# Patient Record
Sex: FEMALE | Employment: FULL TIME | URBAN - METROPOLITAN AREA
[De-identification: names, ages, dates, MRNs, and addresses within clinical notes are randomized per-mention and may not be internally consistent; named-entity substitution may affect disease eponyms.]

---

## 2020-05-12 ENCOUNTER — LAB ENCOUNTER (OUTPATIENT)
Dept: LAB | Age: 65
End: 2020-05-12
Attending: FAMILY MEDICINE
Payer: COMMERCIAL

## 2020-05-12 DIAGNOSIS — R53.83 FATIGUE: Primary | ICD-10-CM

## 2020-05-12 PROCEDURE — 81001 URINALYSIS AUTO W/SCOPE: CPT

## 2020-05-12 PROCEDURE — 84443 ASSAY THYROID STIM HORMONE: CPT

## 2020-05-12 PROCEDURE — 36415 COLL VENOUS BLD VENIPUNCTURE: CPT

## 2020-05-12 PROCEDURE — 80053 COMPREHEN METABOLIC PANEL: CPT

## 2020-05-12 PROCEDURE — 85025 COMPLETE CBC W/AUTO DIFF WBC: CPT

## 2020-05-12 PROCEDURE — 80061 LIPID PANEL: CPT

## 2021-08-16 ENCOUNTER — HOSPITAL ENCOUNTER (OUTPATIENT)
Dept: GENERAL RADIOLOGY | Facility: HOSPITAL | Age: 66
Discharge: HOME | End: 2021-08-16
Attending: FAMILY MEDICINE
Payer: COMMERCIAL

## 2021-08-16 ENCOUNTER — LAB ENCOUNTER (OUTPATIENT)
Dept: LAB | Facility: HOSPITAL | Age: 66
End: 2021-08-16
Attending: FAMILY MEDICINE
Payer: COMMERCIAL

## 2021-08-16 DIAGNOSIS — M25.562 LEFT KNEE PAIN: ICD-10-CM

## 2021-08-16 DIAGNOSIS — R53.83 FATIGUE: Primary | ICD-10-CM

## 2021-08-16 LAB
ALBUMIN SERPL-MCNC: 3.6 G/DL (ref 3.4–5)
ALBUMIN/GLOB SERPL: 0.7 {RATIO} (ref 1–2)
ALP LIVER SERPL-CCNC: 115 U/L
ALT SERPL-CCNC: 16 U/L
ANION GAP SERPL CALC-SCNC: 5 MMOL/L (ref 0–18)
AST SERPL-CCNC: 12 U/L (ref 15–37)
BASOPHILS # BLD AUTO: 0.04 X10(3) UL (ref 0–0.2)
BASOPHILS NFR BLD AUTO: 0.6 %
BILIRUB SERPL-MCNC: 0.3 MG/DL (ref 0.1–2)
BILIRUB UR QL: NEGATIVE
BUN BLD-MCNC: 13 MG/DL (ref 7–18)
BUN/CREAT SERPL: 19.7 (ref 10–20)
CALCIUM BLD-MCNC: 9.3 MG/DL (ref 8.5–10.1)
CHLORIDE SERPL-SCNC: 104 MMOL/L (ref 98–112)
CHOLEST SMN-MCNC: 191 MG/DL (ref ?–200)
CO2 SERPL-SCNC: 28 MMOL/L (ref 21–32)
COLOR UR: YELLOW
CREAT BLD-MCNC: 0.66 MG/DL
DEPRECATED RDW RBC AUTO: 41.6 FL (ref 35.1–46.3)
EOSINOPHIL # BLD AUTO: 0.22 X10(3) UL (ref 0–0.7)
EOSINOPHIL NFR BLD AUTO: 3.1 %
ERYTHROCYTE [DISTWIDTH] IN BLOOD BY AUTOMATED COUNT: 13.6 % (ref 11–15)
GLOBULIN PLAS-MCNC: 5.1 G/DL (ref 2.8–4.4)
GLUCOSE BLD-MCNC: 88 MG/DL (ref 70–99)
GLUCOSE UR-MCNC: NEGATIVE MG/DL
HCT VFR BLD AUTO: 37.5 %
HDLC SERPL-MCNC: 74 MG/DL (ref 40–59)
HGB BLD-MCNC: 11.6 G/DL
IMM GRANULOCYTES # BLD AUTO: 0.03 X10(3) UL (ref 0–1)
IMM GRANULOCYTES NFR BLD: 0.4 %
KETONES UR-MCNC: NEGATIVE MG/DL
LDLC SERPL CALC-MCNC: 103 MG/DL (ref ?–100)
LYMPHOCYTES # BLD AUTO: 2.02 X10(3) UL (ref 1–4)
LYMPHOCYTES NFR BLD AUTO: 28.1 %
M PROTEIN MFR SERPL ELPH: 8.7 G/DL (ref 6.4–8.2)
MCH RBC QN AUTO: 25.8 PG (ref 26–34)
MCHC RBC AUTO-ENTMCNC: 30.9 G/DL (ref 31–37)
MCV RBC AUTO: 83.3 FL
MONOCYTES # BLD AUTO: 0.52 X10(3) UL (ref 0.1–1)
MONOCYTES NFR BLD AUTO: 7.2 %
NEUTROPHILS # BLD AUTO: 4.36 X10 (3) UL (ref 1.5–7.7)
NEUTROPHILS # BLD AUTO: 4.36 X10(3) UL (ref 1.5–7.7)
NEUTROPHILS NFR BLD AUTO: 60.6 %
NITRITE UR QL STRIP.AUTO: NEGATIVE
NONHDLC SERPL-MCNC: 117 MG/DL (ref ?–130)
OSMOLALITY SERPL CALC.SUM OF ELEC: 284 MOSM/KG (ref 275–295)
PATIENT FASTING Y/N/NP: YES
PATIENT FASTING Y/N/NP: YES
PH UR: 5 [PH] (ref 5–8)
PLATELET # BLD AUTO: 273 10(3)UL (ref 150–450)
POTASSIUM SERPL-SCNC: 3.4 MMOL/L (ref 3.5–5.1)
PROT UR-MCNC: 30 MG/DL
RBC # BLD AUTO: 4.5 X10(6)UL
SODIUM SERPL-SCNC: 137 MMOL/L (ref 136–145)
SP GR UR STRIP: 1.02 (ref 1–1.03)
TRIGL SERPL-MCNC: 76 MG/DL (ref 30–149)
UROBILINOGEN UR STRIP-ACNC: <2
VLDLC SERPL CALC-MCNC: 13 MG/DL (ref 0–30)
WBC # BLD AUTO: 7.2 X10(3) UL (ref 4–11)

## 2021-08-16 PROCEDURE — 73562 X-RAY EXAM OF KNEE 3: CPT | Performed by: FAMILY MEDICINE

## 2021-08-16 PROCEDURE — 80061 LIPID PANEL: CPT

## 2021-08-16 PROCEDURE — 81001 URINALYSIS AUTO W/SCOPE: CPT

## 2021-08-16 PROCEDURE — 36415 COLL VENOUS BLD VENIPUNCTURE: CPT

## 2021-08-16 PROCEDURE — 80053 COMPREHEN METABOLIC PANEL: CPT

## 2021-08-16 PROCEDURE — 85025 COMPLETE CBC W/AUTO DIFF WBC: CPT

## 2023-07-14 ENCOUNTER — HOSPITAL ENCOUNTER (OUTPATIENT)
Dept: GENERAL RADIOLOGY | Facility: HOSPITAL | Age: 68
Discharge: HOME | End: 2023-07-14
Attending: FAMILY MEDICINE
Payer: COMMERCIAL

## 2023-07-14 DIAGNOSIS — R05.9 COUGH: ICD-10-CM

## 2023-07-14 PROCEDURE — 71046 X-RAY EXAM CHEST 2 VIEWS: CPT | Performed by: FAMILY MEDICINE

## 2024-01-01 ENCOUNTER — HOSPITAL ENCOUNTER (INPATIENT)
Facility: HOSPITAL | Age: 69
LOS: 1 days | End: 2024-01-01
Attending: INTERNAL MEDICINE | Admitting: INTERNAL MEDICINE
Payer: OTHER MISCELLANEOUS

## 2024-01-01 ENCOUNTER — APPOINTMENT (OUTPATIENT)
Dept: GENERAL RADIOLOGY | Facility: HOSPITAL | Age: 69
End: 2024-01-01
Attending: INTERNAL MEDICINE
Payer: MEDICARE

## 2024-01-01 ENCOUNTER — TELEPHONE (OUTPATIENT)
Dept: PULMONOLOGY | Facility: CLINIC | Age: 69
End: 2024-01-01

## 2024-01-01 ENCOUNTER — APPOINTMENT (OUTPATIENT)
Dept: PICC SERVICES | Facility: HOSPITAL | Age: 69
End: 2024-01-01
Attending: INTERNAL MEDICINE
Payer: MEDICARE

## 2024-01-01 ENCOUNTER — APPOINTMENT (OUTPATIENT)
Dept: CT IMAGING | Facility: HOSPITAL | Age: 69
End: 2024-01-01
Attending: EMERGENCY MEDICINE
Payer: MEDICARE

## 2024-01-01 ENCOUNTER — HOSPITAL ENCOUNTER (INPATIENT)
Facility: HOSPITAL | Age: 69
LOS: 12 days | Discharge: HOSPICE | End: 2024-01-01
Attending: EMERGENCY MEDICINE | Admitting: INTERNAL MEDICINE
Payer: MEDICARE

## 2024-01-01 ENCOUNTER — APPOINTMENT (OUTPATIENT)
Dept: CV DIAGNOSTICS | Facility: HOSPITAL | Age: 69
End: 2024-01-01
Attending: INTERNAL MEDICINE
Payer: MEDICARE

## 2024-01-01 ENCOUNTER — APPOINTMENT (OUTPATIENT)
Dept: ULTRASOUND IMAGING | Facility: HOSPITAL | Age: 69
End: 2024-01-01
Attending: INTERNAL MEDICINE
Payer: MEDICARE

## 2024-01-01 ENCOUNTER — HOSPITAL ENCOUNTER (INPATIENT)
Facility: HOSPITAL | Age: 69
LOS: 1 days | End: 2024-03-10
Attending: INTERNAL MEDICINE | Admitting: INTERNAL MEDICINE
Payer: OTHER MISCELLANEOUS

## 2024-01-01 ENCOUNTER — APPOINTMENT (OUTPATIENT)
Dept: GENERAL RADIOLOGY | Facility: HOSPITAL | Age: 69
End: 2024-01-01
Attending: EMERGENCY MEDICINE
Payer: MEDICARE

## 2024-01-01 VITALS
HEART RATE: 94 BPM | SYSTOLIC BLOOD PRESSURE: 89 MMHG | DIASTOLIC BLOOD PRESSURE: 40 MMHG | OXYGEN SATURATION: 76 % | RESPIRATION RATE: 21 BRPM

## 2024-01-01 VITALS
DIASTOLIC BLOOD PRESSURE: 47 MMHG | RESPIRATION RATE: 15 BRPM | HEART RATE: 89 BPM | SYSTOLIC BLOOD PRESSURE: 96 MMHG | WEIGHT: 280 LBS | BODY MASS INDEX: 44 KG/M2 | TEMPERATURE: 98 F | OXYGEN SATURATION: 74 %

## 2024-01-01 DIAGNOSIS — D86.9 SARCOIDOSIS: ICD-10-CM

## 2024-01-01 DIAGNOSIS — J96.21 ACUTE ON CHRONIC RESPIRATORY FAILURE WITH HYPOXIA (HCC): Primary | ICD-10-CM

## 2024-01-01 LAB
ADENOVIRUS PCR:: NOT DETECTED
ALBUMIN SERPL-MCNC: 3.6 G/DL (ref 3.2–4.8)
ALBUMIN SERPL-MCNC: 3.6 G/DL (ref 3.2–4.8)
ALBUMIN SERPL-MCNC: 3.7 G/DL (ref 3.2–4.8)
ALBUMIN SERPL-MCNC: 3.8 G/DL (ref 3.2–4.8)
ALBUMIN SERPL-MCNC: 3.9 G/DL (ref 3.2–4.8)
ALBUMIN SERPL-MCNC: 4 G/DL (ref 3.2–4.8)
ALBUMIN SERPL-MCNC: 4.1 G/DL (ref 3.2–4.8)
ALBUMIN SERPL-MCNC: 4.2 G/DL (ref 3.2–4.8)
ALBUMIN/GLOB SERPL: 0.9 {RATIO} (ref 1–2)
ALBUMIN/GLOB SERPL: 1 {RATIO} (ref 1–2)
ALBUMIN/GLOB SERPL: 1 {RATIO} (ref 1–2)
ALBUMIN/GLOB SERPL: 1.1 {RATIO} (ref 1–2)
ALP LIVER SERPL-CCNC: 104 U/L
ALP LIVER SERPL-CCNC: 106 U/L
ALP LIVER SERPL-CCNC: 109 U/L
ALP LIVER SERPL-CCNC: 132 U/L
ALP LIVER SERPL-CCNC: 139 U/L
ALP LIVER SERPL-CCNC: 154 U/L
ALT SERPL-CCNC: 120 U/L
ALT SERPL-CCNC: 14 U/L
ALT SERPL-CCNC: 16 U/L
ALT SERPL-CCNC: 17 U/L
ALT SERPL-CCNC: 172 U/L
ALT SERPL-CCNC: 74 U/L
ANA NUCLEOLAR TITR SER IF: 320 {TITER}
ANION GAP SERPL CALC-SCNC: 1 MMOL/L (ref 0–18)
ANION GAP SERPL CALC-SCNC: 10 MMOL/L (ref 0–18)
ANION GAP SERPL CALC-SCNC: 3 MMOL/L (ref 0–18)
ANION GAP SERPL CALC-SCNC: 4 MMOL/L (ref 0–18)
ANION GAP SERPL CALC-SCNC: 5 MMOL/L (ref 0–18)
ANION GAP SERPL CALC-SCNC: 6 MMOL/L (ref 0–18)
ANION GAP SERPL CALC-SCNC: 7 MMOL/L (ref 0–18)
ANION GAP SERPL CALC-SCNC: 9 MMOL/L (ref 0–18)
APTT PPP: 28.6 SECONDS (ref 23.3–35.6)
ASPERGILLUS FUMIGATUS IGG: 8.4 MG/L
AST SERPL-CCNC: 17 U/L (ref ?–34)
AST SERPL-CCNC: 19 U/L (ref ?–34)
AST SERPL-CCNC: 23 U/L (ref ?–34)
AST SERPL-CCNC: 37 U/L (ref ?–34)
AST SERPL-CCNC: 61 U/L (ref ?–34)
AST SERPL-CCNC: 69 U/L (ref ?–34)
ATRIAL RATE: 130 BPM
B PARAPERT DNA SPEC QL NAA+PROBE: NOT DETECTED
B PERT DNA SPEC QL NAA+PROBE: NOT DETECTED
B2 GLYCOPROT1 IGG SERPL IA-ACNC: 1.3 U/ML (ref ?–7)
B2 GLYCOPROT1 IGM SERPL IA-ACNC: 2.5 U/ML (ref ?–7)
BASOPHILS # BLD AUTO: 0.01 X10(3) UL (ref 0–0.2)
BASOPHILS # BLD AUTO: 0.02 X10(3) UL (ref 0–0.2)
BASOPHILS # BLD AUTO: 0.03 X10(3) UL (ref 0–0.2)
BASOPHILS # BLD AUTO: 0.03 X10(3) UL (ref 0–0.2)
BASOPHILS # BLD AUTO: 0.04 X10(3) UL (ref 0–0.2)
BASOPHILS NFR BLD AUTO: 0.1 %
BASOPHILS NFR BLD AUTO: 0.2 %
BASOPHILS NFR BLD AUTO: 0.3 %
BASOPHILS NFR BLD AUTO: 0.3 %
BILIRUB SERPL-MCNC: 0.2 MG/DL (ref 0.2–1.1)
BILIRUB SERPL-MCNC: 0.3 MG/DL (ref 0.2–1.1)
BILIRUB SERPL-MCNC: 0.4 MG/DL (ref 0.2–1.1)
BILIRUB SERPL-MCNC: 0.5 MG/DL (ref 0.2–1.1)
BLASTOMYCES AG INTERP: NEGATIVE
BLASTOMYCES AG INTERP: NEGATIVE
BNP SERPL-MCNC: 18 PG/ML
BUN BLD-MCNC: 16 MG/DL (ref 9–23)
BUN BLD-MCNC: 22 MG/DL (ref 9–23)
BUN BLD-MCNC: 30 MG/DL (ref 9–23)
BUN BLD-MCNC: 31 MG/DL (ref 9–23)
BUN BLD-MCNC: 33 MG/DL (ref 9–23)
BUN BLD-MCNC: 34 MG/DL (ref 9–23)
BUN BLD-MCNC: 35 MG/DL (ref 9–23)
BUN BLD-MCNC: 37 MG/DL (ref 9–23)
BUN BLD-MCNC: 37 MG/DL (ref 9–23)
BUN BLD-MCNC: 42 MG/DL (ref 9–23)
BUN BLD-MCNC: 43 MG/DL (ref 9–23)
BUN BLD-MCNC: 48 MG/DL (ref 9–23)
BUN BLD-MCNC: 70 MG/DL (ref 9–23)
BUN/CREAT SERPL: 18.8 (ref 10–20)
BUN/CREAT SERPL: 25.9 (ref 10–20)
BUN/CREAT SERPL: 33.3 (ref 10–20)
BUN/CREAT SERPL: 34.3 (ref 10–20)
BUN/CREAT SERPL: 34.4 (ref 10–20)
BUN/CREAT SERPL: 36.1 (ref 10–20)
BUN/CREAT SERPL: 36.2 (ref 10–20)
BUN/CREAT SERPL: 36.5 (ref 10–20)
BUN/CREAT SERPL: 38.5 (ref 10–20)
BUN/CREAT SERPL: 39.3 (ref 10–20)
BUN/CREAT SERPL: 39.8 (ref 10–20)
BUN/CREAT SERPL: 41.6 (ref 10–20)
BUN/CREAT SERPL: 44.8 (ref 10–20)
C PNEUM DNA SPEC QL NAA+PROBE: NOT DETECTED
C3 SERPL-MCNC: 179.6 MG/DL (ref 90–170)
C4 SERPL-MCNC: 40.3 MG/DL (ref 12–36)
CALCIUM BLD-MCNC: 8.7 MG/DL (ref 8.7–10.4)
CALCIUM BLD-MCNC: 8.8 MG/DL (ref 8.7–10.4)
CALCIUM BLD-MCNC: 8.9 MG/DL (ref 8.7–10.4)
CALCIUM BLD-MCNC: 8.9 MG/DL (ref 8.7–10.4)
CALCIUM BLD-MCNC: 9 MG/DL (ref 8.7–10.4)
CALCIUM BLD-MCNC: 9.1 MG/DL (ref 8.7–10.4)
CALCIUM BLD-MCNC: 9.2 MG/DL (ref 8.7–10.4)
CALCIUM BLD-MCNC: 9.2 MG/DL (ref 8.7–10.4)
CALCIUM BLD-MCNC: 9.3 MG/DL (ref 8.7–10.4)
CARDIOLIPIN IGG SERPL-ACNC: 3.3 GPL (ref ?–10)
CARDIOLIPIN IGM SERPL-ACNC: 8.9 MPL (ref ?–10)
CCP IGG SERPL-ACNC: 1.4 U/ML (ref 0–6.9)
CENTROMERE IGG SER-ACNC: 131 U/ML
CHLORIDE SERPL-SCNC: 102 MMOL/L (ref 98–112)
CHLORIDE SERPL-SCNC: 103 MMOL/L (ref 98–112)
CHLORIDE SERPL-SCNC: 103 MMOL/L (ref 98–112)
CHLORIDE SERPL-SCNC: 104 MMOL/L (ref 98–112)
CHLORIDE SERPL-SCNC: 105 MMOL/L (ref 98–112)
CHLORIDE SERPL-SCNC: 106 MMOL/L (ref 98–112)
CHLORIDE SERPL-SCNC: 109 MMOL/L (ref 98–112)
CHLORIDE SERPL-SCNC: 109 MMOL/L (ref 98–112)
CHLORIDE SERPL-SCNC: 110 MMOL/L (ref 98–112)
CO2 SERPL-SCNC: 22 MMOL/L (ref 21–32)
CO2 SERPL-SCNC: 23 MMOL/L (ref 21–32)
CO2 SERPL-SCNC: 26 MMOL/L (ref 21–32)
CO2 SERPL-SCNC: 27 MMOL/L (ref 21–32)
CO2 SERPL-SCNC: 28 MMOL/L (ref 21–32)
CO2 SERPL-SCNC: 28 MMOL/L (ref 21–32)
CO2 SERPL-SCNC: 29 MMOL/L (ref 21–32)
CO2 SERPL-SCNC: 31 MMOL/L (ref 21–32)
CO2 SERPL-SCNC: 32 MMOL/L (ref 21–32)
CONFIRM APTT STACLOT: POSITIVE
CONFIRM DRVVT: 1.3 S (ref 0–1.1)
CONFIRM DRVVT: 1.5 S (ref 0–1.1)
CORONAVIRUS 229E PCR:: NOT DETECTED
CORONAVIRUS HKU1 PCR:: NOT DETECTED
CORONAVIRUS NL63 PCR:: NOT DETECTED
CORONAVIRUS OC43 PCR:: NOT DETECTED
CREAT BLD-MCNC: 0.83 MG/DL
CREAT BLD-MCNC: 0.85 MG/DL
CREAT BLD-MCNC: 0.89 MG/DL
CREAT BLD-MCNC: 0.91 MG/DL
CREAT BLD-MCNC: 0.96 MG/DL
CREAT BLD-MCNC: 0.96 MG/DL
CREAT BLD-MCNC: 1.02 MG/DL
CREAT BLD-MCNC: 1.08 MG/DL
CREAT BLD-MCNC: 1.16 MG/DL
CREAT BLD-MCNC: 1.22 MG/DL
CREAT BLD-MCNC: 1.76 MG/DL
D DIMER PPP FEU-MCNC: 1.42 UG/ML FEU (ref ?–0.67)
DEPRECATED RDW RBC AUTO: 44.5 FL (ref 35.1–46.3)
DEPRECATED RDW RBC AUTO: 45 FL (ref 35.1–46.3)
DEPRECATED RDW RBC AUTO: 45.4 FL (ref 35.1–46.3)
DEPRECATED RDW RBC AUTO: 45.5 FL (ref 35.1–46.3)
DEPRECATED RDW RBC AUTO: 46.1 FL (ref 35.1–46.3)
DEPRECATED RDW RBC AUTO: 46.5 FL (ref 35.1–46.3)
DEPRECATED RDW RBC AUTO: 47 FL (ref 35.1–46.3)
DEPRECATED RDW RBC AUTO: 47.4 FL (ref 35.1–46.3)
DEPRECATED RDW RBC AUTO: 47.7 FL (ref 35.1–46.3)
DEPRECATED RDW RBC AUTO: 48.9 FL (ref 35.1–46.3)
DEPRECATED RDW RBC AUTO: 50.3 FL (ref 35.1–46.3)
DSDNA IGG SERPL IA-ACNC: 1.1 IU/ML
DSDNA IGG SERPL IA-ACNC: 1.1 IU/ML
EGFRCR SERPLBLD CKD-EPI 2021: 31 ML/MIN/1.73M2 (ref 60–?)
EGFRCR SERPLBLD CKD-EPI 2021: 49 ML/MIN/1.73M2 (ref 60–?)
EGFRCR SERPLBLD CKD-EPI 2021: 52 ML/MIN/1.73M2 (ref 60–?)
EGFRCR SERPLBLD CKD-EPI 2021: 56 ML/MIN/1.73M2 (ref 60–?)
EGFRCR SERPLBLD CKD-EPI 2021: 60 ML/MIN/1.73M2 (ref 60–?)
EGFRCR SERPLBLD CKD-EPI 2021: 65 ML/MIN/1.73M2 (ref 60–?)
EGFRCR SERPLBLD CKD-EPI 2021: 65 ML/MIN/1.73M2 (ref 60–?)
EGFRCR SERPLBLD CKD-EPI 2021: 69 ML/MIN/1.73M2 (ref 60–?)
EGFRCR SERPLBLD CKD-EPI 2021: 71 ML/MIN/1.73M2 (ref 60–?)
EGFRCR SERPLBLD CKD-EPI 2021: 75 ML/MIN/1.73M2 (ref 60–?)
EGFRCR SERPLBLD CKD-EPI 2021: 77 ML/MIN/1.73M2 (ref 60–?)
ENA AB SER QL IA: 22 UG/L
ENA AB SER QL IA: POSITIVE
ENA JO1 AB SER IA-ACNC: 0.4 U/ML
ENA RNP IGG SER IA-ACNC: 2.1 U/ML
ENA SCL70 IGG SER IA-ACNC: 1 U/ML
ENA SM IGG SER IA-ACNC: 1.1 U/ML
ENA SS-A IGG SER IA-ACNC: 105 U/ML
ENA SS-B IGG SER IA-ACNC: 0.7 U/ML
EOSINOPHIL # BLD AUTO: 0 X10(3) UL (ref 0–0.7)
EOSINOPHIL # BLD AUTO: 0.14 X10(3) UL (ref 0–0.7)
EOSINOPHIL NFR BLD AUTO: 0 %
EOSINOPHIL NFR BLD AUTO: 0.9 %
ERYTHROCYTE [DISTWIDTH] IN BLOOD BY AUTOMATED COUNT: 15.9 % (ref 11–15)
ERYTHROCYTE [DISTWIDTH] IN BLOOD BY AUTOMATED COUNT: 16.1 % (ref 11–15)
ERYTHROCYTE [DISTWIDTH] IN BLOOD BY AUTOMATED COUNT: 16.1 % (ref 11–15)
ERYTHROCYTE [DISTWIDTH] IN BLOOD BY AUTOMATED COUNT: 16.3 % (ref 11–15)
ERYTHROCYTE [DISTWIDTH] IN BLOOD BY AUTOMATED COUNT: 16.4 % (ref 11–15)
ERYTHROCYTE [DISTWIDTH] IN BLOOD BY AUTOMATED COUNT: 16.6 % (ref 11–15)
ERYTHROCYTE [DISTWIDTH] IN BLOOD BY AUTOMATED COUNT: 16.7 % (ref 11–15)
ERYTHROCYTE [SEDIMENTATION RATE] IN BLOOD: >130 MM/HR
FLUAV + FLUBV RNA SPEC NAA+PROBE: NEGATIVE
FLUAV + FLUBV RNA SPEC NAA+PROBE: NEGATIVE
FLUAV RNA SPEC QL NAA+PROBE: NOT DETECTED
FLUBV RNA SPEC QL NAA+PROBE: NOT DETECTED
GLOBULIN PLAS-MCNC: 3.5 G/DL (ref 2.8–4.4)
GLOBULIN PLAS-MCNC: 3.7 G/DL (ref 2.8–4.4)
GLOBULIN PLAS-MCNC: 3.8 G/DL (ref 2.8–4.4)
GLOBULIN PLAS-MCNC: 3.8 G/DL (ref 2.8–4.4)
GLOBULIN PLAS-MCNC: 4 G/DL (ref 2.8–4.4)
GLOBULIN PLAS-MCNC: 4 G/DL (ref 2.8–4.4)
GLUCOSE BLD-MCNC: 102 MG/DL (ref 70–99)
GLUCOSE BLD-MCNC: 104 MG/DL (ref 70–99)
GLUCOSE BLD-MCNC: 106 MG/DL (ref 70–99)
GLUCOSE BLD-MCNC: 106 MG/DL (ref 70–99)
GLUCOSE BLD-MCNC: 111 MG/DL (ref 70–99)
GLUCOSE BLD-MCNC: 112 MG/DL (ref 70–99)
GLUCOSE BLD-MCNC: 113 MG/DL (ref 70–99)
GLUCOSE BLD-MCNC: 114 MG/DL (ref 70–99)
GLUCOSE BLD-MCNC: 114 MG/DL (ref 70–99)
GLUCOSE BLD-MCNC: 115 MG/DL (ref 70–99)
GLUCOSE BLD-MCNC: 121 MG/DL (ref 70–99)
GLUCOSE BLD-MCNC: 132 MG/DL (ref 70–99)
GLUCOSE BLD-MCNC: 138 MG/DL (ref 70–99)
HAV IGM SER QL: NONREACTIVE
HBV CORE IGM SER QL: NONREACTIVE
HBV SURFACE AG SERPL QL IA: NONREACTIVE
HCT VFR BLD AUTO: 35.2 %
HCT VFR BLD AUTO: 36.7 %
HCT VFR BLD AUTO: 37.9 %
HCT VFR BLD AUTO: 38.2 %
HCT VFR BLD AUTO: 38.5 %
HCT VFR BLD AUTO: 39.2 %
HCT VFR BLD AUTO: 39.8 %
HCT VFR BLD AUTO: 40.1 %
HCT VFR BLD AUTO: 40.6 %
HCT VFR BLD AUTO: 41.1 %
HCT VFR BLD AUTO: 41.3 %
HCV AB SERPL QL IA: NONREACTIVE
HGB BLD-MCNC: 11.2 G/DL
HGB BLD-MCNC: 11.3 G/DL
HGB BLD-MCNC: 11.4 G/DL
HGB BLD-MCNC: 11.5 G/DL
HGB BLD-MCNC: 11.5 G/DL
HGB BLD-MCNC: 11.9 G/DL
HGB BLD-MCNC: 12.1 G/DL
HGB BLD-MCNC: 12.1 G/DL
HGB BLD-MCNC: 12.2 G/DL
HGB BLD-MCNC: 12.3 G/DL
HGB BLD-MCNC: 12.3 G/DL
HGB BLD-MCNC: 12.4 G/DL
HGB BLD-MCNC: 12.6 G/DL
IMM GRANULOCYTES # BLD AUTO: 0.04 X10(3) UL (ref 0–1)
IMM GRANULOCYTES # BLD AUTO: 0.05 X10(3) UL (ref 0–1)
IMM GRANULOCYTES # BLD AUTO: 0.05 X10(3) UL (ref 0–1)
IMM GRANULOCYTES # BLD AUTO: 0.06 X10(3) UL (ref 0–1)
IMM GRANULOCYTES # BLD AUTO: 0.07 X10(3) UL (ref 0–1)
IMM GRANULOCYTES # BLD AUTO: 0.07 X10(3) UL (ref 0–1)
IMM GRANULOCYTES # BLD AUTO: 0.09 X10(3) UL (ref 0–1)
IMM GRANULOCYTES # BLD AUTO: 0.11 X10(3) UL (ref 0–1)
IMM GRANULOCYTES # BLD AUTO: 0.12 X10(3) UL (ref 0–1)
IMM GRANULOCYTES # BLD AUTO: 0.12 X10(3) UL (ref 0–1)
IMM GRANULOCYTES # BLD AUTO: 0.16 X10(3) UL (ref 0–1)
IMM GRANULOCYTES # BLD AUTO: 0.2 X10(3) UL (ref 0–1)
IMM GRANULOCYTES # BLD AUTO: 0.26 X10(3) UL (ref 0–1)
IMM GRANULOCYTES NFR BLD: 0.4 %
IMM GRANULOCYTES NFR BLD: 0.5 %
IMM GRANULOCYTES NFR BLD: 0.6 %
IMM GRANULOCYTES NFR BLD: 0.7 %
IMM GRANULOCYTES NFR BLD: 0.7 %
IMM GRANULOCYTES NFR BLD: 0.8 %
IMM GRANULOCYTES NFR BLD: 1.2 %
IMM GRANULOCYTES NFR BLD: 1.5 %
IMM GRANULOCYTES NFR BLD: 1.7 %
LDH SERPL L TO P-CCNC: 438 U/L
LYMPHOCYTES # BLD AUTO: 0.21 X10(3) UL (ref 1–4)
LYMPHOCYTES # BLD AUTO: 0.23 X10(3) UL (ref 1–4)
LYMPHOCYTES # BLD AUTO: 0.24 X10(3) UL (ref 1–4)
LYMPHOCYTES # BLD AUTO: 0.38 X10(3) UL (ref 1–4)
LYMPHOCYTES # BLD AUTO: 0.38 X10(3) UL (ref 1–4)
LYMPHOCYTES # BLD AUTO: 0.42 X10(3) UL (ref 1–4)
LYMPHOCYTES # BLD AUTO: 0.43 X10(3) UL (ref 1–4)
LYMPHOCYTES # BLD AUTO: 0.47 X10(3) UL (ref 1–4)
LYMPHOCYTES # BLD AUTO: 0.59 X10(3) UL (ref 1–4)
LYMPHOCYTES # BLD AUTO: 0.82 X10(3) UL (ref 1–4)
LYMPHOCYTES # BLD AUTO: 1.53 X10(3) UL (ref 1–4)
LYMPHOCYTES # BLD AUTO: 1.6 X10(3) UL (ref 1–4)
LYMPHOCYTES # BLD AUTO: 3.1 X10(3) UL (ref 1–4)
LYMPHOCYTES NFR BLD AUTO: 1.5 %
LYMPHOCYTES NFR BLD AUTO: 1.6 %
LYMPHOCYTES NFR BLD AUTO: 1.7 %
LYMPHOCYTES NFR BLD AUTO: 10.5 %
LYMPHOCYTES NFR BLD AUTO: 20 %
LYMPHOCYTES NFR BLD AUTO: 3.2 %
LYMPHOCYTES NFR BLD AUTO: 3.4 %
LYMPHOCYTES NFR BLD AUTO: 4.2 %
LYMPHOCYTES NFR BLD AUTO: 4.2 %
LYMPHOCYTES NFR BLD AUTO: 4.4 %
LYMPHOCYTES NFR BLD AUTO: 4.9 %
LYMPHOCYTES NFR BLD AUTO: 5.2 %
LYMPHOCYTES NFR BLD AUTO: 8.1 %
M TB IFN-G CD4+ T-CELLS BLD-ACNC: 0 IU/ML
M TB TUBERC IFN-G BLD QL: NEGATIVE
M TB TUBERC IGNF/MITOGEN IGNF CONTROL: 0.68 IU/ML
MAGNESIUM SERPL-MCNC: 2.2 MG/DL (ref 1.6–2.6)
MAGNESIUM SERPL-MCNC: 2.4 MG/DL (ref 1.6–2.6)
MAGNESIUM SERPL-MCNC: 2.6 MG/DL (ref 1.6–2.6)
MCH RBC QN AUTO: 23.8 PG (ref 26–34)
MCH RBC QN AUTO: 23.9 PG (ref 26–34)
MCH RBC QN AUTO: 24 PG (ref 26–34)
MCH RBC QN AUTO: 24.1 PG (ref 26–34)
MCH RBC QN AUTO: 24.2 PG (ref 26–34)
MCH RBC QN AUTO: 24.2 PG (ref 26–34)
MCH RBC QN AUTO: 24.5 PG (ref 26–34)
MCH RBC QN AUTO: 24.6 PG (ref 26–34)
MCH RBC QN AUTO: 25.1 PG (ref 26–34)
MCH RBC QN AUTO: 25.2 PG (ref 26–34)
MCH RBC QN AUTO: 25.2 PG (ref 26–34)
MCH RBC QN AUTO: 25.4 PG (ref 26–34)
MCH RBC QN AUTO: 25.6 PG (ref 26–34)
MCHC RBC AUTO-ENTMCNC: 29.3 G/DL (ref 31–37)
MCHC RBC AUTO-ENTMCNC: 29.9 G/DL (ref 31–37)
MCHC RBC AUTO-ENTMCNC: 30.1 G/DL (ref 31–37)
MCHC RBC AUTO-ENTMCNC: 30.1 G/DL (ref 31–37)
MCHC RBC AUTO-ENTMCNC: 30.5 G/DL (ref 31–37)
MCHC RBC AUTO-ENTMCNC: 30.9 G/DL (ref 31–37)
MCHC RBC AUTO-ENTMCNC: 30.9 G/DL (ref 31–37)
MCHC RBC AUTO-ENTMCNC: 31 G/DL (ref 31–37)
MCHC RBC AUTO-ENTMCNC: 31.9 G/DL (ref 31–37)
MCHC RBC AUTO-ENTMCNC: 32.1 G/DL (ref 31–37)
MCHC RBC AUTO-ENTMCNC: 32.2 G/DL (ref 31–37)
MCV RBC AUTO: 78.4 FL
MCV RBC AUTO: 78.4 FL
MCV RBC AUTO: 78.8 FL
MCV RBC AUTO: 79 FL
MCV RBC AUTO: 79.6 FL
MCV RBC AUTO: 79.8 FL
MCV RBC AUTO: 80.3 FL
MCV RBC AUTO: 80.4 FL
MCV RBC AUTO: 80.5 FL
MCV RBC AUTO: 80.9 FL
MCV RBC AUTO: 80.9 FL
MCV RBC AUTO: 82 FL
MCV RBC AUTO: 83.6 FL
METAPNEUMOVIRUS PCR:: NOT DETECTED
MONOCYTES # BLD AUTO: 0.23 X10(3) UL (ref 0.1–1)
MONOCYTES # BLD AUTO: 0.24 X10(3) UL (ref 0.1–1)
MONOCYTES # BLD AUTO: 0.3 X10(3) UL (ref 0.1–1)
MONOCYTES # BLD AUTO: 0.31 X10(3) UL (ref 0.1–1)
MONOCYTES # BLD AUTO: 0.31 X10(3) UL (ref 0.1–1)
MONOCYTES # BLD AUTO: 0.33 X10(3) UL (ref 0.1–1)
MONOCYTES # BLD AUTO: 0.37 X10(3) UL (ref 0.1–1)
MONOCYTES # BLD AUTO: 0.52 X10(3) UL (ref 0.1–1)
MONOCYTES # BLD AUTO: 0.52 X10(3) UL (ref 0.1–1)
MONOCYTES # BLD AUTO: 0.7 X10(3) UL (ref 0.1–1)
MONOCYTES # BLD AUTO: 0.79 X10(3) UL (ref 0.1–1)
MONOCYTES # BLD AUTO: 0.81 X10(3) UL (ref 0.1–1)
MONOCYTES # BLD AUTO: 0.91 X10(3) UL (ref 0.1–1)
MONOCYTES NFR BLD AUTO: 1.5 %
MONOCYTES NFR BLD AUTO: 2.2 %
MONOCYTES NFR BLD AUTO: 2.6 %
MONOCYTES NFR BLD AUTO: 2.7 %
MONOCYTES NFR BLD AUTO: 2.8 %
MONOCYTES NFR BLD AUTO: 3.2 %
MONOCYTES NFR BLD AUTO: 3.3 %
MONOCYTES NFR BLD AUTO: 3.7 %
MONOCYTES NFR BLD AUTO: 3.7 %
MONOCYTES NFR BLD AUTO: 3.8 %
MONOCYTES NFR BLD AUTO: 5.2 %
MONOCYTES NFR BLD AUTO: 5.8 %
MONOCYTES NFR BLD AUTO: 6 %
MYCOPLASMA PNEUMONIA PCR:: NOT DETECTED
NEUTROPHILS # BLD AUTO: 10.29 X10 (3) UL (ref 1.5–7.7)
NEUTROPHILS # BLD AUTO: 10.29 X10(3) UL (ref 1.5–7.7)
NEUTROPHILS # BLD AUTO: 10.44 X10 (3) UL (ref 1.5–7.7)
NEUTROPHILS # BLD AUTO: 10.44 X10(3) UL (ref 1.5–7.7)
NEUTROPHILS # BLD AUTO: 11.13 X10 (3) UL (ref 1.5–7.7)
NEUTROPHILS # BLD AUTO: 11.13 X10(3) UL (ref 1.5–7.7)
NEUTROPHILS # BLD AUTO: 11.2 X10 (3) UL (ref 1.5–7.7)
NEUTROPHILS # BLD AUTO: 11.2 X10(3) UL (ref 1.5–7.7)
NEUTROPHILS # BLD AUTO: 11.31 X10 (3) UL (ref 1.5–7.7)
NEUTROPHILS # BLD AUTO: 11.31 X10(3) UL (ref 1.5–7.7)
NEUTROPHILS # BLD AUTO: 12.34 X10 (3) UL (ref 1.5–7.7)
NEUTROPHILS # BLD AUTO: 12.34 X10(3) UL (ref 1.5–7.7)
NEUTROPHILS # BLD AUTO: 12.86 X10 (3) UL (ref 1.5–7.7)
NEUTROPHILS # BLD AUTO: 12.86 X10(3) UL (ref 1.5–7.7)
NEUTROPHILS # BLD AUTO: 13.25 X10 (3) UL (ref 1.5–7.7)
NEUTROPHILS # BLD AUTO: 13.25 X10(3) UL (ref 1.5–7.7)
NEUTROPHILS # BLD AUTO: 13.61 X10 (3) UL (ref 1.5–7.7)
NEUTROPHILS # BLD AUTO: 13.61 X10(3) UL (ref 1.5–7.7)
NEUTROPHILS # BLD AUTO: 14.46 X10 (3) UL (ref 1.5–7.7)
NEUTROPHILS # BLD AUTO: 14.46 X10(3) UL (ref 1.5–7.7)
NEUTROPHILS # BLD AUTO: 16.41 X10 (3) UL (ref 1.5–7.7)
NEUTROPHILS # BLD AUTO: 16.41 X10(3) UL (ref 1.5–7.7)
NEUTROPHILS # BLD AUTO: 8.95 X10 (3) UL (ref 1.5–7.7)
NEUTROPHILS # BLD AUTO: 8.95 X10(3) UL (ref 1.5–7.7)
NEUTROPHILS # BLD AUTO: 9.01 X10 (3) UL (ref 1.5–7.7)
NEUTROPHILS # BLD AUTO: 9.01 X10(3) UL (ref 1.5–7.7)
NEUTROPHILS NFR BLD AUTO: 72.8 %
NEUTROPHILS NFR BLD AUTO: 87.2 %
NEUTROPHILS NFR BLD AUTO: 87.5 %
NEUTROPHILS NFR BLD AUTO: 90.5 %
NEUTROPHILS NFR BLD AUTO: 90.8 %
NEUTROPHILS NFR BLD AUTO: 90.9 %
NEUTROPHILS NFR BLD AUTO: 91.1 %
NEUTROPHILS NFR BLD AUTO: 91.9 %
NEUTROPHILS NFR BLD AUTO: 92 %
NEUTROPHILS NFR BLD AUTO: 93 %
NEUTROPHILS NFR BLD AUTO: 93.2 %
NEUTROPHILS NFR BLD AUTO: 93.4 %
NEUTROPHILS NFR BLD AUTO: 93.5 %
NUCLEAR IGG TITR SER IF: POSITIVE {TITER}
OSMOLALITY SERPL CALC.SUM OF ELEC: 288 MOSM/KG (ref 275–295)
OSMOLALITY SERPL CALC.SUM OF ELEC: 293 MOSM/KG (ref 275–295)
OSMOLALITY SERPL CALC.SUM OF ELEC: 294 MOSM/KG (ref 275–295)
OSMOLALITY SERPL CALC.SUM OF ELEC: 295 MOSM/KG (ref 275–295)
OSMOLALITY SERPL CALC.SUM OF ELEC: 296 MOSM/KG (ref 275–295)
OSMOLALITY SERPL CALC.SUM OF ELEC: 297 MOSM/KG (ref 275–295)
OSMOLALITY SERPL CALC.SUM OF ELEC: 299 MOSM/KG (ref 275–295)
OSMOLALITY SERPL CALC.SUM OF ELEC: 300 MOSM/KG (ref 275–295)
OSMOLALITY SERPL CALC.SUM OF ELEC: 301 MOSM/KG (ref 275–295)
OSMOLALITY SERPL CALC.SUM OF ELEC: 303 MOSM/KG (ref 275–295)
OSMOLALITY SERPL CALC.SUM OF ELEC: 309 MOSM/KG (ref 275–295)
P AXIS: 50 DEGREES
P-R INTERVAL: 134 MS
PARAINFLUENZA 1 PCR:: NOT DETECTED
PARAINFLUENZA 2 PCR:: NOT DETECTED
PARAINFLUENZA 3 PCR:: NOT DETECTED
PARAINFLUENZA 4 PCR:: NOT DETECTED
PHOSPHATE SERPL-MCNC: 4 MG/DL (ref 2.4–5.1)
PHOSPHATE SERPL-MCNC: 4 MG/DL (ref 2.4–5.1)
PHOSPHATE SERPL-MCNC: 4.2 MG/DL (ref 2.4–5.1)
PHOSPHATE SERPL-MCNC: 4.3 MG/DL (ref 2.4–5.1)
PHOSPHATE SERPL-MCNC: 4.5 MG/DL (ref 2.4–5.1)
PHOSPHATE SERPL-MCNC: 4.7 MG/DL (ref 2.4–5.1)
PHOSPHATE SERPL-MCNC: 4.7 MG/DL (ref 2.4–5.1)
PLATELET # BLD AUTO: 235 10(3)UL (ref 150–450)
PLATELET # BLD AUTO: 235 10(3)UL (ref 150–450)
PLATELET # BLD AUTO: 252 10(3)UL (ref 150–450)
PLATELET # BLD AUTO: 254 10(3)UL (ref 150–450)
PLATELET # BLD AUTO: 255 10(3)UL (ref 150–450)
PLATELET # BLD AUTO: 262 10(3)UL (ref 150–450)
PLATELET # BLD AUTO: 269 10(3)UL (ref 150–450)
PLATELET # BLD AUTO: 269 10(3)UL (ref 150–450)
PLATELET # BLD AUTO: 271 10(3)UL (ref 150–450)
PLATELET # BLD AUTO: 275 10(3)UL (ref 150–450)
PLATELET # BLD AUTO: 295 10(3)UL (ref 150–450)
PLATELET # BLD AUTO: 298 10(3)UL (ref 150–450)
PLATELET # BLD AUTO: 302 10(3)UL (ref 150–450)
POTASSIUM SERPL-SCNC: 3.5 MMOL/L (ref 3.5–5.1)
POTASSIUM SERPL-SCNC: 4 MMOL/L (ref 3.5–5.1)
POTASSIUM SERPL-SCNC: 4.1 MMOL/L (ref 3.5–5.1)
POTASSIUM SERPL-SCNC: 4.4 MMOL/L (ref 3.5–5.1)
POTASSIUM SERPL-SCNC: 4.6 MMOL/L (ref 3.5–5.1)
POTASSIUM SERPL-SCNC: 4.6 MMOL/L (ref 3.5–5.1)
POTASSIUM SERPL-SCNC: 4.8 MMOL/L (ref 3.5–5.1)
POTASSIUM SERPL-SCNC: 4.8 MMOL/L (ref 3.5–5.1)
POTASSIUM SERPL-SCNC: 4.9 MMOL/L (ref 3.5–5.1)
POTASSIUM SERPL-SCNC: 5 MMOL/L (ref 3.5–5.1)
POTASSIUM SERPL-SCNC: 5.3 MMOL/L (ref 3.5–5.1)
POTASSIUM SERPL-SCNC: 5.4 MMOL/L (ref 3.5–5.1)
POTASSIUM SERPL-SCNC: 6.2 MMOL/L (ref 3.5–5.1)
PROCALCITONIN SERPL-MCNC: <0.04 NG/ML (ref ?–0.05)
PROT SERPL-MCNC: 7.2 G/DL (ref 5.7–8.2)
PROT SERPL-MCNC: 7.4 G/DL (ref 5.7–8.2)
PROT SERPL-MCNC: 7.4 G/DL (ref 5.7–8.2)
PROT SERPL-MCNC: 7.8 G/DL (ref 5.7–8.2)
PROT SERPL-MCNC: 8.1 G/DL (ref 5.7–8.2)
PROT SERPL-MCNC: 8.2 G/DL (ref 5.7–8.2)
PROTHROMBIN TIME: 14.2 SECONDS (ref 11.6–14.8)
Q-T INTERVAL: 278 MS
QFT TB1 AG MINUS NIL: 0 IU/ML
QFT TB2 AG MINUS NIL: 0 IU/ML
QRS DURATION: 70 MS
QTC CALCULATION (BEZET): 409 MS
R AXIS: 7 DEGREES
RBC # BLD AUTO: 4.49 X10(6)UL
RBC # BLD AUTO: 4.68 X10(6)UL
RBC # BLD AUTO: 4.75 X10(6)UL
RBC # BLD AUTO: 4.78 X10(6)UL
RBC # BLD AUTO: 4.8 X10(6)UL
RBC # BLD AUTO: 4.8 X10(6)UL
RBC # BLD AUTO: 4.85 X10(6)UL
RBC # BLD AUTO: 4.89 X10(6)UL
RBC # BLD AUTO: 4.91 X10(6)UL
RBC # BLD AUTO: 4.92 X10(6)UL
RBC # BLD AUTO: 4.94 X10(6)UL
RBC # BLD AUTO: 5.02 X10(6)UL
RBC # BLD AUTO: 5.12 X10(6)UL
RHEUMATOID FACT SERPL-ACNC: 11 IU/ML (ref ?–14)
RHINOVIRUS/ENTERO PCR:: NOT DETECTED
RSV RNA SPEC NAA+PROBE: NEGATIVE
RSV RNA SPEC QL NAA+PROBE: NOT DETECTED
SARS-COV-2 RNA NPH QL NAA+NON-PROBE: NOT DETECTED
SARS-COV-2 RNA RESP QL NAA+PROBE: NOT DETECTED
SCREEN DRVVT: 1.2 S (ref 0–1.1)
SODIUM SERPL-SCNC: 134 MMOL/L (ref 136–145)
SODIUM SERPL-SCNC: 136 MMOL/L (ref 136–145)
SODIUM SERPL-SCNC: 137 MMOL/L (ref 136–145)
SODIUM SERPL-SCNC: 139 MMOL/L (ref 136–145)
SODIUM SERPL-SCNC: 140 MMOL/L (ref 136–145)
SODIUM SERPL-SCNC: 141 MMOL/L (ref 136–145)
SODIUM SERPL-SCNC: 141 MMOL/L (ref 136–145)
SODIUM SERPL-SCNC: 142 MMOL/L (ref 136–145)
T AXIS: 83 DEGREES
TROPONIN I SERPL HS-MCNC: 13 NG/L
U1 SNRNP IGG SER IA-ACNC: 2.5 U/ML
VENTRICULAR RATE: 130 BPM
WBC # BLD AUTO: 11.1 X10(3) UL (ref 4–11)
WBC # BLD AUTO: 11.2 X10(3) UL (ref 4–11)
WBC # BLD AUTO: 12 X10(3) UL (ref 4–11)
WBC # BLD AUTO: 12.1 X10(3) UL (ref 4–11)
WBC # BLD AUTO: 13.6 X10(3) UL (ref 4–11)
WBC # BLD AUTO: 13.8 X10(3) UL (ref 4–11)
WBC # BLD AUTO: 15.1 X10(3) UL (ref 4–11)
WBC # BLD AUTO: 15.2 X10(3) UL (ref 4–11)
WBC # BLD AUTO: 15.5 X10(3) UL (ref 4–11)
WBC # BLD AUTO: 15.9 X10(3) UL (ref 4–11)
WBC # BLD AUTO: 18.8 X10(3) UL (ref 4–11)
WBC # BLD AUTO: 9.7 X10(3) UL (ref 4–11)
WBC # BLD AUTO: 9.9 X10(3) UL (ref 4–11)

## 2024-01-01 PROCEDURE — 99233 SBSQ HOSP IP/OBS HIGH 50: CPT | Performed by: INTERNAL MEDICINE

## 2024-01-01 PROCEDURE — 71260 CT THORAX DX C+: CPT | Performed by: EMERGENCY MEDICINE

## 2024-01-01 PROCEDURE — 99233 SBSQ HOSP IP/OBS HIGH 50: CPT | Performed by: HOSPITALIST

## 2024-01-01 PROCEDURE — 99291 CRITICAL CARE FIRST HOUR: CPT | Performed by: NURSE PRACTITIONER

## 2024-01-01 PROCEDURE — 99232 SBSQ HOSP IP/OBS MODERATE 35: CPT | Performed by: INTERNAL MEDICINE

## 2024-01-01 PROCEDURE — 71045 X-RAY EXAM CHEST 1 VIEW: CPT | Performed by: INTERNAL MEDICINE

## 2024-01-01 PROCEDURE — 99239 HOSP IP/OBS DSCHRG MGMT >30: CPT | Performed by: INTERNAL MEDICINE

## 2024-01-01 PROCEDURE — 99497 ADVNCD CARE PLAN 30 MIN: CPT | Performed by: INTERNAL MEDICINE

## 2024-01-01 PROCEDURE — 99223 1ST HOSP IP/OBS HIGH 75: CPT | Performed by: INTERNAL MEDICINE

## 2024-01-01 PROCEDURE — 76604 US EXAM CHEST: CPT | Performed by: INTERNAL MEDICINE

## 2024-01-01 PROCEDURE — 99291 CRITICAL CARE FIRST HOUR: CPT | Performed by: INTERNAL MEDICINE

## 2024-01-01 PROCEDURE — 93306 TTE W/DOPPLER COMPLETE: CPT | Performed by: INTERNAL MEDICINE

## 2024-01-01 PROCEDURE — 5A0955A ASSISTANCE WITH RESPIRATORY VENTILATION, GREATER THAN 96 CONSECUTIVE HOURS, HIGH NASAL FLOW/VELOCITY: ICD-10-PCS | Performed by: INTERNAL MEDICINE

## 2024-01-01 PROCEDURE — 99255 IP/OBS CONSLTJ NEW/EST HI 80: CPT | Performed by: INTERNAL MEDICINE

## 2024-01-01 PROCEDURE — 71045 X-RAY EXAM CHEST 1 VIEW: CPT | Performed by: EMERGENCY MEDICINE

## 2024-01-01 RX ORDER — GLYCOPYRROLATE 0.2 MG/ML
0.4 INJECTION, SOLUTION INTRAMUSCULAR; INTRAVENOUS
Status: DISCONTINUED | OUTPATIENT
Start: 2024-01-01 | End: 2024-03-10

## 2024-01-01 RX ORDER — PANTOPRAZOLE SODIUM 40 MG/1
40 TABLET, DELAYED RELEASE ORAL
Status: DISCONTINUED | OUTPATIENT
Start: 2024-01-01 | End: 2024-01-01

## 2024-01-01 RX ORDER — METOCLOPRAMIDE HYDROCHLORIDE 5 MG/ML
5 INJECTION INTRAMUSCULAR; INTRAVENOUS EVERY 8 HOURS PRN
Status: DISCONTINUED | OUTPATIENT
Start: 2024-01-01 | End: 2024-01-01

## 2024-01-01 RX ORDER — MORPHINE SULFATE 2 MG/ML
1 INJECTION, SOLUTION INTRAMUSCULAR; INTRAVENOUS EVERY 6 HOURS PRN
Status: DISCONTINUED | OUTPATIENT
Start: 2024-01-01 | End: 2024-01-01

## 2024-01-01 RX ORDER — FLUTICASONE PROPIONATE 50 MCG
1 SPRAY, SUSPENSION (ML) NASAL DAILY
Status: DISCONTINUED | OUTPATIENT
Start: 2024-01-01 | End: 2024-01-01

## 2024-01-01 RX ORDER — MYCOPHENOLIC ACID 180 MG/1
360 TABLET, DELAYED RELEASE ORAL
Status: DISCONTINUED | OUTPATIENT
Start: 2024-01-01 | End: 2024-01-01

## 2024-01-01 RX ORDER — BISACODYL 10 MG
10 SUPPOSITORY, RECTAL RECTAL
Status: DISCONTINUED | OUTPATIENT
Start: 2024-01-01 | End: 2024-01-01

## 2024-01-01 RX ORDER — MORPHINE SULFATE 2 MG/ML
1 INJECTION, SOLUTION INTRAMUSCULAR; INTRAVENOUS EVERY 4 HOURS PRN
Status: DISCONTINUED | OUTPATIENT
Start: 2024-01-01 | End: 2024-01-01

## 2024-01-01 RX ORDER — AMLODIPINE BESYLATE 5 MG/1
5 TABLET ORAL DAILY
Status: DISCONTINUED | OUTPATIENT
Start: 2024-01-01 | End: 2024-01-01

## 2024-01-01 RX ORDER — SULFAMETHOXAZOLE AND TRIMETHOPRIM 800; 160 MG/1; MG/1
1 TABLET ORAL
Status: DISCONTINUED | OUTPATIENT
Start: 2024-01-01 | End: 2024-01-01

## 2024-01-01 RX ORDER — BISACODYL 10 MG
10 SUPPOSITORY, RECTAL RECTAL
Status: DISCONTINUED | OUTPATIENT
Start: 2024-01-01 | End: 2024-03-10

## 2024-01-01 RX ORDER — ONDANSETRON 2 MG/ML
4 INJECTION INTRAMUSCULAR; INTRAVENOUS EVERY 6 HOURS PRN
Status: DISCONTINUED | OUTPATIENT
Start: 2024-01-01 | End: 2024-03-10

## 2024-01-01 RX ORDER — ALBUTEROL SULFATE 2.5 MG/3ML
10 SOLUTION RESPIRATORY (INHALATION) CONTINUOUS
Status: DISCONTINUED | OUTPATIENT
Start: 2024-01-01 | End: 2024-01-01

## 2024-01-01 RX ORDER — FUROSEMIDE 10 MG/ML
20 INJECTION INTRAMUSCULAR; INTRAVENOUS ONCE
Status: COMPLETED | OUTPATIENT
Start: 2024-01-01 | End: 2024-01-01

## 2024-01-01 RX ORDER — FUROSEMIDE 10 MG/ML
40 INJECTION INTRAMUSCULAR; INTRAVENOUS
Status: DISCONTINUED | OUTPATIENT
Start: 2024-01-01 | End: 2024-01-01

## 2024-01-01 RX ORDER — LABETALOL HYDROCHLORIDE 5 MG/ML
10 INJECTION, SOLUTION INTRAVENOUS EVERY 4 HOURS PRN
Status: DISCONTINUED | OUTPATIENT
Start: 2024-01-01 | End: 2024-01-01

## 2024-01-01 RX ORDER — METHYLPREDNISOLONE SODIUM SUCCINATE 40 MG/ML
40 INJECTION, POWDER, LYOPHILIZED, FOR SOLUTION INTRAMUSCULAR; INTRAVENOUS EVERY 12 HOURS
Status: DISCONTINUED | OUTPATIENT
Start: 2024-01-01 | End: 2024-01-01

## 2024-01-01 RX ORDER — MELATONIN
3 NIGHTLY PRN
Status: DISCONTINUED | OUTPATIENT
Start: 2024-01-01 | End: 2024-01-01

## 2024-01-01 RX ORDER — HEPARIN SODIUM 5000 [USP'U]/ML
5000 INJECTION, SOLUTION INTRAVENOUS; SUBCUTANEOUS EVERY 8 HOURS SCHEDULED
Status: DISCONTINUED | OUTPATIENT
Start: 2024-01-01 | End: 2024-01-01

## 2024-01-01 RX ORDER — MORPHINE SULFATE 2 MG/ML
1 INJECTION, SOLUTION INTRAMUSCULAR; INTRAVENOUS ONCE
Status: DISCONTINUED | OUTPATIENT
Start: 2024-01-01 | End: 2024-01-01

## 2024-01-01 RX ORDER — FUROSEMIDE 10 MG/ML
20 INJECTION INTRAMUSCULAR; INTRAVENOUS ONCE
Qty: 2 ML | Refills: 0 | Status: COMPLETED | OUTPATIENT
Start: 2024-01-01 | End: 2024-01-01

## 2024-01-01 RX ORDER — MYCOPHENOLATE MOFETIL 250 MG/1
500 CAPSULE ORAL
Status: DISCONTINUED | OUTPATIENT
Start: 2024-01-01 | End: 2024-01-01

## 2024-01-01 RX ORDER — BENZONATATE 100 MG/1
200 CAPSULE ORAL 3 TIMES DAILY PRN
Status: DISCONTINUED | OUTPATIENT
Start: 2024-01-01 | End: 2024-01-01

## 2024-01-01 RX ORDER — FUROSEMIDE 10 MG/ML
40 INJECTION INTRAMUSCULAR; INTRAVENOUS EVERY 8 HOURS PRN
Status: DISCONTINUED | OUTPATIENT
Start: 2024-01-01 | End: 2024-03-10

## 2024-01-01 RX ORDER — IPRATROPIUM BROMIDE AND ALBUTEROL SULFATE 2.5; .5 MG/3ML; MG/3ML
3 SOLUTION RESPIRATORY (INHALATION)
Status: DISCONTINUED | OUTPATIENT
Start: 2024-01-01 | End: 2024-01-01

## 2024-01-01 RX ORDER — ONDANSETRON 2 MG/ML
4 INJECTION INTRAMUSCULAR; INTRAVENOUS EVERY 6 HOURS PRN
Status: DISCONTINUED | OUTPATIENT
Start: 2024-01-01 | End: 2024-01-01

## 2024-01-01 RX ORDER — FUROSEMIDE 10 MG/ML
20 INJECTION INTRAMUSCULAR; INTRAVENOUS
Status: DISCONTINUED | OUTPATIENT
Start: 2024-01-01 | End: 2024-01-01

## 2024-01-01 RX ORDER — SENNOSIDES 8.6 MG
17.2 TABLET ORAL NIGHTLY PRN
Status: DISCONTINUED | OUTPATIENT
Start: 2024-01-01 | End: 2024-01-01

## 2024-01-01 RX ORDER — SCOLOPAMINE TRANSDERMAL SYSTEM 1 MG/1
1 PATCH, EXTENDED RELEASE TRANSDERMAL
Status: DISCONTINUED | OUTPATIENT
Start: 2024-01-01 | End: 2024-03-10

## 2024-01-01 RX ORDER — HALOPERIDOL 5 MG/ML
1 INJECTION INTRAMUSCULAR
Status: DISCONTINUED | OUTPATIENT
Start: 2024-01-01 | End: 2024-03-10

## 2024-01-01 RX ORDER — LORAZEPAM 2 MG/ML
0.5 INJECTION INTRAMUSCULAR ONCE
Status: COMPLETED | OUTPATIENT
Start: 2024-01-01 | End: 2024-01-01

## 2024-01-01 RX ORDER — SODIUM CHLORIDE 9 MG/ML
INJECTION, SOLUTION INTRAVENOUS CONTINUOUS
Status: DISCONTINUED | OUTPATIENT
Start: 2024-01-01 | End: 2024-01-01

## 2024-01-01 RX ORDER — POLYETHYLENE GLYCOL 3350 17 G/17G
17 POWDER, FOR SOLUTION ORAL DAILY PRN
Status: DISCONTINUED | OUTPATIENT
Start: 2024-01-01 | End: 2024-01-01

## 2024-01-01 RX ORDER — HEPARIN SODIUM 5000 [USP'U]/ML
7500 INJECTION, SOLUTION INTRAVENOUS; SUBCUTANEOUS EVERY 8 HOURS SCHEDULED
Status: DISCONTINUED | OUTPATIENT
Start: 2024-01-01 | End: 2024-01-01

## 2024-01-01 RX ORDER — IPRATROPIUM BROMIDE AND ALBUTEROL SULFATE 2.5; .5 MG/3ML; MG/3ML
3 SOLUTION RESPIRATORY (INHALATION) EVERY 6 HOURS PRN
Status: DISCONTINUED | OUTPATIENT
Start: 2024-01-01 | End: 2024-01-01

## 2024-01-01 RX ORDER — ALBUTEROL SULFATE 90 UG/1
2 AEROSOL, METERED RESPIRATORY (INHALATION) 4 TIMES DAILY
Status: DISCONTINUED | OUTPATIENT
Start: 2024-01-01 | End: 2024-01-01

## 2024-01-01 RX ORDER — IPRATROPIUM BROMIDE AND ALBUTEROL SULFATE 2.5; .5 MG/3ML; MG/3ML
SOLUTION RESPIRATORY (INHALATION)
Status: DISPENSED
Start: 2024-01-01 | End: 2024-01-01

## 2024-01-01 RX ORDER — METHYLPREDNISOLONE SODIUM SUCCINATE 125 MG/2ML
60 INJECTION, POWDER, LYOPHILIZED, FOR SOLUTION INTRAMUSCULAR; INTRAVENOUS EVERY 6 HOURS
Status: DISCONTINUED | OUTPATIENT
Start: 2024-01-01 | End: 2024-03-10

## 2024-01-01 RX ORDER — ACETAMINOPHEN 500 MG
500 TABLET ORAL EVERY 4 HOURS PRN
Status: DISCONTINUED | OUTPATIENT
Start: 2024-01-01 | End: 2024-01-01

## 2024-01-01 RX ORDER — SULFAMETHOXAZOLE AND TRIMETHOPRIM 800; 160 MG/1; MG/1
1 TABLET ORAL DAILY
Status: DISCONTINUED | OUTPATIENT
Start: 2024-01-01 | End: 2024-01-01

## 2024-01-01 RX ORDER — METHYLPREDNISOLONE SODIUM SUCCINATE 125 MG/2ML
60 INJECTION, POWDER, LYOPHILIZED, FOR SOLUTION INTRAMUSCULAR; INTRAVENOUS EVERY 6 HOURS
Status: DISCONTINUED | OUTPATIENT
Start: 2024-01-01 | End: 2024-01-01

## 2024-01-01 RX ORDER — ENEMA 19; 7 G/133ML; G/133ML
1 ENEMA RECTAL ONCE AS NEEDED
Status: DISCONTINUED | OUTPATIENT
Start: 2024-01-01 | End: 2024-01-01

## 2024-01-01 RX ORDER — LORAZEPAM 2 MG/ML
1 INJECTION INTRAMUSCULAR EVERY 4 HOURS PRN
Status: DISCONTINUED | OUTPATIENT
Start: 2024-01-01 | End: 2024-03-10

## 2024-01-01 RX ORDER — LISINOPRIL 20 MG/1
20 TABLET ORAL DAILY
Status: DISCONTINUED | OUTPATIENT
Start: 2024-01-01 | End: 2024-01-01

## 2024-01-01 RX ORDER — MORPHINE SULFATE 2 MG/ML
2 INJECTION, SOLUTION INTRAMUSCULAR; INTRAVENOUS EVERY 2 HOUR PRN
Status: DISCONTINUED | OUTPATIENT
Start: 2024-01-01 | End: 2024-01-01

## 2024-01-01 RX ORDER — FLUTICASONE FUROATE AND VILANTEROL 200; 25 UG/1; UG/1
1 POWDER RESPIRATORY (INHALATION) DAILY
Status: DISCONTINUED | OUTPATIENT
Start: 2024-01-01 | End: 2024-01-01

## 2024-01-01 RX ORDER — MORPHINE SULFATE 2 MG/ML
1 INJECTION, SOLUTION INTRAMUSCULAR; INTRAVENOUS
Status: DISCONTINUED | OUTPATIENT
Start: 2024-01-01 | End: 2024-03-10

## 2024-01-01 RX ORDER — METHYLPREDNISOLONE SODIUM SUCCINATE 125 MG/2ML
125 INJECTION, POWDER, LYOPHILIZED, FOR SOLUTION INTRAMUSCULAR; INTRAVENOUS ONCE
Status: COMPLETED | OUTPATIENT
Start: 2024-01-01 | End: 2024-01-01

## 2024-01-01 RX ORDER — METHYLPREDNISOLONE SODIUM SUCCINATE 125 MG/2ML
60 INJECTION, POWDER, LYOPHILIZED, FOR SOLUTION INTRAMUSCULAR; INTRAVENOUS EVERY 6 HOURS
Status: CANCELLED | OUTPATIENT
Start: 2024-01-01

## 2024-01-01 RX ORDER — IPRATROPIUM BROMIDE AND ALBUTEROL SULFATE 2.5; .5 MG/3ML; MG/3ML
3 SOLUTION RESPIRATORY (INHALATION) EVERY 6 HOURS PRN
Status: DISCONTINUED | OUTPATIENT
Start: 2024-01-01 | End: 2024-03-10

## 2024-01-01 RX ORDER — SODIUM CHLORIDE 0.9 % (FLUSH) 0.9 %
10 SYRINGE (ML) INJECTION AS NEEDED
Status: DISCONTINUED | OUTPATIENT
Start: 2024-01-01 | End: 2024-03-10

## 2024-01-01 RX ORDER — IPRATROPIUM BROMIDE AND ALBUTEROL SULFATE 2.5; .5 MG/3ML; MG/3ML
3 SOLUTION RESPIRATORY (INHALATION) EVERY 6 HOURS PRN
Status: CANCELLED | OUTPATIENT
Start: 2024-01-01

## 2024-01-01 RX ORDER — METHYLPREDNISOLONE SODIUM SUCCINATE 125 MG/2ML
60 INJECTION, POWDER, LYOPHILIZED, FOR SOLUTION INTRAMUSCULAR; INTRAVENOUS EVERY 8 HOURS
Status: DISCONTINUED | OUTPATIENT
Start: 2024-01-01 | End: 2024-01-01

## 2024-01-01 RX ORDER — ACETAMINOPHEN 650 MG/1
650 SUPPOSITORY RECTAL EVERY 4 HOURS PRN
Status: DISCONTINUED | OUTPATIENT
Start: 2024-01-01 | End: 2024-03-10

## 2024-01-01 RX ORDER — ATROPINE SULFATE 10 MG/ML
2 SOLUTION/ DROPS OPHTHALMIC EVERY 2 HOUR PRN
Status: DISCONTINUED | OUTPATIENT
Start: 2024-01-01 | End: 2024-03-10

## 2024-01-01 RX ORDER — IPRATROPIUM BROMIDE AND ALBUTEROL SULFATE 2.5; .5 MG/3ML; MG/3ML
3 SOLUTION RESPIRATORY (INHALATION) ONCE
Status: COMPLETED | OUTPATIENT
Start: 2024-01-01 | End: 2024-01-01

## 2024-01-01 RX ORDER — DIPHENHYDRAMINE HCL 25 MG
25 CAPSULE ORAL ONCE
Status: COMPLETED | OUTPATIENT
Start: 2024-01-01 | End: 2024-01-01

## 2024-01-10 ENCOUNTER — HOSPITAL ENCOUNTER (OUTPATIENT)
Dept: GENERAL RADIOLOGY | Facility: HOSPITAL | Age: 69
Discharge: HOME | End: 2024-01-10
Attending: FAMILY MEDICINE
Payer: COMMERCIAL

## 2024-01-10 DIAGNOSIS — R05.9 COUGH: ICD-10-CM

## 2024-01-10 PROCEDURE — 71046 X-RAY EXAM CHEST 2 VIEWS: CPT | Performed by: FAMILY MEDICINE

## 2024-02-12 ENCOUNTER — APPOINTMENT (OUTPATIENT)
Dept: GENERAL RADIOLOGY | Facility: HOSPITAL | Age: 69
End: 2024-02-12
Payer: MEDICARE

## 2024-02-12 ENCOUNTER — APPOINTMENT (OUTPATIENT)
Dept: GENERAL RADIOLOGY | Facility: HOSPITAL | Age: 69
End: 2024-02-12
Attending: EMERGENCY MEDICINE
Payer: MEDICARE

## 2024-02-12 ENCOUNTER — HOSPITAL ENCOUNTER (INPATIENT)
Facility: HOSPITAL | Age: 69
LOS: 2 days | Discharge: HOME | End: 2024-02-14
Attending: EMERGENCY MEDICINE | Admitting: STUDENT IN AN ORGANIZED HEALTH CARE EDUCATION/TRAINING PROGRAM
Payer: MEDICARE

## 2024-02-12 DIAGNOSIS — D86.9 SARCOIDOSIS: ICD-10-CM

## 2024-02-12 DIAGNOSIS — J96.01 ACUTE HYPOXIC RESPIRATORY FAILURE (HCC): Primary | ICD-10-CM

## 2024-02-12 DIAGNOSIS — J45.901 REACTIVE AIRWAY DISEASE WITH ACUTE EXACERBATION, UNSPECIFIED ASTHMA SEVERITY, UNSPECIFIED WHETHER PERSISTENT (HCC): ICD-10-CM

## 2024-02-12 PROBLEM — R09.02 HYPOXIA: Status: ACTIVE | Noted: 2024-01-01

## 2024-02-12 PROBLEM — R09.02 HYPOXIA: Status: ACTIVE | Noted: 2024-02-12

## 2024-02-12 LAB
ALBUMIN SERPL-MCNC: 3.9 G/DL (ref 3.2–4.8)
ALBUMIN/GLOB SERPL: 1.1 {RATIO} (ref 1–2)
ALP LIVER SERPL-CCNC: 105 U/L
ALT SERPL-CCNC: 12 U/L
ANION GAP SERPL CALC-SCNC: 7 MMOL/L (ref 0–18)
AST SERPL-CCNC: 16 U/L (ref ?–34)
BASE EXCESS BLD CALC-SCNC: 7 MMOL/L (ref ?–2)
BASOPHILS # BLD AUTO: 0.05 X10(3) UL (ref 0–0.2)
BASOPHILS NFR BLD AUTO: 0.5 %
BILIRUB SERPL-MCNC: 0.2 MG/DL (ref 0.2–1.1)
BNP SERPL-MCNC: 4 PG/ML
BUN BLD-MCNC: 13 MG/DL (ref 9–23)
BUN/CREAT SERPL: 14.6 (ref 10–20)
CALCIUM BLD-MCNC: 8.9 MG/DL (ref 8.7–10.4)
CHLORIDE SERPL-SCNC: 102 MMOL/L (ref 98–112)
CO2 SERPL-SCNC: 28 MMOL/L (ref 21–32)
CREAT BLD-MCNC: 0.89 MG/DL
DEPRECATED RDW RBC AUTO: 45.7 FL (ref 35.1–46.3)
EGFRCR SERPLBLD CKD-EPI 2021: 71 ML/MIN/1.73M2 (ref 60–?)
EOSINOPHIL # BLD AUTO: 0.14 X10(3) UL (ref 0–0.7)
EOSINOPHIL NFR BLD AUTO: 1.3 %
ERYTHROCYTE [DISTWIDTH] IN BLOOD BY AUTOMATED COUNT: 15.9 % (ref 11–15)
FLUAV + FLUBV RNA SPEC NAA+PROBE: NEGATIVE
FLUAV + FLUBV RNA SPEC NAA+PROBE: NEGATIVE
GLOBULIN PLAS-MCNC: 3.6 G/DL (ref 2.8–4.4)
GLUCOSE BLD-MCNC: 102 MG/DL (ref 70–99)
HCO3 BLDV-SCNC: 29.5 MEQ/L (ref 22–26)
HCT VFR BLD AUTO: 38.2 %
HGB BLD-MCNC: 11.7 G/DL
IMM GRANULOCYTES # BLD AUTO: 0.07 X10(3) UL (ref 0–1)
IMM GRANULOCYTES NFR BLD: 0.6 %
LACTATE SERPL-SCNC: 1.3 MMOL/L (ref 0.5–2)
LYMPHOCYTES # BLD AUTO: 2.36 X10(3) UL (ref 1–4)
LYMPHOCYTES NFR BLD AUTO: 21.6 %
MCH RBC QN AUTO: 24.1 PG (ref 26–34)
MCHC RBC AUTO-ENTMCNC: 30.6 G/DL (ref 31–37)
MCV RBC AUTO: 78.8 FL
MONOCYTES # BLD AUTO: 0.79 X10(3) UL (ref 0.1–1)
MONOCYTES NFR BLD AUTO: 7.2 %
NEUTROPHILS # BLD AUTO: 7.53 X10 (3) UL (ref 1.5–7.7)
NEUTROPHILS # BLD AUTO: 7.53 X10(3) UL (ref 1.5–7.7)
NEUTROPHILS NFR BLD AUTO: 68.8 %
OSMOLALITY SERPL CALC.SUM OF ELEC: 284 MOSM/KG (ref 275–295)
OXYGEN LITERS/MINUTE: 8 L/MIN
PCO2 BLDV: 42 MM HG (ref 38–50)
PH BLDV: 7.48 [PH] (ref 7.32–7.43)
PLATELET # BLD AUTO: 312 10(3)UL (ref 150–450)
PO2 BLDV: 31 MM HG (ref 35–40)
POTASSIUM SERPL-SCNC: 3.6 MMOL/L (ref 3.5–5.1)
PROT SERPL-MCNC: 7.5 G/DL (ref 5.7–8.2)
PUNCTURE CHARGE: NO
RBC # BLD AUTO: 4.85 X10(6)UL
RSV RNA SPEC NAA+PROBE: NEGATIVE
SAO2 % BLDV: 59.5 % (ref 60–85)
SARS-COV-2 RNA RESP QL NAA+PROBE: NOT DETECTED
SODIUM SERPL-SCNC: 137 MMOL/L (ref 136–145)
TROPONIN I SERPL HS-MCNC: 4 NG/L
WBC # BLD AUTO: 10.9 X10(3) UL (ref 4–11)

## 2024-02-12 PROCEDURE — 99223 1ST HOSP IP/OBS HIGH 75: CPT | Performed by: INTERNAL MEDICINE

## 2024-02-12 PROCEDURE — 71045 X-RAY EXAM CHEST 1 VIEW: CPT | Performed by: EMERGENCY MEDICINE

## 2024-02-12 RX ORDER — LISINOPRIL 20 MG/1
20 TABLET ORAL DAILY
Status: DISCONTINUED | OUTPATIENT
Start: 2024-02-12 | End: 2024-02-14

## 2024-02-12 RX ORDER — DOXYCYCLINE HYCLATE 100 MG/1
100 CAPSULE ORAL ONCE
Status: COMPLETED | OUTPATIENT
Start: 2024-02-12 | End: 2024-02-12

## 2024-02-12 RX ORDER — ACETAMINOPHEN 500 MG
500 TABLET ORAL EVERY 4 HOURS PRN
Status: DISCONTINUED | OUTPATIENT
Start: 2024-02-12 | End: 2024-02-14

## 2024-02-12 RX ORDER — ALBUTEROL SULFATE 90 UG/1
2 AEROSOL, METERED RESPIRATORY (INHALATION) EVERY 4 HOURS PRN
Status: DISCONTINUED | OUTPATIENT
Start: 2024-02-12 | End: 2024-02-14

## 2024-02-12 RX ORDER — IPRATROPIUM BROMIDE AND ALBUTEROL SULFATE 2.5; .5 MG/3ML; MG/3ML
3 SOLUTION RESPIRATORY (INHALATION) EVERY 4 HOURS PRN
Status: DISCONTINUED | OUTPATIENT
Start: 2024-02-12 | End: 2024-02-14

## 2024-02-12 RX ORDER — HYDROCHLOROTHIAZIDE 25 MG/1
25 TABLET ORAL DAILY
Status: ON HOLD | COMMUNITY
End: 2024-02-26

## 2024-02-12 RX ORDER — MONTELUKAST SODIUM 10 MG/1
10 TABLET ORAL ONCE AS NEEDED
Status: ON HOLD | COMMUNITY

## 2024-02-12 RX ORDER — IPRATROPIUM BROMIDE AND ALBUTEROL SULFATE 2.5; .5 MG/3ML; MG/3ML
3 SOLUTION RESPIRATORY (INHALATION) EVERY 4 HOURS PRN
Status: ON HOLD | COMMUNITY

## 2024-02-12 RX ORDER — HEPARIN SODIUM 5000 [USP'U]/ML
7500 INJECTION, SOLUTION INTRAVENOUS; SUBCUTANEOUS EVERY 12 HOURS SCHEDULED
Status: DISCONTINUED | OUTPATIENT
Start: 2024-02-12 | End: 2024-02-14

## 2024-02-12 RX ORDER — PANTOPRAZOLE SODIUM 40 MG/1
40 TABLET, DELAYED RELEASE ORAL
Status: ON HOLD | COMMUNITY

## 2024-02-12 RX ORDER — HYDROCHLOROTHIAZIDE 25 MG/1
25 TABLET ORAL DAILY
Status: DISCONTINUED | OUTPATIENT
Start: 2024-02-12 | End: 2024-02-12

## 2024-02-12 RX ORDER — METHYLPREDNISOLONE SODIUM SUCCINATE 125 MG/2ML
125 INJECTION, POWDER, LYOPHILIZED, FOR SOLUTION INTRAMUSCULAR; INTRAVENOUS ONCE
Status: COMPLETED | OUTPATIENT
Start: 2024-02-12 | End: 2024-02-12

## 2024-02-12 RX ORDER — DOXYCYCLINE HYCLATE 100 MG/1
100 CAPSULE ORAL EVERY 12 HOURS SCHEDULED
Status: DISCONTINUED | OUTPATIENT
Start: 2024-02-13 | End: 2024-02-14

## 2024-02-12 RX ORDER — SODIUM CHLORIDE 9 MG/ML
INJECTION, SOLUTION INTRAVENOUS CONTINUOUS
Status: DISCONTINUED | OUTPATIENT
Start: 2024-02-12 | End: 2024-02-14

## 2024-02-12 RX ORDER — PANTOPRAZOLE SODIUM 40 MG/1
40 TABLET, DELAYED RELEASE ORAL
Status: DISCONTINUED | OUTPATIENT
Start: 2024-02-13 | End: 2024-02-14

## 2024-02-13 ENCOUNTER — APPOINTMENT (OUTPATIENT)
Dept: CT IMAGING | Facility: HOSPITAL | Age: 69
End: 2024-02-13
Attending: INTERNAL MEDICINE
Payer: MEDICARE

## 2024-02-13 LAB
ALBUMIN SERPL-MCNC: 3.9 G/DL (ref 3.2–4.8)
ALBUMIN/GLOB SERPL: 1 {RATIO} (ref 1–2)
ALP LIVER SERPL-CCNC: 97 U/L
ALT SERPL-CCNC: 12 U/L
ANION GAP SERPL CALC-SCNC: 3 MMOL/L (ref 0–18)
AST SERPL-CCNC: 13 U/L (ref ?–34)
ATRIAL RATE: 109 BPM
BASOPHILS # BLD AUTO: 0.01 X10(3) UL (ref 0–0.2)
BASOPHILS NFR BLD AUTO: 0.1 %
BILIRUB SERPL-MCNC: 0.3 MG/DL (ref 0.2–1.1)
BUN BLD-MCNC: 12 MG/DL (ref 9–23)
BUN/CREAT SERPL: 14.6 (ref 10–20)
CALCIUM BLD-MCNC: 9 MG/DL (ref 8.7–10.4)
CHLORIDE SERPL-SCNC: 104 MMOL/L (ref 98–112)
CO2 SERPL-SCNC: 28 MMOL/L (ref 21–32)
CREAT BLD-MCNC: 0.82 MG/DL
CRP SERPL-MCNC: 4.3 MG/DL (ref ?–1)
DEPRECATED RDW RBC AUTO: 45.2 FL (ref 35.1–46.3)
EGFRCR SERPLBLD CKD-EPI 2021: 78 ML/MIN/1.73M2 (ref 60–?)
EOSINOPHIL # BLD AUTO: 0 X10(3) UL (ref 0–0.7)
EOSINOPHIL NFR BLD AUTO: 0 %
ERYTHROCYTE [DISTWIDTH] IN BLOOD BY AUTOMATED COUNT: 15.9 % (ref 11–15)
ERYTHROCYTE [SEDIMENTATION RATE] IN BLOOD: 62 MM/HR
GLOBULIN PLAS-MCNC: 3.9 G/DL (ref 2.8–4.4)
GLUCOSE BLD-MCNC: 130 MG/DL (ref 70–99)
HCT VFR BLD AUTO: 37.9 %
HGB BLD-MCNC: 11.6 G/DL
IMM GRANULOCYTES # BLD AUTO: 0.04 X10(3) UL (ref 0–1)
IMM GRANULOCYTES NFR BLD: 0.6 %
LDH SERPL L TO P-CCNC: 247 U/L
LYMPHOCYTES # BLD AUTO: 1.13 X10(3) UL (ref 1–4)
LYMPHOCYTES NFR BLD AUTO: 16.7 %
MCH RBC QN AUTO: 24.1 PG (ref 26–34)
MCHC RBC AUTO-ENTMCNC: 30.6 G/DL (ref 31–37)
MCV RBC AUTO: 78.6 FL
MONOCYTES # BLD AUTO: 0.07 X10(3) UL (ref 0.1–1)
MONOCYTES NFR BLD AUTO: 1 %
NEUTROPHILS # BLD AUTO: 5.51 X10 (3) UL (ref 1.5–7.7)
NEUTROPHILS # BLD AUTO: 5.51 X10(3) UL (ref 1.5–7.7)
NEUTROPHILS NFR BLD AUTO: 81.6 %
OSMOLALITY SERPL CALC.SUM OF ELEC: 282 MOSM/KG (ref 275–295)
P AXIS: 48 DEGREES
P-R INTERVAL: 144 MS
PLATELET # BLD AUTO: 313 10(3)UL (ref 150–450)
POTASSIUM SERPL-SCNC: 4.1 MMOL/L (ref 3.5–5.1)
PROCALCITONIN SERPL-MCNC: <0.04 NG/ML (ref ?–0.05)
PROT SERPL-MCNC: 7.8 G/DL (ref 5.7–8.2)
Q-T INTERVAL: 318 MS
QRS DURATION: 76 MS
QTC CALCULATION (BEZET): 428 MS
R AXIS: -1 DEGREES
RBC # BLD AUTO: 4.82 X10(6)UL
SODIUM SERPL-SCNC: 135 MMOL/L (ref 136–145)
T AXIS: 72 DEGREES
VENTRICULAR RATE: 109 BPM
WBC # BLD AUTO: 6.8 X10(3) UL (ref 4–11)

## 2024-02-13 PROCEDURE — 71250 CT THORAX DX C-: CPT | Performed by: INTERNAL MEDICINE

## 2024-02-13 PROCEDURE — 99233 SBSQ HOSP IP/OBS HIGH 50: CPT | Performed by: HOSPITALIST

## 2024-02-13 PROCEDURE — 99223 1ST HOSP IP/OBS HIGH 75: CPT | Performed by: INTERNAL MEDICINE

## 2024-02-13 RX ORDER — ALBUTEROL SULFATE 2.5 MG/3ML
2.5 SOLUTION RESPIRATORY (INHALATION) 2 TIMES DAILY
Status: DISCONTINUED | OUTPATIENT
Start: 2024-02-13 | End: 2024-02-14

## 2024-02-13 RX ORDER — FLUTICASONE FUROATE AND VILANTEROL 200; 25 UG/1; UG/1
1 POWDER RESPIRATORY (INHALATION) DAILY
Status: DISCONTINUED | OUTPATIENT
Start: 2024-02-13 | End: 2024-02-14

## 2024-02-13 RX ORDER — METHYLPREDNISOLONE SODIUM SUCCINATE 40 MG/ML
40 INJECTION, POWDER, LYOPHILIZED, FOR SOLUTION INTRAMUSCULAR; INTRAVENOUS EVERY 12 HOURS
Status: DISCONTINUED | OUTPATIENT
Start: 2024-02-13 | End: 2024-02-14

## 2024-02-13 NOTE — PLAN OF CARE
No acute changes. Ambulating in room independently. IV fluids infusing. On room air. All safety measures in place.     Problem: Patient Centered Care  Goal: Patient preferences are identified and integrated in the patient's plan of care  Description: Interventions:  - What would you like us to know as we care for you? From home with family  - Provide timely, complete, and accurate information to patient/family  - Incorporate patient and family knowledge, values, beliefs, and cultural backgrounds into the planning and delivery of care  - Encourage patient/family to participate in care and decision-making at the level they choose  - Honor patient and family perspectives and choices  Outcome: Progressing     Problem: Patient/Family Goals  Goal: Patient/Family Long Term Goal  Description: Patient's Long Term Goal: to go home    Interventions:  - Monitor vital signs  - Monitor appropriate labs  - Administer medications per order  - Pain management as needed  - Follow MD orders  - Diagnostics per orders  - Update / inform patient and family on plan of care  - Discharge planning      - See additional Care Plan goals for specific interventions  Outcome: Progressing  Goal: Patient/Family Short Term Goal  Description: Patient's Short Term Goal: to breathe better    Interventions:   - PRN neb tx  -follow MD orders  - See additional Care Plan goals for specific interventions  Outcome: Progressing     Problem: RESPIRATORY - ADULT  Goal: Achieves optimal ventilation and oxygenation  Description: INTERVENTIONS:  - Assess for changes in respiratory status  - Assess for changes in mentation and behavior  - Position to facilitate oxygenation and minimize respiratory effort  - Oxygen supplementation based on oxygen saturation or ABGs  - Provide Smoking Cessation handout, if applicable  - Encourage broncho-pulmonary hygiene including cough, deep breathe, Incentive Spirometry  - Assess the need for suctioning and perform as needed  -  Assess and instruct to report SOB or any respiratory difficulty  - Respiratory Therapy support as indicated  - Manage/alleviate anxiety  - Monitor for signs/symptoms of CO2 retention  2/13/2024 1214 by Anastasia Valles, RN  Outcome: Progressing  2/13/2024 1214 by Anastasia Valles, RN  Outcome: Progressing     Problem: DISCHARGE PLANNING  Goal: Discharge to home or other facility with appropriate resources  Description: INTERVENTIONS:  - Identify barriers to discharge w/pt and caregiver  - Include patient/family/discharge partner in discharge planning  - Arrange for needed discharge resources and transportation as appropriate  - Identify discharge learning needs (meds, wound care, etc)  - Arrange for interpreters to assist at discharge as needed  - Consider post-discharge preferences of patient/family/discharge partner  - Complete POLST form as appropriate  - Assess patient's ability to be responsible for managing their own health  - Refer to Case Management Department for coordinating discharge planning if the patient needs post-hospital services based on physician/LIP order or complex needs related to functional status, cognitive ability or social support system  Outcome: Progressing

## 2024-02-13 NOTE — ED PROVIDER NOTES
Patient Seen in: [facility] Emergency Department    History     Chief Complaint   Patient presents with    Difficulty Breathing       HPI    68-year-old female with a history of sarcoidosis, reactive airway disease who presents with 2 weeks of worsening dyspnea on exertion, cough, tried inhalers earlier today with minimal relief.  Denies any chest pain or lower extremity edema.    History reviewed.   Past Medical History:   Diagnosis Date    Hypertension        History reviewed. No past surgical history on file.      Medications :  (Not in a hospital admission)       No family history on file.    Smoking Status:   Social History     Socioeconomic History    Marital status:    Tobacco Use    Smoking status: Former    Smokeless tobacco: Former   Substance and Sexual Activity    Alcohol use: Not Currently    Drug use: Never       Constitutional and vital signs reviewed.      Social History and Family History elements reviewed from today, pertinent positives to the presenting problem noted.    Physical Exam     ED Triage Vitals [02/12/24 1910]   /69   Pulse 115   Resp (!) 28   Temp 98 °F (36.7 °C)   Temp src    SpO2 (!) 87 %   O2 Device None (Room air)       All measures to prevent infection transmission during my interaction with the patient were taken. The patient was already wearing a droplet mask on my arrival to the room. Personal protective equipment was worn throughout the duration of the exam.  Handwashing was performed prior to and after the exam.  Stethoscope and any equipment used during my examination was cleaned with super sani-cloth germicidal wipes following the exam.     Physical Exam    General: No acute distress  Head: Normocephalic and atraumatic.  Mouth/Throat/Ears/Nose: Oropharynx is clear and moist.   Eyes: Conjunctivae and EOM are normal.   Neck: Normal range of motion. Supple.   Cardiovascular: Normal rate, regular rhythm, normal heart sounds.  Respiratory/Chest: Wheezing in  all lung fields, mild tachypnea.  Gastrointestinal: Soft, non-tender, non-distended. Bowel sounds are normal.   Musculoskeletal:No swelling or deformity.   Neurological: Alert and appropriate. No focal deficits.   Skin: Skin is warm and dry. No pallor.  Psychiatric: Has a normal mood and affect.      ED Course        Labs Reviewed   COMP METABOLIC PANEL (14) - Abnormal; Notable for the following components:       Result Value    Glucose 102 (*)     All other components within normal limits   VENOUS BLOOD GAS - Abnormal; Notable for the following components:    Venous pH 7.48 (*)     Venous pO2 31 (*)     Venous Bicarbonate 29.5 (*)     Blood Gas Base Excess 7.0 (*)     Venous O2 Saturation 59.5 (*)     All other components within normal limits   CBC W/ DIFFERENTIAL - Abnormal; Notable for the following components:    HGB 11.7 (*)     MCV 78.8 (*)     MCH 24.1 (*)     MCHC 30.6 (*)     RDW 15.9 (*)     All other components within normal limits   TROPONIN I HIGH SENSITIVITY - Normal   BNP (B TYPE NATRIURETIC PEPTIDE) - Normal   SARS-COV-2/FLU A AND B/RSV BY PCR (Status4PERT) - Normal    Narrative:     This test is intended for the qualitative detection and differentiation of SARS-CoV-2, influenza A, influenza B, and respiratory syncytial virus (RSV) viral RNA in nasopharyngeal or nares swabs from individuals suspected of respiratory viral infection consistent with COVID-19 by their healthcare provider. Signs and symptoms of respiratory viral infection due to SARS-CoV-2, influenza, and RSV can be similar.                                    Test performed using the Xpert Xpress SARS-CoV-2/FLU/RSV (real time RT-PCR)  assay on the [employer] instrument, [employer], [place] [address].                   This test is being used under the Food and Drug Administration's Emergency Use Authorization.                                    The authorized Fact Sheet for Healthcare Providers for this assay is available upon request from  the laboratory.   CBC WITH DIFFERENTIAL WITH PLATELET    Narrative:     The following orders were created for panel order CBC With Differential With Platelet.                  Procedure                               Abnormality         Status                                     ---------                               -----------         ------                                     CBC W/ DIFFERENTIAL[[id number]]          Abnormal            Final result                                                 Please view results for these tests on the individual orders.   LACTIC ACID, PLASMA   RAINBOW DRAW LAVENDER   RAINBOW DRAW LIGHT GREEN   RAINBOW DRAW BLUE   BLOOD CULTURE   BLOOD CULTURE     EKG    Rate, intervals and axes as noted on EKG Report.  Rate: 109  Rhythm: Sinus Rhythm  Reading: Sinus tachycardia rate 109, no STEMI.  This is my interpretation.           As Interpreted by me    Imaging Results Available and Reviewed while in ED: XR CHEST AP PORTABLE  (CPT=71045)    Result Date: 2/12/2024  CONCLUSION:   Extensive bilateral multifocal airspace opacities, at least moderately progressed compared to 01/10/2024.  Findings are concerning for multifocal aspiration/pneumonia.  Small right and small to moderate left pleural effusions.  Background of pulmonary fibrosis     Dictated by (CST): Jessica Starkey MD on 2/12/2024 at 8:34 PM     Finalized by (CST): Jessica Starkey MD on 2/12/2024 at 8:36 PM         ED Medications Administered:   Medications   ipratropium (Atrovent) 0.02 % nebulizer solution 1 mg (1 mg Nebulization New Bag 2/12/24 1944)   cefTRIAXone (Rocephin) 2 g in D5W 100 mL IVPB-ADD (has no administration in time range)   albuterol (Ventolin) (5 MG/ML) 0.5% nebulizer solution 10 mg (10 mg Nebulization Given 2/12/24 1929)   methylPREDNISolone sodium succinate (Solu-MEDROL) injection 125 mg (125 mg Intravenous Given 2/12/24 1955)   doxycycline (Vibramycin) cap 100 mg (100 mg Oral Given 2/12/24 1955)         MDM      Vitals:    02/12/24 1910 02/12/24 1913 02/12/24 1930   BP: 157/69  149/66   Pulse: 115  106   Resp: (!) 28  (!) 27   Temp: 98 °F (36.7 °C)     SpO2: (!) 87% 95% 97%     *I personally reviewed and interpreted all ED vitals.    Pulse Ox: 97%, Room air, Normal     Monitor Interpretation:   sinus tachycardia as interpreted by me.  The cardiac monitor was ordered given dyspnea.      Medical Decision Making      Differential Diagnosis/ Diagnostic Considerations: Pneumonia, reactive airway disease exacerbation, pulmonary sarcoidosis    Complicating Factors: The patient already has does not have any pertinent problems on file. to contribute to the complexity of this ED evaluation.    I reviewed prior chart records including ED visit note from July 7, 2019.  Patient here with acute hypoxic respiratory failure requiring 2 L nasal cannula.  Significant wheezing on exam, provided with hour-long nebulizers, Solu-Medrol with improvement.  Admitted to and discussed with . Consulted Dr. Rawls.  Chest x-ray on my interpretation with multifocal pneumonia versus worsening pulmonary sarcoidosis, ceftriaxone and doxycycline ordered.  Labs reviewed and unremarkable for acute findings on my interpretation.    Admitted in stable condition.  Patient is comfortable with the plan.    I spent 35 minutes of critical care time caring for this patient. This does not include time spent on separately reported billable procedures.       Disposition and Plan     Clinical Impression:  1. Acute hypoxic respiratory failure (HCC)    2. Sarcoidosis    3. Reactive airway disease with acute exacerbation, unspecified asthma severity, unspecified whether persistent        Disposition:  Admit    Follow-up:  No follow-up provider specified.    Medications Prescribed:  Current Discharge Medication List          Hospital Problems       Present on Admission  Date Reviewed: 2/18/2020            ICD-10-CM Noted POA    Hypoxia R09.02 2/12/2024 Unknown

## 2024-02-13 NOTE — PROGRESS NOTES
[facility] Pharmacy Dosing Service  Antibiotic Dosing    Freya Martinez is a 68 year old for whom pharmacy is dosing Ceftriaxone and Doxycycline for treatment of  pneumonia. .  Other antibiotics (Not dosed by pharmacy):     Allergies: has No Known Allergies.    Vitals: BP (!) 166/70 (BP Location: Right arm)   Pulse 95   Temp 98.7 °F (37.1 °C) (Oral)   Resp 20   Ht 1.702 m (5' 7.01\")   Wt 124.4 kg (274 lb 4.8 oz)   LMP  (LMP Unknown)   SpO2 97%   BMI 42.95 kg/m²     Labs:   WBC   Date Value Ref Range Status   02/12/2024 10.9 4.0 - 11.0 x10(3) uL Final     Creatinine   Date Value Ref Range Status   02/12/2024 0.89 0.55 - 1.02 mg/dL Final     BUN   Date Value Ref Range Status   02/12/2024 13 9 - 23 mg/dL Final       CrCl: estimated creatinine clearance is 59.6 mL/min (based on SCr of 0.89 mg/dL).    Cultures: BC Pending    Radiology: CXR  Extensive bilateral multifocal airspace opacities, at least moderately progressed compared to 01/10/2024.  Findings are concerning for multifocal aspiration/pneumonia.      Small right and small to moderate left pleural effusions.      Background of pulmonary fibrosis         Assessment/Plan: Pt wt= 124 kg - start Ceftriaxone 2gm ivpb q24h and Doxycycline 100mg po bid per consult    Pharmacy will continue to follow.  We appreciate the opportunity to assist in the care of this patient.    Thank you,   Umesh Lewis, PharmD  2/12/2024  11:46 PM

## 2024-02-13 NOTE — PROGRESS NOTES
[facility] Pharmacy Note:  Anticoagulation Weight Dose Adjustment for heparin    Freya Martinez is a 68 year old patient who has been prescribed heparin 5000 units every 12 hours.      Estimated Creatinine Clearance: 59.6 mL/min (based on SCr of 0.89 mg/dL). Body mass index is 42.95 kg/m². Wt Readings from Last 1 Encounters:   02/12/24 124.4 kg (274 lb 4.8 oz)        Per [employer] approved dose adjustment protocol for weight and renal function, the dose will be adjusted to heparin 7500 units every 12 hours.    Thank you,    Arley Harris, PharmD  2/12/2024  10:19 PM

## 2024-02-13 NOTE — RESPIRATORY THERAPY NOTE
Pt uses CPAP of 9 at home. Pt will use hospital CPAP while at the hospital. Pt explains she has a hx of asthma and sarcoidosis. Pt uses albuterol inhaler as needed.

## 2024-02-13 NOTE — H&P
[facility]    History and Physical    Freya Martinez Patient Status:  Inpatient    1955 MRN [id number]   Location [facility] 5SW/SE Attending Hina Chappell MD   Hosp Day # 0 PCP No primary care provider on file.     Date:  2024  Date of Admission:  2024    History provided by:patient  HPI:     Chief Complaint   Patient presents with    Difficulty Breathing     HPI    68 year old with hx of htn, sarcoidosis, reactive airway disease who presents for further evaluation of progressive shortness of breath, cough that began 2 weeks ago.  Was using her home inhalers with no improvement.     In the emergency room blood pressure elevated, pulse 101.  Acute hypoxic respiratory failure noted with a SpO2 of 87% on room air.  Placed on 2 L nasal cannula.  [place] analysis overall nonrevealing.  Chest x-ray revealed signs of bilateral multifocal airspace opacities progressed compared to x-ray done on January 10, 2024 concern for multifocal aspiration/pneumonia.  He was started on ceftriaxone and doxycycline.  Administered 125 mg methylprednisolone.  DuoNebs started.  Admitted for further treatment and evaluation.  Pulmonary consulted.    History     Past Medical History:   Diagnosis Date    Hypertension      No past surgical history on file.  No family history on file.  Social History:  Social History     Socioeconomic History    Marital status:    Tobacco Use    Smoking status: Former    Smokeless tobacco: Former   Substance and Sexual Activity    Alcohol use: Not Currently    Drug use: Never     Social Determinants of Health     Food Insecurity: No Food Insecurity (2024)    Food Insecurity     Food Insecurity: Never true   Transportation Needs: No Transportation Needs (2024)    Transportation Needs     Lack of Transportation: No   Housing Stability: Low Risk  (2024)    Housing Stability     Housing Instability: No     Allergies/Medications:   Allergies: No  Known Allergies  Medications Prior to Admission   Medication Sig    hydroCHLOROthiazide 25 MG Oral Tab Take 1 tablet (25 mg total) by mouth daily.    pantoprazole 40 MG Oral Tab EC Take 1 tablet (40 mg total) by mouth every morning before breakfast.    montelukast 10 MG Oral Tab Take 1 tablet (10 mg total) by mouth once as needed (allergies).    ipratropium-albuterol 0.5-2.5 (3) MG/3ML Inhalation Solution Take 3 mL by nebulization every 4 (four) hours as needed (for SOB/wheezing).    Albuterol Sulfate  (90 Base) MCG/ACT Inhalation Aero Soln INHALE TWO PUFFS PO QID PRN    lisinopril 20 MG Oral Tab Take 1 tablet (20 mg total) by mouth daily.    HYDROcodone-acetaminophen 7.5-325 MG Oral Tab TK 1 T PO  Q 6 H PRN P       Review of Systems:     Constitutional:  Positive for fatigue and fever.   HENT:  Positive for congestion.    Eyes: Negative.    Respiratory:  Positive for cough, shortness of breath and wheezing.    Cardiovascular: Negative.    Gastrointestinal: Negative.    Endocrine: Negative.    Genitourinary: Negative.    Musculoskeletal: Negative.    Skin: Negative.    Allergic/Immunologic: Negative.    Neurological: Negative.    Hematological: Negative.    Psychiatric/Behavioral: Negative.         Physical Exam:   Vital Signs:  Blood pressure (!) 166/70, pulse 101, temperature 98.7 °F (37.1 °C), temperature source Oral, resp. rate 20, height 5' 7.01\" (1.702 m), weight 274 lb 4.8 oz (124.4 kg), SpO2 97%, not currently breastfeeding.  Physical Exam  Constitutional:       General: She is in acute distress.      Appearance: She is obese.   HENT:      Head: Normocephalic.      Nose: Nose normal.   Eyes:      Pupils: Pupils are equal, round, and reactive to light.   Cardiovascular:      Rate and Rhythm: Normal rate and regular rhythm.      Pulses: Normal pulses.      Heart sounds: Normal heart sounds.   Pulmonary:      Effort: Respiratory distress present.   Abdominal:      General: Abdomen is flat.       Palpations: Abdomen is soft.   Skin:     General: Skin is warm.   Neurological:      General: No focal deficit present.      Mental Status: She is alert.   Psychiatric:         Mood and Affect: Mood normal.           Results:     Lab Results   Component Value Date    WBC 10.9 02/12/2024    HGB 11.7 (L) 02/12/2024    HCT 38.2 02/12/2024    .0 02/12/2024    CREATSERUM 0.89 02/12/2024    BUN 13 02/12/2024     02/12/2024    K 3.6 02/12/2024     02/12/2024    CO2 28.0 02/12/2024     (H) 02/12/2024    CA 8.9 02/12/2024    ALB 3.9 02/12/2024    ALKPHO 105 02/12/2024    BILT 0.2 02/12/2024    TP 7.5 02/12/2024    AST 16 02/12/2024    ALT 12 02/12/2024    TSH 1.790 05/12/2020    TROPHS 4 02/12/2024     XR CHEST AP PORTABLE  (CPT=71045)    Result Date: 2/12/2024  CONCLUSION:   Extensive bilateral multifocal airspace opacities, at least moderately progressed compared to 01/10/2024.  Findings are concerning for multifocal aspiration/pneumonia.  Small right and small to moderate left pleural effusions.  Background of pulmonary fibrosis     Dictated by (CST): Jessica Starkey MD on 2/12/2024 at 8:34 PM     Finalized by (CST): Jessica Starkey MD on 2/12/2024 at 8:36 PM         EKG    Result Date: 2/12/2024  Sinus tachycardia Minimal voltage criteria for LVH, may be normal variant ( R in aVL ) Borderline ECG No previous ECGs found in [facility]     Assessment/Plan:     Acute hypoxic respiratory failure (HCC)  -Chest x-ray findings concerning for multifocal pneumonia.  Administered ceftriaxone and doxycycline.  -Methylprednisolone administered.  Evaluate in AM, if able convert to oral steroids.     -Pulmonary consulted await further recs.  -Wean oxygen as tolerated.  -Respiratory therapy ordered.    Sarcoidosis  Reactive airway disease with acute exacerbation, unspecified asthma severity, unspecified whether persistent  Inhalers include albuterol   Albuterol PRN.  Follow with pulmonary.      HTN  -Include  hydrochlorothiazide and lisinopril.  Monitor BP closely in the setting of pneumonia.    diet: general  ivf: none  dvt prophylaxis: heparin subq  antibiotics: ceftriaxone and doxycycline.   tubes: none  central lines: none  code status: full code  dispo: home upon medical clearance    Greater than 70 minutes, >50% time spent counseling and coordinating care regarding treatment and workup.            Keith Lind MD  2/12/2024

## 2024-02-13 NOTE — PAYOR COMM NOTE
--------------  ADMISSION REVIEW     Payor: [employer]  Subscriber #:  [id number]  Authorization Number: [id number]    Admit date: 2/12/24  Admit time: 10:02 PM       History   HPI  68-year-old female with a history of sarcoidosis, reactive airway disease who presents with 2 weeks of worsening dyspnea on exertion, cough, tried inhalers earlier today with minimal relief.  Denies any chest pain or lower extremity edema.  ED Triage Vitals [02/12/24 1910]   /69   Pulse 115   Resp (!) 28   Temp 98 °F (36.7 °C)   SpO2 (!) 87 %   O2 Device None (Room air)     Head: Normocephalic and atraumatic.  Mouth/Throat/Ears/Nose: Oropharynx is clear and moist.   Eyes: Conjunctivae and EOM are normal.   Neck: Normal range of motion. Supple.   Cardiovascular: Normal rate, regular rhythm, normal heart sounds.  Respiratory/Chest: Wheezing in all lung fields, mild tachypnea.  Gastrointestinal: Soft, non-tender, non-distended. Bowel sounds are normal.   Musculoskeletal:No swelling or deformity.   Neurological: Alert and appropriate. No focal deficits.   Skin: Skin is warm and dry. No pallor.  Psychiatric: Has a normal mood and affect.    Labs Reviewed   COMP METABOLIC PANEL (14) - Abnormal; Notable for the following components:       Result Value    Glucose 102 (*)     All other components within normal limits   VENOUS BLOOD GAS - Abnormal; Notable for the following components:    Venous pH 7.48 (*)     Venous pO2 31 (*)     Venous Bicarbonate 29.5 (*)     Blood Gas Base Excess 7.0 (*)     Venous O2 Saturation 59.5 (*)     All other components within normal limits   CBC W/ DIFFERENTIAL - Abnormal; Notable for the following components:    HGB 11.7 (*)     MCV 78.8 (*)     MCH 24.1 (*)     MCHC 30.6 (*)     RDW 15.9 (*)    EKG    Rate, intervals and axes as noted on EKG Report.  Rate: 109  Rhythm: Sinus Rhythm  Reading: Sinus tachycardia rate 109, no STEMI.  This is my interpretation.    Imaging Results Available and Reviewed  while in ED: XR CHEST AP PORTABLE  (CPT=71045)  Result Date: 2/12/2024  CONCLUSION:   Extensive bilateral multifocal airspace opacities, at least moderately progressed compared to 01/10/2024.  Findings are concerning for multifocal aspiration/pneumonia.  Small right and small to moderate left pleural effusions.  Background of pulmonary fibrosis     Dictated by (CST): Jessica Starkey MD on 2/12/2024 at 8:34 PM     Finalized by (CST): Jessica Starkey MD on 2/12/2024 at 8:36 PM           ED Medications Administered:   Medications   ipratropium (Atrovent) 0.02 % nebulizer solution 1 mg (1 mg Nebulization New Bag 2/12/24 1944)   cefTRIAXone (Rocephin) 2 g in D5W 100 mL IVPB-ADD (has no administration in time range)   albuterol (Ventolin) (5 MG/ML) 0.5% nebulizer solution 10 mg (10 mg Nebulization Given 2/12/24 1929)   methylPREDNISolone sodium succinate (Solu-MEDROL) injection 125 mg (125 mg Intravenous Given 2/12/24 1955)   doxycycline (Vibramycin) cap 100 mg (100 mg Oral Given 2/12/24 1955)       Vitals:    02/12/24 1910 02/12/24 1913 02/12/24 1930   BP: 157/69  149/66   Pulse: 115  106   Resp: (!) 28  (!) 27   Temp: 98 °F (36.7 °C)     SpO2: (!) 87% 95% 97%     Disposition and Plan     Clinical Impression:  1. Acute hypoxic respiratory failure (HCC)    2. Sarcoidosis    3. Reactive airway disease with acute exacerbation, unspecified asthma severity, unspecified whether persistent      Disposition:  Admit  History and Physical     HPI:   68 year old with hx of htn, sarcoidosis, reactive airway disease who presents for further evaluation of progressive shortness of breath, cough that began 2 weeks ago.  Was using her home inhalers with no improvement.      In the emergency room blood pressure elevated, pulse 101.  Acute hypoxic respiratory failure noted with a SpO2 of 87% on room air.  Placed on 2 L nasal cannula.  Henry analysis overall nonrevealing.  Chest x-ray revealed signs of bilateral multifocal airspace  opacities progressed compared to x-ray done on January 10, 2024 concern for multifocal aspiration/pneumonia.  He was started on ceftriaxone and doxycycline.  Administered 125 mg methylprednisolone.  DuoNebs started.  Admitted for further treatment and evaluation.  Pulmonary consulted.    Constitutional:  Positive for fatigue and fever.   HENT:  Positive for congestion.    Eyes: Negative.    Respiratory:  Positive for cough, shortness of breath and wheezing.    Lab Results   Component Value Date     WBC 10.9 02/12/2024     HGB 11.7 (L) 02/12/2024     HCT 38.2 02/12/2024     .0 02/12/2024     CREATSERUM 0.89 02/12/2024     BUN 13 02/12/2024      02/12/2024     K 3.6 02/12/2024      02/12/2024     CO2 28.0 02/12/2024      (H) 02/12/2024     CA 8.9 02/12/2024     ALB 3.9 02/12/2024     ALKPHO 105 02/12/2024     BILT 0.2 02/12/2024     TP 7.5 02/12/2024     AST 16 02/12/2024     ALT 12 02/12/2024     TSH 1.790 05/12/2020     TROPHS 4 02/12/2024      Assessment/Plan:      Acute hypoxic respiratory failure (HCC)  -Chest x-ray findings concerning for multifocal pneumonia.  Administered ceftriaxone and doxycycline.  -Methylprednisolone administered.  Evaluate in AM, if able convert to oral steroids.     -Pulmonary consulted await further recs.  -Wean oxygen as tolerated.  -Respiratory therapy ordered.     Sarcoidosis  Reactive airway disease with acute exacerbation, unspecified asthma severity, unspecified whether persistent  Inhalers include albuterol   Albuterol PRN.  Follow with pulmonary.       HTN  -Include hydrochlorothiazide and lisinopril.  Monitor BP closely in the setting of pneumonia.      MEDICATIONS ADMINISTERED IN LAST 1 DAY:  albuterol (Ventolin) (2.5 MG/3ML) 0.083% nebulizer solution 2.5 mg       Date Action Dose Route User    2/13/2024 1140 Given 2.5 mg Nebulization Dasha Salcedo, RCP          albuterol (Ventolin) (5 MG/ML) 0.5% nebulizer solution 10 mg       Date Action Dose Route  User    2/12/2024 1929 Given 10 mg Nebulization Alex Rodriguez RCP          cefTRIAXone (Rocephin) 2 g in D5W 100 mL IVPB-ADD       Date Action Dose Route User    2/12/2024 2128 New Bag 2 g Intravenous Mary Bowser RN          doxycycline (Vibramycin) cap 100 mg       Date Action Dose Route User    2/12/2024 1955 Given 100 mg Oral Johana Jones RN          doxycycline (Vibramycin) cap 100 mg       Date Action Dose Route User    2/13/2024 0853 Given 100 mg Oral Anastasia Valles RN          ipratropium (Atrovent) 0.02 % nebulizer solution 1 mg       Date Action Dose Route User    2/12/2024 1944 New Bag 1 mg Nebulization Alex Rodriguez RCP          lisinopril (Prinivil; Zestril) tab 20 mg       Date Action Dose Route User    2/13/2024 0853 Given 20 mg Oral Anastasia Valles RN          methylPREDNISolone sodium succinate (Solu-MEDROL) injection 125 mg       Date Action Dose Route User    2/12/2024 1955 Given 125 mg Intravenous Johana Jones RN          methylPREDNISolone sodium succinate (Solu-MEDROL) injection 40 mg       Date Action Dose Route User    2/13/2024 1211 Given 40 mg Intravenous Anastasia Valles RN          sodium chloride 0.9% infusion       Date Action Dose Route User    2/13/2024 0853 New Bag (none) Intravenous Anastasia Valles RN    2/12/2024 2345 New Bag (none) Intravenous Margie Ashraf RN            Vitals (last day)       Date/Time Temp Pulse Resp BP SpO2 Weight O2 Device O2 Flow Rate (L/min) [facility]    02/13/24 0851 97.9 °F (36.6 °C) 94 18 122/94 96 % -- None (Room air) -- TR    02/12/24 2209 98.7 °F (37.1 °C) 101 20 166/70 97 % -- Nasal cannula 2 L/min CG    02/12/24 1930 -- 106 27 149/66 97 % -- Nasal cannula 2 L/min TP    02/12/24 1910 98 °F (36.7 °C) 115 28 157/69 87 % -- None (Room air) -- AN

## 2024-02-13 NOTE — PROGRESS NOTES
[facility]  Hospitalist Progress Note     Freya Martinez Patient Status:  Inpatient    1955  68 year old CSN [id number]   Location 573/573-A Attending Rafael Power MD   Hosp Day # 1 PCP No primary care provider on file.     Assessment & Plan:   ----------------------------------  Interstitial lung disease, with exacerbation.  Believed to be secondary to pulmonary sarcoidosis.  Less suspicion for pulmonary infection given lack of fever or leukocytosis.  Chest x-ray difficult to evaluate however.  -IV steroids with prolonged outpatient taper  -High-resolution CT  -Outpatient PFTs  -Pulmonology consult appreciated  -Continue antibiotics for now, DC soon    Acute asthma exacerbation.  See above    Obstructive sleep apnea.  CPAP    Other problems  Morbid obesity BMI 42  Hypertension  Sarcoidosis    dvt prophylaxis: sc heparin  code status: full code  dispo: home upon medical clearance    I personally reviewed the available laboratories, imaging including chest x-ray. I discussed/will discuss the case with pulmonology. I ordered laboratories, studies including CBC, BMP. I adjusted medications including doxycycline. Medical decision making high, risk is high.    Subjective:   ----------------------------------  Breathing is a little bit better today, not back to baseline.  Still coughing.  Short of breath with minimal activity.  No chest pain.      Objective:   Chief Complaint:   Chief Complaint   Patient presents with    Difficulty Breathing     ----------------------------------  Temp:  [97.9 °F (36.6 °C)-98.7 °F (37.1 °C)] 97.9 °F (36.6 °C)  Pulse:  [] 94  Resp:  [18-32] 18  BP: (121-166)/(58-94) 122/94  SpO2:  [87 %-100 %] 96 %  Gen: A+Ox3.  No distress.   HEENT: NCAT, neck supple, no carotid bruit.  CV: RRR, S1S2, and intact distal pulses. No gallop, rub, murmur.  Pulm: Coarse breath sounds throughout, extensive wheezing in all lung fields.  No rhonchi  Abd: Soft, NTND, BS normal, no  mass, no HSM, no rebound/guarding.   Neuro: Normal reflexes, CN. Sensory/motor exams grossly normal deficit.   MS: No joint effusions.  No peripheral edema.  Skin: Skin is warm and dry. No rashes, erythema, diaphoresis.   Psych: Normal mood and affect. Calm, cooperative    Labs:  Lab Results   Component Value Date    HGB 11.6 (L) 02/13/2024    WBC 6.8 02/13/2024    .0 02/13/2024     (L) 02/13/2024    K 4.1 02/13/2024    CREATSERUM 0.82 02/13/2024    VHCO3 29.5 (H) 02/12/2024    AST 13 02/13/2024    ALT 12 02/13/2024          methylPREDNISolone  40 mg Intravenous Q12H    fluticasone furoate-vilanterol  1 puff Inhalation Daily    albuterol  2.5 mg Nebulization BID    heparin  7,500 Units Subcutaneous 2 times per day    lisinopril  20 mg Oral Daily    pantoprazole  40 mg Oral QAM AC    cefTRIAXone  2 g Intravenous Q24H    doxycycline  100 mg Oral 2 times per day     acetaminophen, albuterol, ipratropium-albuterol

## 2024-02-13 NOTE — CONSULTS
[facility]    Report of Consultation    Freya Martinez Patient Status:  Inpatient    1955 MRN [id number]   Location [facility] 5SW/SE Attending Rafael Power MD   Hosp Day # 1 PCP No primary care provider on file.     Date of Admission:  2024  Date of Consult: 2024    Reason for Consultation:   Consults  Respiratory failure    History provided by:patient  HPI:     Chief Complaint   Patient presents with    Difficulty Breathing     HPI    This is a very pleasant 68-year-old female with history of pulmonary sarcoidosis diagnosed in  by bronchoscopy and biopsy at [place], asthma, ELPIDIO on CPAP  Patient presented with worsening dyspnea and dry cough with lingering symptoms in the last 5 to 6-month and she was found with low O2 sat and required 2 L of oxygen  Today she is up to the chair on room air  She received several courses of steroid and antibiotics for the last few months  Steroid helps as she claimed and symptoms worsen when she stopped steroid  Denies fever or chills  Denied weight loss or night sweats  Denied chest pain or orthopnea or PND  No lower extremity edema or pain or calf tenderness  No skin rashes or joint pain  Compliant with CPAP  Denies TB or exposure          History     Past Medical History:   Diagnosis Date    Hypertension      No past surgical history on file.  No family history on file.  Social History:  Social History     Socioeconomic History    Marital status:    Tobacco Use    Smoking status: Former    Smokeless tobacco: Former   Substance and Sexual Activity    Alcohol use: Not Currently    Drug use: Never     Social Determinants of Health     Food Insecurity: No Food Insecurity (2024)    Food Insecurity     Food Insecurity: Never true   Transportation Needs: No Transportation Needs (2024)    Transportation Needs     Lack of Transportation: No   Housing Stability: Low Risk  (2024)    Housing Stability     Housing Instability:  No     Allergies/Medications:   Allergies: No Known Allergies  Medications Prior to Admission   Medication Sig    hydroCHLOROthiazide 25 MG Oral Tab Take 1 tablet (25 mg total) by mouth daily.    pantoprazole 40 MG Oral Tab EC Take 1 tablet (40 mg total) by mouth every morning before breakfast.    montelukast 10 MG Oral Tab Take 1 tablet (10 mg total) by mouth once as needed (allergies).    ipratropium-albuterol 0.5-2.5 (3) MG/3ML Inhalation Solution Take 3 mL by nebulization every 4 (four) hours as needed (for SOB/wheezing).    Albuterol Sulfate  (90 Base) MCG/ACT Inhalation Aero Soln INHALE TWO PUFFS PO QID PRN    lisinopril 20 MG Oral Tab Take 1 tablet (20 mg total) by mouth daily.    HYDROcodone-acetaminophen 7.5-325 MG Oral Tab TK 1 T PO  Q 6 H PRN P       Review of Systems:     Constitutional:  Negative for chills and fever.   HENT: Negative.     Respiratory:  Positive for cough and shortness of breath. Negative for apnea, choking, chest tightness and wheezing.    Cardiovascular: Negative.    Gastrointestinal: Negative.    Musculoskeletal: Negative.    Skin: Negative.    Neurological: Negative.    Hematological: Negative.    Psychiatric/Behavioral: Negative.         Physical Exam:   Vital Signs:   height is 5' 7.01\" (1.702 m) and weight is 274 lb 4.8 oz (124.4 kg). Her oral temperature is 97.9 °F (36.6 °C). Her blood pressure is 122/94 (abnormal) and her pulse is 94. Her respiration is 18 and oxygen saturation is 96%.   Physical Exam  Constitutional:       General: She is not in acute distress.     Appearance: She is obese. She is not ill-appearing.   HENT:      Head: Normocephalic and atraumatic.      Nose: Nose normal.      Mouth/Throat:      Mouth: Mucous membranes are moist.   Eyes:      General: No scleral icterus.     Pupils: Pupils are equal, round, and reactive to light.   Cardiovascular:      Rate and Rhythm: Normal rate.      Heart sounds: No murmur heard.     No gallop.   Pulmonary:       Effort: No respiratory distress.      Breath sounds: No stridor. Wheezing and rales present. No rhonchi.   Chest:      Chest wall: No tenderness.   Abdominal:      General: Abdomen is flat. Bowel sounds are normal.      Palpations: Abdomen is soft.      Tenderness: There is no guarding or rebound.   Musculoskeletal:      Cervical back: Normal range of motion. No rigidity.      Right lower leg: No edema.      Left lower leg: No edema.   Skin:     General: Skin is dry.   Neurological:      General: No focal deficit present.      Mental Status: She is oriented to person, place, and time.         Results:     Lab Results   Component Value Date    WBC 6.8 02/13/2024    HGB 11.6 (L) 02/13/2024    HCT 37.9 02/13/2024    .0 02/13/2024    CREATSERUM 0.82 02/13/2024    BUN 12 02/13/2024     (L) 02/13/2024    K 4.1 02/13/2024     02/13/2024    CO2 28.0 02/13/2024     (H) 02/13/2024    CA 9.0 02/13/2024    ALB 3.9 02/13/2024    ALKPHO 97 02/13/2024    BILT 0.3 02/13/2024    TP 7.8 02/13/2024    AST 13 02/13/2024    ALT 12 02/13/2024    TSH 1.790 05/12/2020    TROPHS 4 02/12/2024     XR CHEST AP PORTABLE  (CPT=71045)    Result Date: 2/12/2024  CONCLUSION:   Extensive bilateral multifocal airspace opacities, at least moderately progressed compared to 01/10/2024.  Findings are concerning for multifocal aspiration/pneumonia.  Small right and small to moderate left pleural effusions.  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    Plan :  Check CRP, ESR, LDH, ACE level, ANCA  Check procalcitonin  HRCT  Outpatient PFTs  Continue with the steroid /will need longer course and slow taper and close follow-up      2-asthma with acute exacerbation  ICS/LAMA with the Breo  Nebulizers  Steroid    3-ELPIDIO  CPAP nightly    4-DVT prophylaxis  Heparin subcu                  Diann Mckeon MD  2/13/2024

## 2024-02-13 NOTE — ED INITIAL ASSESSMENT (HPI)
Pt c/o of SOB for couple a days but states today her SOB is worst and she gets SOB just talking.  Denies SOB. Pt also c/o of cough. Denies fevers. Pt 87%on RA in triage. Hx of asthma. Pt placed on 2L of oxygen in triage

## 2024-02-13 NOTE — PLAN OF CARE
Pt admitted from ED On 2L NC, no home O2, CPAP on at night. Deneis pain. PRN nebs available. Pt oriented to the unit. Safety precautions in place, call light within reach.     Problem: Patient Centered Care  Goal: Patient preferences are identified and integrated in the patient's plan of care  Description: Interventions:  - What would you like us to know as we care for you? From home with family  - Provide timely, complete, and accurate information to patient/family  - Incorporate patient and family knowledge, values, beliefs, and cultural backgrounds into the planning and delivery of care  - Encourage patient/family to participate in care and decision-making at the level they choose  - Honor patient and family perspectives and choices  Outcome: Progressing     Problem: Patient/Family Goals  Goal: Patient/Family Long Term Goal  Description: Patient's Long Term Goal: to go home    Interventions:  - Monitor vital signs  - Monitor appropriate labs  - Administer medications per order  - Pain management as needed  - Follow MD orders  - Diagnostics per orders  - Update / inform patient and family on plan of care  - Discharge planning      - See additional Care Plan goals for specific interventions  Outcome: Progressing  Goal: Patient/Family Short Term Goal  Description: Patient's Short Term Goal: to breathe better    Interventions:   - PRN neb tx  -follow MD orders  - See additional Care Plan goals for specific interventions  Outcome: Progressing     Problem: RESPIRATORY - ADULT  Goal: Achieves optimal ventilation and oxygenation  Description: INTERVENTIONS:  - Assess for changes in respiratory status  - Assess for changes in mentation and behavior  - Position to facilitate oxygenation and minimize respiratory effort  - Oxygen supplementation based on oxygen saturation or ABGs  - Provide Smoking Cessation handout, if applicable  - Encourage broncho-pulmonary hygiene including cough, deep breathe, Incentive Spirometry  -  Assess the need for suctioning and perform as needed  - Assess and instruct to report SOB or any respiratory difficulty  - Respiratory Therapy support as indicated  - Manage/alleviate anxiety  - Monitor for signs/symptoms of CO2 retention  Outcome: Progressing     Problem: DISCHARGE PLANNING  Goal: Discharge to home or other facility with appropriate resources  Description: INTERVENTIONS:  - Identify barriers to discharge w/pt and caregiver  - Include patient/family/discharge partner in discharge planning  - Arrange for needed discharge resources and transportation as appropriate  - Identify discharge learning needs (meds, wound care, etc)  - Arrange for interpreters to assist at discharge as needed  - Consider post-discharge preferences of patient/family/discharge partner  - Complete POLST form as appropriate  - Assess patient's ability to be responsible for managing their own health  - Refer to Case Management Department for coordinating discharge planning if the patient needs post-hospital services based on physician/LIP order or complex needs related to functional status, cognitive ability or social support system  Outcome: Progressing

## 2024-02-13 NOTE — ED QUICK NOTES
Orders for admission, patient is aware of plan and ready to go upstairs. Any questions, please call ED RN Leslie at extension [phone].     Patient Covid vaccination status: Fully vaccinated     COVID Test Ordered in ED: SARS-CoV-2/Flu A and B/RSV by PCR (GeneXpert)    COVID Suspicion at Admission: N/A    Running Infusions:     Mental Status/LOC at time of transport: ALERT    Other pertinent information: On 2L via nasal cannula, room air at baseline, 20G IV L AC, ambulates independently    CIWA score: N/A   NIH score:  N/A

## 2024-02-14 VITALS
OXYGEN SATURATION: 95 % | TEMPERATURE: 98 F | HEIGHT: 67.01 IN | BODY MASS INDEX: 43.05 KG/M2 | RESPIRATION RATE: 20 BRPM | HEART RATE: 99 BPM | WEIGHT: 274.31 LBS | SYSTOLIC BLOOD PRESSURE: 148 MMHG | DIASTOLIC BLOOD PRESSURE: 96 MMHG

## 2024-02-14 LAB
ACE: <15 U/L
ANION GAP SERPL CALC-SCNC: 1 MMOL/L (ref 0–18)
ANTI-MPO ANTIBODIES: <0.2 UNITS
ANTI-PR3 ANTIBODIES: <0.2 UNITS
BASOPHILS # BLD AUTO: 0 X10(3) UL (ref 0–0.2)
BASOPHILS NFR BLD AUTO: 0 %
BUN BLD-MCNC: 19 MG/DL (ref 9–23)
BUN/CREAT SERPL: 25 (ref 10–20)
CALCIUM BLD-MCNC: 8.7 MG/DL (ref 8.7–10.4)
CHLORIDE SERPL-SCNC: 109 MMOL/L (ref 98–112)
CO2 SERPL-SCNC: 27 MMOL/L (ref 21–32)
CREAT BLD-MCNC: 0.76 MG/DL
DEPRECATED RDW RBC AUTO: 45.4 FL (ref 35.1–46.3)
EGFRCR SERPLBLD CKD-EPI 2021: 86 ML/MIN/1.73M2 (ref 60–?)
EOSINOPHIL # BLD AUTO: 0 X10(3) UL (ref 0–0.7)
EOSINOPHIL NFR BLD AUTO: 0 %
ERYTHROCYTE [DISTWIDTH] IN BLOOD BY AUTOMATED COUNT: 15.9 % (ref 11–15)
GLUCOSE BLD-MCNC: 107 MG/DL (ref 70–99)
HCT VFR BLD AUTO: 35.5 %
HGB BLD-MCNC: 11.2 G/DL
IMM GRANULOCYTES # BLD AUTO: 0.07 X10(3) UL (ref 0–1)
IMM GRANULOCYTES NFR BLD: 0.7 %
LYMPHOCYTES # BLD AUTO: 1.09 X10(3) UL (ref 1–4)
LYMPHOCYTES NFR BLD AUTO: 10.6 %
MCH RBC QN AUTO: 24.8 PG (ref 26–34)
MCHC RBC AUTO-ENTMCNC: 31.5 G/DL (ref 31–37)
MCV RBC AUTO: 78.5 FL
MONOCYTES # BLD AUTO: 0.3 X10(3) UL (ref 0.1–1)
MONOCYTES NFR BLD AUTO: 2.9 %
NEUTROPHILS # BLD AUTO: 8.8 X10 (3) UL (ref 1.5–7.7)
NEUTROPHILS # BLD AUTO: 8.8 X10(3) UL (ref 1.5–7.7)
NEUTROPHILS NFR BLD AUTO: 85.8 %
OSMOLALITY SERPL CALC.SUM OF ELEC: 287 MOSM/KG (ref 275–295)
PLATELET # BLD AUTO: 299 10(3)UL (ref 150–450)
POTASSIUM SERPL-SCNC: 4.5 MMOL/L (ref 3.5–5.1)
RBC # BLD AUTO: 4.52 X10(6)UL
SODIUM SERPL-SCNC: 137 MMOL/L (ref 136–145)
WBC # BLD AUTO: 10.3 X10(3) UL (ref 4–11)

## 2024-02-14 PROCEDURE — 99233 SBSQ HOSP IP/OBS HIGH 50: CPT | Performed by: INTERNAL MEDICINE

## 2024-02-14 RX ORDER — DOXYCYCLINE HYCLATE 100 MG/1
100 CAPSULE ORAL 2 TIMES DAILY
Qty: 8 CAPSULE | Refills: 0 | Status: ON HOLD | OUTPATIENT
Start: 2024-02-14 | End: 2024-02-26

## 2024-02-14 RX ORDER — FLUTICASONE FUROATE AND VILANTEROL 200; 25 UG/1; UG/1
1 POWDER RESPIRATORY (INHALATION) DAILY
Qty: 60 EACH | Refills: 0 | Status: SHIPPED | OUTPATIENT
Start: 2024-02-15 | End: 2024-02-21

## 2024-02-14 RX ORDER — PREDNISONE 20 MG/1
60 TABLET ORAL
Status: DISCONTINUED | OUTPATIENT
Start: 2024-02-14 | End: 2024-02-14

## 2024-02-14 RX ORDER — PREDNISONE 20 MG/1
TABLET ORAL
Qty: 70 TABLET | Refills: 0 | Status: ON HOLD | OUTPATIENT
Start: 2024-02-15 | End: 2024-02-29

## 2024-02-14 RX ORDER — ALBUTEROL SULFATE 2.5 MG/3ML
2.5 SOLUTION RESPIRATORY (INHALATION)
Status: DISCONTINUED | OUTPATIENT
Start: 2024-02-14 | End: 2024-02-14

## 2024-02-14 NOTE — PLAN OF CARE
Walk test done. Oxygen saturations above 90% with ambulating in hallway on room air. IV taken out. Discharge instructions discussed. Pt verbalized understanding of new medications and follow up with pulmonary team in 1-2 weeks.     Problem: Patient Centered Care  Goal: Patient preferences are identified and integrated in the patient's plan of care  Description: Interventions:  - What would you like us to know as we care for you? From home with family  - Provide timely, complete, and accurate information to patient/family  - Incorporate patient and family knowledge, values, beliefs, and cultural backgrounds into the planning and delivery of care  - Encourage patient/family to participate in care and decision-making at the level they choose  - Honor patient and family perspectives and choices  Outcome: Completed     Problem: Patient/Family Goals  Goal: Patient/Family Long Term Goal  Description: Patient's Long Term Goal: to go home    Interventions:  - Monitor vital signs  - Monitor appropriate labs  - Administer medications per order  - Pain management as needed  - Follow MD orders  - Diagnostics per orders  - Update / inform patient and family on plan of care  - Discharge planning      - See additional Care Plan goals for specific interventions  Outcome: Completed  Goal: Patient/Family Short Term Goal  Description: Patient's Short Term Goal: to breathe better    Interventions:   - PRN neb tx  -follow MD orders  - See additional Care Plan goals for specific interventions  Outcome: Completed     Problem: RESPIRATORY - ADULT  Goal: Achieves optimal ventilation and oxygenation  Description: INTERVENTIONS:  - Assess for changes in respiratory status  - Assess for changes in mentation and behavior  - Position to facilitate oxygenation and minimize respiratory effort  - Oxygen supplementation based on oxygen saturation or ABGs  - Provide Smoking Cessation handout, if applicable  - Encourage broncho-pulmonary hygiene  including cough, deep breathe, Incentive Spirometry  - Assess the need for suctioning and perform as needed  - Assess and instruct to report SOB or any respiratory difficulty  - Respiratory Therapy support as indicated  - Manage/alleviate anxiety  - Monitor for signs/symptoms of CO2 retention  Outcome: Completed     Problem: DISCHARGE PLANNING  Goal: Discharge to home or other facility with appropriate resources  Description: INTERVENTIONS:  - Identify barriers to discharge w/pt and caregiver  - Include patient/family/discharge partner in discharge planning  - Arrange for needed discharge resources and transportation as appropriate  - Identify discharge learning needs (meds, wound care, etc)  - Arrange for interpreters to assist at discharge as needed  - Consider post-discharge preferences of patient/family/discharge partner  - Complete POLST form as appropriate  - Assess patient's ability to be responsible for managing their own health  - Refer to Case Management Department for coordinating discharge planning if the patient needs post-hospital services based on physician/LIP order or complex needs related to functional status, cognitive ability or social support system  Outcome: Completed

## 2024-02-14 NOTE — PROGRESS NOTES
[facility]    Progress Note    Freya Martinez Patient Status:  Inpatient    1955 MRN [id number]   Location [facility] 5SW/SE Attending Yvette Verdugo MD   Hosp Day # 2 PCP No primary care provider on file.         Subjective:   Subjective:  Seen and examined  Up to chair and comfortable on room air   Mild cough with white sputum   No f/c   Less dyspnea and feels better      Objective:   Blood pressure (!) 148/96, pulse 99, temperature 97.8 °F (36.6 °C), temperature source Oral, resp. rate 20, height 5' 7.01\" (1.702 m), weight 274 lb 4.8 oz (124.4 kg), SpO2 95%, not currently breastfeeding.  Physical Exam  Constitutional:       General: She is not in acute distress.     Appearance: She is obese.   HENT:      Head: Normocephalic and atraumatic.      Nose: Nose normal.      Mouth/Throat:      Mouth: Mucous membranes are moist.   Eyes:      General: No scleral icterus.  Cardiovascular:      Heart sounds:      No gallop.   Pulmonary:      Breath sounds: Wheezing and rales present.      Comments: Good air exchange to both lungs   Bilateral rales and mild whezes   Abdominal:      General: Abdomen is flat. Bowel sounds are normal.      Palpations: Abdomen is soft.      Tenderness: There is no guarding.   Musculoskeletal:      Cervical back: Normal range of motion. Rigidity present.      Right lower leg: No edema.      Left lower leg: No edema.   Skin:     General: Skin is dry.   Neurological:      General: No focal deficit present.      Mental Status: She is oriented to person, place, and time.         Results:   Lab Results   Component Value Date    WBC 10.3 2024    HGB 11.2 (L) 2024    HCT 35.5 2024    .0 2024    CREATSERUM 0.76 2024    BUN 19 2024     2024    K 4.5 2024     2024    CO2 27.0 2024     (H) 2024    CA 8.7 2024    ALB 3.9 2024    ALKPHO 97 2024    BILT 0.3  02/13/2024    TP 7.8 02/13/2024    AST 13 02/13/2024    ALT 12 02/13/2024    TSH 1.790 05/12/2020    ESRML 62 (H) 02/13/2024    CRP 4.30 (H) 02/13/2024    TROPHS 4 02/12/2024       CT CHEST HI RESOLUTION (CPT=71250)    Result Date: 2/13/2024  CONCLUSION:  1. HRCT demonstrates extensive bilateral multifocal airspace opacities, predominantly peribronchial in distribution.  Finding is thought to relate to underlying sarcoidosis.  Areas of multifocal more confluent opacity, including at the left lung base may  be secondary to the same process.  Superimposed pneumonia or developing chronic organizing pneumonia are differential considerations.  Attention on follow-up CT chest in 3 to 6 months.   2. Exam also demonstrates 8 millimeter nodule in the right lower lobe, thought to relate to underlying sarcoidosis, with other etiologies within the differential at this time..  Recommend attention on follow-up CT chest.  3. Small to moderate left pleural effusion.  Nonspecific , but can also be secondary to sarcoidosis.  Dictated by (CST): Jessica Starkey MD on 2/13/2024 at 4:04 PM     Finalized by (CST): Jessica Starkey MD on 2/13/2024 at 4:15 PM          XR CHEST AP PORTABLE  (CPT=71045)    Result Date: 2/12/2024  CONCLUSION:   Extensive bilateral multifocal airspace opacities, at least moderately progressed compared to 01/10/2024.  Findings are concerning for multifocal aspiration/pneumonia.  Small right and small to moderate left pleural effusions.  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  Normal procalcitonin   Elevated ESR     Will need prolonged course of steroid    Prednisone 60 mg x 7 days 'then 50 mg x 7 days  Then 40 mg x 7 days   Then 30 mg x 7 days   Then slow taper / with close out pt follow up     Outpatient PFTs        2-asthma with acute exacerbation  ICS/LAMA with the Breo  Nebulizers  Steroid     3-ELPIDIO  CPAP nightly     4-DVT prophylaxis  Heparin subcu    Home O2 eval   Ok to discharge   F/u with me in pulmonary clinic in 1-2 weeks   D/w pt and staff   D/w Dr Lucina Mckeon MD  2/14/2024

## 2024-02-15 ENCOUNTER — PATIENT OUTREACH (OUTPATIENT)
Dept: CASE MANAGEMENT | Age: 69
End: 2024-02-15

## 2024-02-15 RX ORDER — DICLOFENAC EPOLAMINE 0.01 G/1
1 SYSTEM TOPICAL EVERY 12 HOURS
COMMUNITY
Start: 2024-02-10

## 2024-02-15 NOTE — PROGRESS NOTES
Attempted to reach the patient to complete a [facility]-Hospital FU call. Left a message for the pt to call the [facility] back at, [phone].

## 2024-02-15 NOTE — PROGRESS NOTES
Initial Post Discharge Follow Up   Discharge Date: 2/14/24  Contact Date: 2/15/2024    Consent Verification:  Assessment Completed With: Patient  HIPAA Verified?  Yes    Discharge Dx:   Interstitial lung disease, with exacerbation  Acute asthma exacerbation.   Obstructive sleep apnea.   Other problems  Morbid obesity BMI 42  Hypertension  Sarcoidosis    General:   How have you been since your discharge from the hospital? The patient reports doing well at home. The patient's SOB and productive cough has improved. The patient did report wheezing has continued. The patient denies any chest pain, hemoptysis, weakness, dizziness, confusion or fever. The patient has not checked BP since discharge.   Do you have any pain since discharge?  No    How well was your pain managed while in the hospital? N/A  When you were leaving the hospital were your discharge instructions reviewed with you? Yes  How well were your discharge instructions explained to you?   On a scale of 1-5   1- Very Poor and 5- Very well   Very Well  Do you have any questions about your discharge instructions?  No  Before leaving the hospital was your diagnoses explained to you? Yes  Do you have any questions about your diagnoses? No  Are you able to perform normal daily activities of living as you have prior to your hospital stay (dressing, bathing, ambulating to the bathroom, etc)? yes  (NCM) Was patient given a different diet per AVS? no      Medications:   Current Outpatient Medications   Medication Sig Dispense Refill    doxycycline 100 MG Oral Cap Take 1 capsule (100 mg total) by mouth 2 (two) times daily for 4 days. 8 capsule 0    fluticasone furoate-vilanterol 200-25 MCG/ACT Inhalation Aerosol Powder, Breath Activated Inhale 1 puff into the lungs daily. 60 each 0    predniSONE 20 MG Oral Tab Take 3 tablets (60 mg total) by mouth daily with breakfast for 7 days, THEN 2.5 tablets (50 mg total) daily with breakfast for 7 days. THEN 2 tablets (40 mg  total) daily for 7 days, THEN 1.5 tablets (30 mg total) for 7 days, THEN continue with 1 tablet of 20 mg until advised otherwise by your pulmonologist. 70 tablet 0    hydroCHLOROthiazide 25 MG Oral Tab Take 1 tablet (25 mg total) by mouth daily.      pantoprazole 40 MG Oral Tab EC Take 1 tablet (40 mg total) by mouth every morning before breakfast.      montelukast 10 MG Oral Tab Take 1 tablet (10 mg total) by mouth once as needed (allergies).      ipratropium-albuterol 0.5-2.5 (3) MG/3ML Inhalation Solution Take 3 mL by nebulization every 4 (four) hours as needed (for SOB/wheezing).      Albuterol Sulfate  (90 Base) MCG/ACT Inhalation Aero Soln INHALE TWO PUFFS PO QID PRN      HYDROcodone-acetaminophen 7.5-325 MG Oral Tab TK 1 T PO  Q 6 H PRN P      lisinopril 20 MG Oral Tab Take 1 tablet (20 mg total) by mouth daily.       Were there any changes to your current medication(s) noted on the AVS? Yes  If so, were these medication changes discussed with you prior to leaving the hospital? Yes  If a new medication was prescribed:    Was the new medication's purpose & side effects reviewed? Yes  Do you have any questions about your new medication? No NCM did stress the importance of completing the antibiotic medication as prescribed regardless of symptom improvement.     Did you  your discharge medications when you left the hospital? Yes  Let's go over your medications together to make sure we are not missing anything. Medications Reviewed  Are there any reasons that keep you from taking your medication as prescribed? No  Are you having any concerns with constipation? No  Did patient receive their flu shot (Sept-March)? No    Discharge medications reviewed/discussed/and reconciled against outpatient medications with patient.  Any changes or updates to medications sent to PCP.  Patient Acknowledged     Referrals/orders at D/C:  Referrals/orders placed at D/C? no    Except for Home Health Services mentioned  above, have you scheduled these other services? No    DME ordered at D/C? No    The patient has a nebulizer and CPAP at home.     Discharge orders, AVS reviewed and discussed with patient. Any changes or updates to orders sent to PCP.  Patient Acknowledged      SDOH:   Transportation:    Transportation Needs: No Transportation Needs (2/15/2024)    Transportation Needs     Lack of Transportation: No       Financial Strain:   Financial Resource Strain: Low Risk  (2/15/2024)    Financial Resource Strain     Difficulty of Paying Living Expenses: Not hard at all     Med Affordability: No     Follow up appointments:      Your appointments       Date & Time Appointment Department (Center)    Feb 21, 2024  1:15 PM CST Follow Up Visit with Diann Mckeon MD [facility] ([facility])              [facility], [facility]  [address]  [place] [id number] [phone]            TCC  Was TCC ordered: No      PCP (If no TCC appointment)  Does patient already have a PCP appointment scheduled? The patient confirmed being established with the following PCP:     Mindy Mejia M.D., S.C.  [address] [place] [id number] [phone]      Specialist    Does the patient have any other follow up appointment(s) needing to be scheduled? Yes  If yes: NCM reviewed upcoming specialist appointment with patient: Yes  The patient is scheduled with Dr. Mckeon on 2/21/2024.     Is there any reason as to why you cannot make your appointment(s)?  No     Needs post D/C:   Now that you are home, are there any needs or concerns you need addressed before your next visit with your PCP?  (DME, meds, questions, etc.): No    Interventions by NCM:    Reviewed all discharge instructions with the patient with a focus on inhalers/prednisone/antibiotic therapy. All medications were reviewed and educated on the  importance of taking the medications as directed.  Patient symptoms were assessed, education was completed for signs/symptoms to monitor, and reviewed when to contact providers vs go to the ED/call 911. Appointments were reviewed and stressed the importance of a close follow up with providers.  The patient verbalized understanding and will contact the office with any further questions or concerns.     Overall Rating:   How would you rate the care you received while in the hospital? excellent    CCM referral placed:    Not Applicable    BOOK BY DATE: 2/28/2024

## 2024-02-21 ENCOUNTER — OFFICE VISIT (OUTPATIENT)
Dept: PULMONOLOGY | Facility: CLINIC | Age: 69
End: 2024-02-21

## 2024-02-21 VITALS
RESPIRATION RATE: 20 BRPM | DIASTOLIC BLOOD PRESSURE: 79 MMHG | OXYGEN SATURATION: 97 % | SYSTOLIC BLOOD PRESSURE: 137 MMHG | HEART RATE: 97 BPM | HEIGHT: 67 IN | WEIGHT: 279 LBS | BODY MASS INDEX: 43.79 KG/M2

## 2024-02-21 DIAGNOSIS — J84.9 ILD (INTERSTITIAL LUNG DISEASE) (HCC): Primary | ICD-10-CM

## 2024-02-21 PROCEDURE — 1111F DSCHRG MED/CURRENT MED MERGE: CPT | Performed by: INTERNAL MEDICINE

## 2024-02-21 PROCEDURE — 3008F BODY MASS INDEX DOCD: CPT | Performed by: INTERNAL MEDICINE

## 2024-02-21 PROCEDURE — 3078F DIAST BP <80 MM HG: CPT | Performed by: INTERNAL MEDICINE

## 2024-02-21 PROCEDURE — 99215 OFFICE O/P EST HI 40 MIN: CPT | Performed by: INTERNAL MEDICINE

## 2024-02-21 PROCEDURE — 3075F SYST BP GE 130 - 139MM HG: CPT | Performed by: INTERNAL MEDICINE

## 2024-02-21 PROCEDURE — 94761 N-INVAS EAR/PLS OXIMETRY MLT: CPT | Performed by: INTERNAL MEDICINE

## 2024-02-21 RX ORDER — PREDNISONE 10 MG/1
TABLET ORAL
Qty: 60 TABLET | Refills: 0 | Status: ON HOLD | OUTPATIENT
Start: 2024-02-21 | End: 2024-02-26

## 2024-02-21 RX ORDER — FUROSEMIDE 20 MG/1
20 TABLET ORAL EVERY OTHER DAY
Qty: 10 TABLET | Refills: 0 | Status: ON HOLD | OUTPATIENT
Start: 2024-02-21

## 2024-02-21 RX ORDER — FLUTICASONE FUROATE AND VILANTEROL 200; 25 UG/1; UG/1
1 POWDER RESPIRATORY (INHALATION) DAILY
Qty: 60 EACH | Refills: 0 | Status: ON HOLD | OUTPATIENT
Start: 2024-02-21

## 2024-02-21 NOTE — PROGRESS NOTES
Subjective:   Patient ID: Freya Martinez is a 68 year old female.    HPI  Dyspnea with minimal activity  Increased lower extremity pitting edema  Dry cough  Wheezes  No fever or chills  Generalized fatigue  Borderline compliance with CPAP  History/Other:   Review of Systems   Constitutional:  Positive for fatigue. Negative for fever.   HENT:  Negative for congestion.    Cardiovascular:  Positive for leg swelling. Negative for chest pain and palpitations.   Gastrointestinal: Negative.    Neurological: Negative.    Hematological: Negative.    Psychiatric/Behavioral: Negative.       Current Outpatient Medications   Medication Sig Dispense Refill    diclofenac 1.3 % External Patch Apply 1 patch topically Q12H.      fluticasone furoate-vilanterol 200-25 MCG/ACT Inhalation Aerosol Powder, Breath Activated Inhale 1 puff into the lungs daily. 60 each 0    predniSONE 20 MG Oral Tab Take 3 tablets (60 mg total) by mouth daily with breakfast for 7 days, THEN 2.5 tablets (50 mg total) daily with breakfast for 7 days. THEN 2 tablets (40 mg total) daily for 7 days, THEN 1.5 tablets (30 mg total) for 7 days, THEN continue with 1 tablet of 20 mg until advised otherwise by your pulmonologist. 70 tablet 0    hydroCHLOROthiazide 25 MG Oral Tab Take 1 tablet (25 mg total) by mouth daily.      pantoprazole 40 MG Oral Tab EC Take 1 tablet (40 mg total) by mouth every morning before breakfast. As needed      montelukast 10 MG Oral Tab Take 1 tablet (10 mg total) by mouth once as needed (allergies).      ipratropium-albuterol 0.5-2.5 (3) MG/3ML Inhalation Solution Take 3 mL by nebulization every 4 (four) hours as needed (for SOB/wheezing).      Albuterol Sulfate  (90 Base) MCG/ACT Inhalation Aero Soln INHALE TWO PUFFS PO QID PRN      HYDROcodone-acetaminophen 7.5-325 MG Oral Tab TK 1 T PO  Q 6 H PRN P      lisinopril 20 MG Oral Tab Take 1 tablet (20 mg total) by mouth daily.       Allergies:No Known Allergies    Objective:    Physical Exam  Constitutional:       General: She is not in acute distress.     Appearance: She is obese. She is ill-appearing.   Cardiovascular:      Rate and Rhythm: Normal rate.      Heart sounds: No murmur heard.     No gallop.   Pulmonary:      Effort: No respiratory distress.      Breath sounds: No stridor. Wheezing and rales present. No rhonchi.   Abdominal:      General: Abdomen is flat. Bowel sounds are normal. There is no distension.      Palpations: Abdomen is soft.   Musculoskeletal:      Cervical back: Neck supple. No rigidity.      Right lower leg: Edema present.      Left lower leg: Edema present.   Neurological:      General: No focal deficit present.      Mental Status: She is oriented to person, place, and time.         Assessment & Plan:   1. 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  Diagnosed with pulm sarcoidosis 1989 by bronchoscopy and biopsy at [place]  Recent hospitalization  HRCT consistent ILD/sarcoidosis   Worsening symptoms in the last 5 to 6 months  Normal procalcitonin   Elevated ESR     Still with shortness of breath and cough and wheezes  Currently on 50 mg prednisone  Cut down to 40 mg for 5 days  Then 30 mg for 1 week then 20 mg for 1 week then 10 mg for 4 weeks then 5 mg for 4 weeks then stop  If symptoms worsen to call me      2-asthma   ICS/LABA  Albuterol inhaler and nebulizers     3-ELPIDIO  CPAP nightly /claimed poor compliance  Download available  Encourage patient to use CPAP every night and all night    4-CHF and right heart failure  ACE inhibitor and hydrochlorothiazide  Pitting edema lower extremity  Add Lasix 20 mg every other day /to hold hydrochlorothiazide when she takes Lasix    5-chronic respiratory failure with hypoxia  Home O2 at 2 L nasal cannula    I have reviewed the oxygen testing performed on this date 2/21/24 and the patient will need home oxygen at 2 LPM continuously via nasal cannula due to hypoxia. This condition  is expected to improve with oxygen. The patient will need portable oxygen because they are mobile at home.         F/u in 4 weeks   40 min with pt today           Meds This Visit:  Requested Prescriptions      No prescriptions requested or ordered in this encounter       Imaging & Referrals:  None

## 2024-02-21 NOTE — PROGRESS NOTES
Order for oxygen, ambulatory oximetry results, chart notes and demographics faxed to [facility] with confirmation. Notified rep Jacobo of order.

## 2024-02-26 PROBLEM — J96.21 ACUTE ON CHRONIC RESPIRATORY FAILURE WITH HYPOXIA (HCC): Status: ACTIVE | Noted: 2024-02-26

## 2024-02-26 PROBLEM — J96.21 ACUTE ON CHRONIC RESPIRATORY FAILURE WITH HYPOXIA (HCC): Status: ACTIVE | Noted: 2024-01-01

## 2024-02-26 NOTE — PLAN OF CARE
Patient on 4L of oxygen via nasal cannula. Patient reports she is not on oxygen at home but it was in the process of being set up. Patient updated on POC and verbalized understanding. Report called to Ivon US on PMU at 1815. All questions answered.    Problem: Patient Centered Care  Goal: Patient preferences are identified and integrated in the patient's plan of care  Description: Interventions:  - What would you like us to know as we care for you? I live at home with my .  - Provide timely, complete, and accurate information to patient/family  - Incorporate patient and family knowledge, values, beliefs, and cultural backgrounds into the planning and delivery of care  - Encourage patient/family to participate in care and decision-making at the level they choose  - Honor patient and family perspectives and choices  Outcome: Progressing     Problem: Patient/Family Goals  Goal: Patient/Family Long Term Goal  Description: Patient's Long Term Goal: To go home    Interventions:  - Titrate oxygen down  - Medication compliance  - Follow provider recommendations  - See additional Care Plan goals for specific interventions  Outcome: Progressing  Goal: Patient/Family Short Term Goal  Description: Patient's Short Term Goal: To breathe easier    Interventions:   - Oxygen therapy  - IV solumedrol  - Nebulizer treatments  - Further testing  - Follow provider recommendations  - See additional Care Plan goals for specific interventions  Outcome: Progressing

## 2024-02-26 NOTE — ED INITIAL ASSESSMENT (HPI)
Pt in [facility] to rm 37 with sob, moderate respiratory distress, grunting with accessories musels, hx of asthma and Scardosis difficulty ambulating , difficult speaking in full sentences

## 2024-02-26 NOTE — ED PROVIDER NOTES
Patient Seen in: [facility]      History     Chief Complaint   Patient presents with    Asthma     Stated Complaint: SOB    Subjective:   HPI    68-year-old female with longstanding sarcoidosis/interstitial lung disease admitted several weeks ago with an exacerbation on a long tapering of steroids still on 40 mg daily with worsening shortness of breath since last night.  Saw pulmonary and discharge follow-up last week.  Plan was for likely home O2.  She is not on it currently.  No fever.  Here with her .  She states she feels like the steroids and even the nebs did not help much before I saw her they started an hour-long neb and she reportedly presented to triage with a saturation in the 50s.  New today is some pinkish sputum.  She had a CT of her chest on her last visit.    Objective:   Past Medical History:   Diagnosis Date    Ascariosis     Hypertension               History reviewed. No pertinent surgical history.             Social History     Socioeconomic History    Marital status:    Tobacco Use    Smoking status: Former    Smokeless tobacco: Former   Substance and Sexual Activity    Alcohol use: Not Currently    Drug use: Never     Social Determinants of Health     Financial Resource Strain: Low Risk  (2/15/2024)    Financial Resource Strain     Difficulty of Paying Living Expenses: Not hard at all     Med Affordability: No   Food Insecurity: No Food Insecurity (2/12/2024)    Food Insecurity     Food Insecurity: Never true   Transportation Needs: No Transportation Needs (2/15/2024)    Transportation Needs     Lack of Transportation: No   Housing Stability: Low Risk  (2/12/2024)    Housing Stability     Housing Instability: No              Review of Systems    Positive for stated complaint: SOB  Other systems are as noted in HPI.  Constitutional and vital signs reviewed.      All other systems reviewed and negative except as noted above.    Physical Exam     ED  Triage Vitals [02/26/24 1015]   /65   Pulse 108   Resp (!) 35   Temp    Temp src    SpO2 93 %   O2 Device Nasal cannula       Current:/65   Pulse 108   Resp (!) 35   LMP  (LMP Unknown)   SpO2 93%         Physical Exam    Constitutional: Oriented to person, place, and time.  Appears well-developed.  Obese, noted to be in moderate respiratory distress.  Head: Normocephalic and atraumatic.   Eyes: Conjunctivae are normal. Pupils are equal, round, and reactive to light.   Neck: Normal range of motion. Neck supple.   Cardiovascular: Mildly tachycardic, regular rhythm and intact and equal  distal pulses.    Pulmonary/Chest: moderate respiratory distress and tachypnea, diminished lung sounds wheeze and rhonchi bilaterally  Abdominal: Soft. There is no tenderness. There is no guarding.   Musculoskeletal: Normal range of motion. No edema or tenderness.   Neurological: Alert and oriented to person, place, and time.  No gross focal deficits  Skin: Skin is warm and dry.   Psychiatric: Normal mood and affect.  Behavior is normal.   Nursing note and vitals reviewed.    Differential diagnosis includes acute on chronic hypoxic respiratory failure, progressive interstitial lung disease/sarcoidosis, secondary viral infection pneumonia or pulmonary edema/pleural effusion.      ED Course     Labs Reviewed   COMP METABOLIC PANEL (14) - Abnormal; Notable for the following components:       Result Value    Glucose 102 (*)     All other components within normal limits   D-DIMER - Abnormal; Notable for the following components:    D-Dimer 1.42 (*)     All other components within normal limits   CBC W/ DIFFERENTIAL - Abnormal; Notable for the following components:    WBC 15.5 (*)     HGB 11.5 (*)     MCH 24.1 (*)     MCHC 29.9 (*)     RDW-SD 47.7 (*)     RDW 16.4 (*)     Neutrophil Absolute Prelim 11.31 (*)     Neutrophil Absolute 11.31 (*)     All other components within normal limits   PROCALCITONIN - Normal   BNP (B TYPE  NATRIURETIC PEPTIDE) - Normal   TROPONIN I HIGH SENSITIVITY - Normal   SARS-COV-2/FLU A AND B/RSV BY PCR (GENEXPERT) - Normal    Narrative:     This test is intended for the qualitative detection and differentiation of SARS-CoV-2, influenza A, influenza B, and respiratory syncytial virus (RSV) viral RNA in nasopharyngeal or nares swabs from individuals suspected of respiratory viral infection consistent with COVID-19 by their healthcare provider. Signs and symptoms of respiratory viral infection due to SARS-CoV-2, influenza, and RSV can be similar.    Test performed using the Xpert Xpress SARS-CoV-2/FLU/RSV (real time RT-PCR)  assay on the GeneXpert instrument, [employer], [place] [address].   This test is being used under the Food and Drug Administration's Emergency Use Authorization.    The authorized Fact Sheet for Healthcare Providers for this assay is available upon request from the laboratory.   CBC WITH DIFFERENTIAL WITH PLATELET    Narrative:     The following orders were created for panel order CBC With Differential With Platelet.  Procedure                               Abnormality         Status                     ---------                               -----------         ------                     CBC W/ DIFFERENTIAL[[id number]]          Abnormal            Final result                 Please view results for these tests on the individual orders.     EKG    Rate, intervals and axes as noted on EKG Report.  Rate: 130  Rhythm: Sinus Rhythm  Reading: Likely LVH and rate related changes, no obvious acute ischemic changes                 CT CHEST PE AORTA (IV ONLY) (CPT=71260)    Result Date: 2/26/2024  PROCEDURE: CT CHEST PE AORTA (IV ONLY) (CPT=71260)  COMPARISON: [facility], XR CHEST AP PORTABLE (CPT=71045), 2/26/2024, 8:25 AM.  [facility], CT CHEST HI RESOLUTION (CPT=71250), 2/13/2024, 3:00 PM.  INDICATIONS: SOB  TECHNIQUE: CT images of the chest were obtained with  non-ionic intravenous contrast material.  Automated exposure control for dose reduction was used. Adjustment of the mA and/or kV was done based on the patient's size. Use of iterative reconstruction technique for dose reduction was used. Dose information is transmitted to the ACR (American College of Radiology) NRDR (National Radiology Data Registry) which includes the Dose Index Registry.  FINDINGS:  CARDIAC: The heart is within normal limits of size.  There are coronary artery calcifications (3:69).  No pericardial effusion. MEDIASTINUM/CHRISTINE: No mass or enlarged adenopathy.  There is been slight interval increase in size of a few nonenlarged mediastinal lymph nodes with a right paratracheal lymph node measuring 6 mm (3:32), previously 4 mm.  There is a 7 mm right lower hilar  lymph node, previously 5 mm. LUNGS: There is been mild interval increase in the dense consolidation involving the left lower lobe.  There is opacification of multiple segmental and subsegmental bronchi involving the left lower lobe.  There has been interval increase in the multifocal ground-glass opacities throughout both lungs (3:71).  There is unchanged multifocal bronchiectasis, which is most notable in the bilateral upper lobes (3:39).  The previously demonstrated consolidations involving the bilateral upper lobes, lingula, refer periphery of the right lower lobe, and right middle lobe are again seen.  No evidence of pulmonary edema.  The previously described pulmonary nodules not well seen on this study. VASCULATURE: The main pulmonary artery measures 3.0 cm.  No acute pulmonary embolism through the segmental pulmonary arteries. THORACIC AORTA: The ascending aorta is normal in caliber.  There is a 2 vessel aortic arch.  Mild atherosclerotic calcification of the aortic arch.  No dissection. PLEURA: There is a moderate left pleural effusion (6:119), which has increased in size when compared to 02/13/2024.  No right pleural effusion.  No  pneumothorax. CHEST WALL: No axillary mass or enlarged adenopathy.  LIMITED ABDOMEN: Gallbladder is surgically absent. BONES: No acute fracture.  No aggressive osseous lesion.  There are multilevel degenerative changes of the thoracic spine.  There is an 11 mm right glenoid subchondral cyst (3:17).  Mild right greater than left glenohumeral joint osteoarthrosis. OTHER: Negative.          CONCLUSION:   1. No acute pulmonary embolism through the segmental level. 2. Multiple new ground-glass opacities throughout both lungs, which may reflect multifocal pneumonia, alveolar sarcoidosis, or less likely other etiologies. 3. Mild interval increase in the left lower lobe consolidation, which is concerning for pneumonia.  There is again seen opacification of multiple left lower lobe segmental and subsegmental bronchi, which may reflect mucous plugging. 4. Enlarging moderate left pleural effusion. 5. Redemonstrated upper lobe predominant bronchiectasis with fibrosis and multifocal nodular consolidations involving both lungs, which were present on the prior CT chest dated 02/13/2024 and may be secondary to sarcoidosis, or other infectious/inflammatory etiologies. 6. Coronary artery calcifications. 7. Lesser incidental findings described above.     Dictated by (CST): Leobardo Colon MD on 2/26/2024 at 11:13 AM     Finalized by (CST): Leobardo Colon MD on 2/26/2024 at 11:30 AM          XR CHEST AP PORTABLE  (CPT=71045)    Result Date: 2/26/2024  PROCEDURE: XR CHEST AP PORTABLE  (CPT=71045) TIME: 0825 hours  COMPARISON: [facility], CT CHEST HI RESOLUTION (CPT=71250), 2/13/2024, 3:00 PM.  [facility], XR CHEST PA + LAT CHEST (CPT=71046), 1/10/2024, 4:22 PM.  [facility], XR CHEST PA + LAT CHEST (ZVT=13005), 7/14/2023, 8:56 AM.  [facility], XR CHEST AP PORTABLE (CPT=71045), 2/12/2024, 8:10 PM.  INDICATIONS: Shortness of breath and cough x 3 days.   TECHNIQUE:   Single view.   FINDINGS:  CARDIAC/VASC: Borderline cardiomegaly.  MEDIAST/CHRISTINE:   Atherosclerotic aorta with no visible aneurysm.  LUNGS/PLEURA: Extensive bilateral mixed alveolar and interstitial multifocal airspace opacification with bibasilar pleural reaction and peripheral pleural reaction left mid hemithorax that has progressed BONES: Mild scoliosis with mild degenerative disc disease and spondylosis.  OTHER: Negative.          CONCLUSION:  1. Borderline cardiomegaly .  Atherosclerotic aorta. 2. Extensive bilateral mixed alveolar and interstitial multifocal airspace disease with slight progression. 3. Slight progression of bilateral pleural reaction worse on the left at the bases    Dictated by (CST): Rikki Samano MD on 2/26/2024 at 8:39 AM     Finalized by (CST): Rikki Samano MD on 2/26/2024 at 8:45 AM          CT CHEST HI RESOLUTION (CPT=71250)    Result Date: 2/13/2024  PROCEDURE: CT CHEST HI RESOLUTION (CPT=71250)  COMPARISON: None.  INDICATIONS: Sarcoidosis and ILD  TECHNIQUE:   Multiple axial CT images of the chest were obtained without intravenous contrast material.  High resolution CT was also performed to evaluate for interstitial lung disease.  Automated exposure control for dose reduction was used. Adjustment of the mA and/or kV was done based on the patient's size. Use of iterative reconstruction technique for dose reduction was used.  Dose information is transmitted to the ACR (American College of Radiology) NRDR (National Radiology Data Registry) which includes the Dose Index Registry.  FINDINGS:  CARDIAC: No enlargement, pericardial thickening.  There are moderate coronary artery calcifications. MEDIASTINUM/CHRISTINE: No mass or enlarged adenopathy.  LUNGS/PLEURA: Central airways are patent.  There is consolidation with air bronchograms in the left lower lobe.  There is multifocal alveolar opacity, which is in a predominantly peribronchial distribution, throughout both  lungs.  Some areas of opacity are somewhat nodular in appearance.  For example, there is an 8 millimeter nodule in the right lower lobe laterally (series 4, image 60).  There is a small to moderate left pleural effusion  VASCULATURE: Main pulmonary artery is not enlarged.  There is a right sided aortic arch, without ascending thoracic aortic aneurysm.  THORACIC AORTA: Normal size for age.  No aneurysm.  CHEST WALL: No mass or enlarged adenopathy.  LIMITED ABDOMEN: Limited images of the upper abdomen are unremarkable.  BONES: There is multilevel degenerative disease of the spine. OTHER: UnremarkableThere are flowing ventral osteophytes at multiple consecutive levels, compatible with diffuse idiopathic skeletal hyperostosis (DISH).  HRCT:   No groundglass changes to suggest air-trapping.  No bronchiectasis or bronchiolectasis.  No interstitial lung changes or architectural distortion.          CONCLUSION:  1. HRCT demonstrates extensive bilateral multifocal airspace opacities, predominantly peribronchial in distribution.  Finding is thought to relate to underlying sarcoidosis.  Areas of multifocal more confluent opacity, including at the left lung base may  be secondary to the same process.  Superimposed pneumonia or developing chronic organizing pneumonia are differential considerations.  Attention on follow-up CT chest in 3 to 6 months.   2. Exam also demonstrates 8 millimeter nodule in the right lower lobe, thought to relate to underlying sarcoidosis, with other etiologies within the differential at this time..  Recommend attention on follow-up CT chest.  3. Small to moderate left pleural effusion.  Nonspecific , but can also be secondary to sarcoidosis.  Dictated by (CST): Jessica Starkey MD on 2/13/2024 at 4:04 PM     Finalized by (CST): Jessica Starkey MD on 2/13/2024 at 4:15 PM          XR CHEST AP PORTABLE  (CPT=71045)    Result Date: 2/12/2024  PROCEDURE: XR CHEST AP PORTABLE  (CPT=71045) TIME: 2017 hours.    COMPARISON: [facility], XR CHEST PA + LAT CHEST (CPT=71046), 1/10/2024, 4:22 PM.  [facility], XR CHEST PA + LAT CHEST (FVZ=67088), 7/14/2023, 8:56 AM.  INDICATIONS: Shortness of breath x2 days.  TECHNIQUE:   Single view.   FINDINGS:  DEVICES: None. CARDIOMEDIASTINAL:The cardiomediastinal silhouette is within normal limits for size.  There is calcific atherosclerosis of the thoracic aorta. CHRISTINE:     The hilar contours are within normal limits. LUNGS/PLEURA: There are increased multifocal alveolar airspace opacities compared to prior.  There is confluent left retrocardiac opacity.  There is blunting of the costophrenic angles bilaterally.  There is a background of increased interstitial markings bilaterally. BONES/OTHER:  There is multilevel degenerative disease of the spine.          CONCLUSION:   Extensive bilateral multifocal airspace opacities, at least moderately progressed compared to 01/10/2024.  Findings are concerning for multifocal aspiration/pneumonia.  Small right and small to moderate left pleural effusions.  Background of pulmonary fibrosis     Dictated by (CST): Jessica Starkey MD on 2/12/2024 at 8:34 PM     Finalized by (CST): Jessica Starkey MD on 2/12/2024 at 8:36 PM                  [facility]        Admission disposition: 2/26/2024 11:35 AM                                        Medical Decision Making  Patient started on an hour-long neb and given 1 dose of Solu-Medrol before my arrival.  She is clinically better now but on 3 L O2 and still tachypneic.  Spoke with Dr. Rawls, plan will be for likely possible diuresis if her chest x-ray appears to be a pulmonary edema pattern.  If it does not appear this way we may need to repeat her CT of her chest to rule out a PE given she has a new pinkish sputum today.  Patient will be admitted by the hospitalist on oxygen to telemetry.  No gross signs of infection at this time.      CT results as noted.  BNP is low  as stated.  Pro-Jonh is normal so low concern for bacterial infectious etiology.  Inpatient team will provide ongoing care at this time as the patient goes upstairs for    Problems Addressed:  Acute on chronic respiratory failure with hypoxia (HCC): chronic illness or injury with severe exacerbation, progression, or side effects of treatment that poses a threat to life or bodily functions  Sarcoidosis: chronic illness or injury with severe exacerbation, progression, or side effects of treatment that poses a threat to life or bodily functions    Amount and/or Complexity of Data Reviewed  External Data Reviewed: notes.     Details: Outpt 2/21/24 Pulm Note:    1. 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  Diagnosed with pulm sarcoidosis 1989 by bronchoscopy and biopsy at [place]  Recent hospitalization  HRCT consistent ILD/sarcoidosis   Worsening symptoms in the last 5 to 6 months  Normal procalcitonin   Elevated ESR      Still with shortness of breath and cough and wheezes  Currently on 50 mg prednisone  Cut down to 40 mg for 5 days  Then 30 mg for 1 week then 20 mg for 1 week then 10 mg for 4 weeks then 5 mg for 4 weeks then stop  If symptoms worsen to call me      2-asthma   ICS/LABA  Albuterol inhaler and nebulizers     3-ELPIDIO  CPAP nightly /claimed poor compliance  Download available  Encourage patient to use CPAP every night and all night     4-CHF and right heart failure  ACE inhibitor and hydrochlorothiazide  Pitting edema lower extremity  Add Lasix 20 mg every other day /to hold hydrochlorothiazide when she takes Lasix     5-chronic respiratory failure with hypoxia  Home O2 at 2 L nasal cannula     I have reviewed the oxygen testing performed on this date 2/21/24 and the patient will need home oxygen at 2 LPM continuously via nasal cannula due to hypoxia. This condition is expected to improve with oxygen. The patient will need portable oxygen because they are mobile at  home.           F/u in 4 weeks   40 min with pt today     Labs: ordered. Decision-making details documented in ED Course.  Radiology: ordered and independent interpretation performed. Decision-making details documented in ED Course.     Details: By my review there is no obvious evidence of pulmonary edema, pleural effusion, pneumothorax or focal infiltrate on x-ray imaging.  ECG/medicine tests: ordered and independent interpretation performed. Decision-making details documented in ED Course.    Risk  Drug therapy requiring intensive monitoring for toxicity.  Decision regarding hospitalization.    Critical Care  Total time providing critical care: minutes (I spent a total of 35 minutes of critical care time in obtaining history, performing a physical exam, bedside monitoring of interventions, collecting and interpreting tests and discussion with consultants but not including time spent performing procedures.  )        Disposition and Plan     Clinical Impression:  1. Acute on chronic respiratory failure with hypoxia (HCC)    2. Sarcoidosis         Disposition:  Admit  2/26/2024 11:35 am    Follow-up:  No follow-up provider specified.  We recommend that you schedule follow up care with a primary care provider within the next three months to obtain basic health screening including reassessment of your blood pressure.      Medications Prescribed:  Current Discharge Medication List                            Hospital Problems       Present on Admission  Date Reviewed: 2/21/2024            ICD-10-CM Noted POA    * (Principal) Acute on chronic respiratory failure with hypoxia (HCC) J96.21 2/26/2024 Unknown

## 2024-02-26 NOTE — CONSULTS
[facility]    Consult Note    Date:  2024  Date of Admission:  2024    Chief Complaint:   Freya Martinez is a(n) 68 year old female with dyspnea.    HPI:   The patient endorses a history of pulmonary sarcoidosis status post lung biopsy in the  at [place].  She had been doing well for many years and more recently was initiated on empiric steroid therapy.  In the past, she would only need very short courses of steroids when she would have asthma exacerbations.  She also has a diagnosis of obstructive sleep apnea and uses CPAP nightly.  She was recently in hospital for dyspnea syndrome and was discharged on prednisone 60 mg tapered to 50 then 40 mg which she is on currently.  She returned to the emergency room today with increased shortness of breath and coughing at night.  She now has pink-tinged sputum.  She is not on home oxygen but she does have an oximeter and noted that her saturations were dipping into the 70s.  She denies pleurisy, personal nor family history of deep venous thrombosis, TB exposure, fevers.  She did have a chill and shake.    History     Past Medical History:   Diagnosis Date    Ascariosis     Hypertension      History reviewed. No pertinent surgical history.    Family history-mother alive and well, father  pneumonia.    Social History: , 1 son, quit tobacco 25 years ago after 1/2 pack/day, no alcohol, retired from the [employer]  Social History     Socioeconomic History    Marital status:    Tobacco Use    Smoking status: Former    Smokeless tobacco: Former   Substance and Sexual Activity    Alcohol use: Not Currently    Drug use: Never     Social Determinants of Health     Financial Resource Strain: Low Risk  (2/15/2024)    Financial Resource Strain     Difficulty of Paying Living Expenses: Not hard at all     Med Affordability: No   Food Insecurity: No Food Insecurity (2024)    Food Insecurity     Food Insecurity: Never true    Transportation Needs: No Transportation Needs (2/15/2024)    Transportation Needs     Lack of Transportation: No   Housing Stability: Low Risk  (2/12/2024)    Housing Stability     Housing Instability: No     Allergies/Medications:   Allergies: No Known Allergies  (Not in a hospital admission)      Review of Systems:   Review of Systems:  Vision normal. Ear nose and throat normal. Bowel normal. Bladder function normal. No depression. No thyroid disease. No lymphatic system concerns.  No rash. Muscles and joints unremarkable. No weight loss no weight gain.    Physical Exam:   Vital Signs:  not currently breastfeeding.     Alert obese black female  HEENT examination is unremarkable with pupils equal round and reactive to light and accommodation.   Neck without adenopathy, thyromegaly, JVD nor bruit.   Lungs diffuse wet rales to auscultation and percussion.  Cardiac regular rate and rhythm no murmur.   Abdomen nontender, without hepatosplenomegaly and no mass appreciable.   Extremities without clubbing cyanosis nor edema.   Neurologic grossly intact with symmetric tone and strength and reflex.  Skin without gross abnormality    Results:     Lab Results   Component Value Date    WBC 15.5 02/26/2024    HGB 11.5 02/26/2024    HCT 38.5 02/26/2024    .0 02/26/2024    CREATSERUM 0.85 02/26/2024    BUN 16 02/26/2024     02/26/2024    K 3.5 02/26/2024     02/26/2024    CO2 29.0 02/26/2024     02/26/2024    CA 8.7 02/26/2024    ALB 4.0 02/26/2024    ALKPHO 104 02/26/2024    BILT 0.5 02/26/2024    TP 7.8 02/26/2024    AST 17 02/26/2024    ALT 16 02/26/2024     Chest x-ray-findings concerning for pulmonary edema with left greater than right basilar infiltrates.    Assessment/Plan:   1.  Acute on chronic respiratory failure-multifactorial.  The patient has pulmonary sarcoidosis, probable mild COPD having quit smoking 25 years ago, carries a diagnosis of intrinsic asthma, now with a presentation which  looks like pulmonary edema radiographically and the patient has diffuse wet rales with pink-tinged sputum.  However, she had a BNP of 4 a few weeks ago.  Will evaluate with repeat BNP and a D-dimer to screen for cardiomyopathy and venous thromboembolism.  If the D-dimer is positive, would proceed to repeat CT imaging of the chest.    Recommendations:  1.  BNP  2.  D-dimer, and if elevated would proceed to CT for PE  3.  Solu-Medrol  4.  DuoNebs  5.  If the BNP is up, would proceed to echo  6.  May require Lasix but will await above results.  7.  Would send the expanded viral respiratory panel.    2.  DVT prophylaxis-await the above results before applying SCDs    3.  Obstructive sleep apnea-PAP therapy nightly    I am delighted to assist with Freya's care.      Robert Rawls MD  Medical Director, Critical Care, [facility]  Medical Director, [facility]  Pager: [phone]

## 2024-02-26 NOTE — H&P
[facility]    History and Physical     Freya Martinez Patient Status:  Emergency    1955 MRN [id number]   Location [facility] EMERGENCY DEPARTMENT Attending Scott Sanches MD   Hosp Day # 0 PCP FILEMON STALEY MD     History of Present Illness: Freya Martinez is a 68 year old female with a past medical history of ILD/Sarcoidosis who was discharged two weeks ago after being admitted for SOB. He was discharged on a long taper of steroids.   The patient returned to the ER today with similar complaints. She endorsed sob since last night, chills and increased sputum production.   She denied any fever, n/v/d or any other complaints.    Past Medical History:  Past Medical History:   Diagnosis Date    Ascariosis     Hypertension         Past Surgical History: History reviewed. No pertinent surgical history.    Social History:  reports that she has quit smoking. She has quit using smokeless tobacco. She reports that she does not currently use alcohol. She reports that she does not use drugs.    Family History: No family history on file.    Allergies: No Known Allergies    Medications:    Current Facility-Administered Medications on File Prior to Encounter   Medication Dose Route Frequency Provider Last Rate Last Admin    [COMPLETED] albuterol (Ventolin) (5 MG/ML) 0.5% nebulizer solution 10 mg  10 mg Nebulization Once Gómez Aiken MD   10 mg at 24    [COMPLETED] methylPREDNISolone sodium succinate (Solu-MEDROL) injection 125 mg  125 mg Intravenous Once Gómez Aiken MD   125 mg at 24    [COMPLETED] doxycycline (Vibramycin) cap 100 mg  100 mg Oral Once Gómez Aiken MD   100 mg at 24    [COMPLETED] cefTRIAXone (Rocephin) 2 g in D5W 100 mL IVPB-ADD  2 g Intravenous Once Gómez Aiken  mL/hr at 24 2 g at 24     Current Outpatient Medications on File Prior to Encounter   Medication Sig Dispense Refill     predniSONE 10 MG Oral Tab Take 3 tabs (30mg) daily for 7 days, then take 2 tabs (20mg) daily for 7 days, then take 1 tab (10mg) daily for 7 days then take 1/2tab 5 mg daily x 7 days then stop 60 tablet 0    furosemide (LASIX) 20 MG Oral Tab Take 1 tablet (20 mg total) by mouth every other day. 10 tablet 0    fluticasone furoate-vilanterol 200-25 MCG/ACT Inhalation Aerosol Powder, Breath Activated Inhale 1 puff into the lungs daily. 60 each 0    diclofenac 1.3 % External Patch Apply 1 patch topically Q12H.      [] doxycycline 100 MG Oral Cap Take 1 capsule (100 mg total) by mouth 2 (two) times daily for 4 days. 8 capsule 0    predniSONE 20 MG Oral Tab Take 3 tablets (60 mg total) by mouth daily with breakfast for 7 days, THEN 2.5 tablets (50 mg total) daily with breakfast for 7 days. THEN 2 tablets (40 mg total) daily for 7 days, THEN 1.5 tablets (30 mg total) for 7 days, THEN continue with 1 tablet of 20 mg until advised otherwise by your pulmonologist. 70 tablet 0    hydroCHLOROthiazide 25 MG Oral Tab Take 1 tablet (25 mg total) by mouth daily.      pantoprazole 40 MG Oral Tab EC Take 1 tablet (40 mg total) by mouth every morning before breakfast. As needed      montelukast 10 MG Oral Tab Take 1 tablet (10 mg total) by mouth once as needed (allergies).      ipratropium-albuterol 0.5-2.5 (3) MG/3ML Inhalation Solution Take 3 mL by nebulization every 4 (four) hours as needed (for SOB/wheezing).      Albuterol Sulfate  (90 Base) MCG/ACT Inhalation Aero Soln INHALE TWO PUFFS PO QID PRN      HYDROcodone-acetaminophen 7.5-325 MG Oral Tab TK 1 T PO  Q 6 H PRN P      lisinopril 20 MG Oral Tab Take 1 tablet (20 mg total) by mouth daily.         Review of Systems:   A comprehensive 14 point review of systems was completed.    Pertinent positives and negatives noted in the HPI.    Physical Exam:    /76   Pulse 91   Resp 23   LMP  (LMP Unknown)   SpO2 96%   General: No acute distress. Alert and oriented  x 3.  HEENT: Normocephalic atraumatic. Moist mucous membranes. EOM-I. PERRLA. Anicteric.  Respiratory: coarse breath sounds bilaterally  Cardiovascular: S1, S2. Regular rate and rhythm. No murmurs, no rubs or gallops. Equal pulses.   Abdomen: Soft, nontender, nondistended.  Positive bowel sounds. No rebound, guarding or organomegaly.  Neurologic: No focal neurological deficits. CNII-XII grossly intact.  Musculoskeletal: Moves all extremities.  Extremities: No edema or cyanosis.  Integument: No rashes or lesions.   Psychiatric: Appropriate mood and affect.      Diagnostic Data:      Labs:  Recent Labs   Lab 02/26/24  0707   WBC 15.5*   HGB 11.5*   MCV 80.5   .0       Recent Labs   Lab 02/26/24  0707   *   BUN 16   CREATSERUM 0.85   CA 8.7   ALB 4.0      K 3.5      CO2 29.0   ALKPHO 104   AST 17   ALT 16   BILT 0.5   TP 7.8       Estimated Creatinine Clearance: 62.5 mL/min (based on SCr of 0.85 mg/dL).    No results for input(s): \"PTP\", \"INR\" in the last 168 hours.    No results for input(s): \"TROP\", \"CK\" in the last 168 hours.    Imaging: Imaging data reviewed in Epic.      ASSESSMENT / PLAN:     Acute on chronic respiratory failure 2/2 pulmonary sarcoidosis/ILD and COPD. Possible asthma exacerbation  CXR reviewed  CT chest negative for PE  BNP 18, Dimer 1.42, Procal negative  Pulm on consult  Solumedrol, duonebs  Lasix 40 mg IV BID due to bilateral LE edema despite normal CXR and normal BNP  Strict Is and Os, daily weights  Saturating well on 3-4L NC - wean as tolerated  ELPIDIO  CPAP  Continue protocol  Morbid obesity  BMI 42  Counseled on making healthy lifestyle and dietary changes  HTN  BP well controlled  Continue home lisinopril and hydrochlorothiazide  Monitor vitals      Quality:  DVT Prophylaxis: SCDs  CODE status: Full      Plan of care discussed with ED physician      Jennifer Lundberg MD  2/26/2024      **Certification      PHYSICIAN Certification of Need for Inpatient Hospitalization -  Initial Certification    Patient will require inpatient services that will reasonably be expected to span two midnight's based on the clinical documentation in H+P.   Based on patients current state of illness, I anticipate that, after discharge, patient will require TBD.        The 21st Century Cures Act makes medical notes like these available to patients in the interest of transparency. Please be advised this is a medical document. Medical documents are intended to carry relevant information, facts as evident, and the clinical opinion of the practitioner. The medical note is intended as peer to peer communication and may appear blunt or direct. It is written in medical language and may contain abbreviations or verbiage that are unfamiliar.

## 2024-02-26 NOTE — ED QUICK NOTES
.Orders for admission, patient is aware of plan and ready to go upstairs. Any questions, please call ED MAGEN oro at extension [phone]   Type of COVID test sent:genexpert  COVID Suspicion level: Low/High - low    Titratable drug(s) infusing:none  Rate:    LOC at time of transport:A&Ox4    Other pertinent information    CIWA score=  NIH score=

## 2024-02-26 NOTE — CM/SW NOTE
Pt continues to require O2, and does not use at home.  SW uploaded referral via [employer].    RN will need to document the following   Documentation for O2 sats: (RN to add to progress note)  Patient's O2 sat on room air is ____% at rest. Pt's O2 sat on room air is ____% when ambulating, and ____% on ____ liter while ambulating.      (Reminder: The \"at rest\" and \"while ambulating\" are required statements. If patient is non ambulatory you can use \"with exertion\" such as during a transfer to assess their O2 level)    MD will need to add the following verbiage to progress note:    I had a face to face visit with Freya and I evaluated the her O2 saturations.  Freya is mobile in her home and is in need of a portable oxygen tank.  The home oxygen will improve Freya's condition.     Will need MDO cosigned.    SW/TIFF to remain available for support and/or discharge planning.      Asia Rahman, MSW, LSW  Social Work   Ext:#[phone]

## 2024-02-27 NOTE — PLAN OF CARE
Received pt in stable condition. VSS, on 5 L O2, CPAP on while sleeping. All fall & safety precautions in place for pt. Continuous telemetry & pulse oximetry monitoring in place. Pt on IV Solu-medrol Q 12 hrs. Pt refused Sub-Q heparin, pt ambulates frequently. Call light placed within pt.'s reach. Will continue to monitor for any new, acute changes                  Problem: Patient Centered Care  Goal: Patient preferences are identified and integrated in the patient's plan of care  Description: Interventions:  - What would you like us to know as we care for you? I live at home with my .  - Provide timely, complete, and accurate information to patient/family  - Incorporate patient and family knowledge, values, beliefs, and cultural backgrounds into the planning and delivery of care  - Encourage patient/family to participate in care and decision-making at the level they choose  - Honor patient and family perspectives and choices  Outcome: Progressing     Problem: Patient/Family Goals  Goal: Patient/Family Long Term Goal  Description: Patient's Long Term Goal: To go home    Interventions:  - Titrate oxygen down  - Medication compliance  - Follow provider recommendations  - See additional Care Plan goals for specific interventions  Outcome: Progressing  Goal: Patient/Family Short Term Goal  Description: Patient's Short Term Goal: To breathe easier    Interventions:   - Oxygen therapy  - IV solumedrol  - Nebulizer treatments  - Further testing  - Follow provider recommendations  - See additional Care Plan goals for specific interventions  Outcome: Progressing     Problem: CARDIOVASCULAR - ADULT  Goal: Maintains optimal cardiac output and hemodynamic stability  Description: INTERVENTIONS:  - Monitor vital signs, rhythm, and trends  - Monitor for bleeding, hypotension and signs of decreased cardiac output  - Evaluate effectiveness of vasoactive medications to optimize hemodynamic stability  - Monitor arterial  and/or venous puncture sites for bleeding and/or hematoma  - Assess quality of pulses, skin color and temperature  - Assess for signs of decreased coronary artery perfusion - ex. Angina  - Evaluate fluid balance, assess for edema, trend weights  Outcome: Progressing  Goal: Absence of cardiac arrhythmias or at baseline  Description: INTERVENTIONS:  - Continuous cardiac monitoring, monitor vital signs, obtain 12 lead EKG if indicated  - Evaluate effectiveness of antiarrhythmic and heart rate control medications as ordered  - Initiate emergency measures for life threatening arrhythmias  - Monitor electrolytes and administer replacement therapy as ordered  Outcome: Progressing     Problem: RESPIRATORY - ADULT  Goal: Achieves optimal ventilation and oxygenation  Description: INTERVENTIONS:  - Assess for changes in respiratory status  - Assess for changes in mentation and behavior  - Position to facilitate oxygenation and minimize respiratory effort  - Oxygen supplementation based on oxygen saturation or ABGs  - Provide Smoking Cessation handout, if applicable  - Encourage broncho-pulmonary hygiene including cough, deep breathe, Incentive Spirometry  - Assess the need for suctioning and perform as needed  - Assess and instruct to report SOB or any respiratory difficulty  - Respiratory Therapy support as indicated  - Manage/alleviate anxiety  - Monitor for signs/symptoms of CO2 retention  Outcome: Progressing     Problem: SKIN/TISSUE INTEGRITY - ADULT  Goal: Skin integrity remains intact  Description: INTERVENTIONS  - Assess and document risk factors for pressure ulcer development  - Assess and document skin integrity  - Monitor for areas of redness and/or skin breakdown  - Initiate interventions, skin care algorithm/standards of care as needed  Outcome: Progressing  Goal: Oral mucous membranes remain intact  Description: INTERVENTIONS  - Assess oral mucosa and hygiene practices  - Implement preventative oral hygiene  regimen  - Implement oral medicated treatments as ordered  Outcome: Progressing

## 2024-02-27 NOTE — PROGRESS NOTES
02/27/24 1238 02/27/24 1241   Oxygen Utilization   O2 Device High flow nasal cannula High flow nasal cannula   O2 Flow Rate (L/min) 15 L/min 15 L/min   SpO2 (!) 76 %  (after walking from bathroom) (!) 89 %

## 2024-02-27 NOTE — PHYSICAL THERAPY NOTE
PHYSICAL THERAPY EVALUATION - INPATIENT     Room Number: 504/504-A  Evaluation Date: 2024  Type of Evaluation: Initial   Physician Order: PT Eval and Treat    Presenting Problem: respiratory failure     Reason for Therapy: Mobility Dysfunction and Discharge Planning    PHYSICAL THERAPY ASSESSMENT   Patient is a 68 year old female admitted 2024 for chronic respiratory failure.  Prior to admission, patient's baseline is independent in ADL's and ambulation with no assistive device.  Patient is currently functioning at baseline with bed mobility, transfers, and gait.  Patient is requiring independent as a result of the following impairments: decreased endurance/aerobic capacity and medical status.  Physical Therapy will continue to follow for duration of hospitalization.    Patient will benefit from continued skilled PT Services For duration of hospitalization, however, given the patient is functioning near baseline level do not anticipate skilled therapy needs at discharge . Patient with decreased endurance and would benefit from therapy while in the hospital.     PLAN  PT Treatment Plan: Bed mobility;Body mechanics;Patient education;Gait training;Balance training;Transfer training  Rehab Potential : Good  Frequency (Obs): 3x/week    PHYSICAL THERAPY MEDICAL/SOCIAL HISTORY   Problem List  Principal Problem:    Acute on chronic respiratory failure with hypoxia (HCC)  Active Problems:    Sarcoidosis    HOME SITUATION  Home Situation  Type of Home: House  Home Layout:  (6 MARÍA)  Stairs to Enter : 6  Railing: Yes  Stairs to Bedroom: 0  Railing: No  Lives With: Spouse  Drives: Yes  Patient Owned Equipment: None  Patient Regularly Uses: None     SUBJECTIVE  \"I was fine until Monday\"    PHYSICAL THERAPY EXAMINATION   OBJECTIVE  Precautions: None  Fall Risk: Standard fall risk    WEIGHT BEARING RESTRICTION  Weight Bearing Restriction: None                PAIN ASSESSMENT  Ratin          COGNITION  Overall Cognitive  Status:  WFL - within functional limits    RANGE OF MOTION AND STRENGTH ASSESSMENT  Lower extremity ROM is within functional limits  BLE WNL  Lower extremity strength is within functional limits  BLE WNL    BALANCE  Static Sitting: Good  Dynamic Sitting: Good  Static Standing: Good  Dynamic Standing: Fair +    AM-PAC '6-Clicks' INPATIENT SHORT FORM - BASIC MOBILITY  How much difficulty does the patient currently have...  Patient Difficulty: Turning over in bed (including adjusting bedclothes, sheets and blankets)?: None   Patient Difficulty: Sitting down on and standing up from a chair with arms (e.g., wheelchair, bedside commode, etc.): None   Patient Difficulty: Moving from lying on back to sitting on the side of the bed?: None   How much help from another person does the patient currently need...   Help from Another: Moving to and from a bed to a chair (including a wheelchair)?: None   Help from Another: Need to walk in hospital room?: A Little   Help from Another: Climbing 3-5 steps with a railing?: A Little     AM-PAC Score:  Raw Score: 22   Approx Degree of Impairment: 20.91%   Standardized Score (AM-PAC Scale): 53.28   CMS Modifier (G-Code): CJ    FUNCTIONAL ABILITY STATUS  Functional Mobility/Gait Assessment  Gait Assistance: Independent  Distance (ft): 20ft  Assistive Device: None  Rolling:  not tested   Supine to Sit:  not tested   Sit to Supine:  not tested   Sit to Stand: independent    Exercise/Education Provided:  Body mechanics  Gait training  Transfer training    The patient's Approx Degree of Impairment: 20.91% has been calculated based on documentation in the [facility] '6 clicks' Inpatient Basic Mobility Short Form.  Research supports that patients with this level of impairment may benefit from home. Patient on 15 liters of oxygen during the session, oxygen sat dropped to 73% and heart rate 122, increased to 92% within 1-1.5 minutes with rest and pursed lip breathing. Patient independent in mobility,  will plan to follow up with patient to ensure patient recovery is going well. Patient received  in bathroom , agreeable to physical therapy evaluation. Vital signs monitored as noted above,  drop in oxygen sat . Education with patient provided verbally on physical therapy plan of care, physiological benefits of out of bed mobility, and energy conservation/activity pacing. Next session anticipate to progress gait.    Patient history and/or personal factors that may impact the plan of care include home accessibility concerns. Based on the physical therapy examination of the noted systems and functional activity/participation limitations, the patient presentation is stable given the patient demonstrates no significant barriers to meeting therapy goals. Final disposition will be made by interdisciplinary medical team.    Patient End of Session: Up in chair;Needs met;Call light within reach;RN aware of session/findings;All patient questions and concerns addressed    CURRENT GOALS  Goals to be met by: 3/20/24  Patient Goal Patient's self-stated goal is: to go home   Goal #1 Patient is able to demonstrate supine - sit EOB @ level: independent     Goal #1   Current Status    Goal #2 Patient is able to demonstrate transfers Sit to/from Stand at assistance level: independent with none     Goal #2  Current Status    Goal #3 Patient is able to ambulate 100 feet with assist device: none at assistance level: independent with oxygen sat remaining above 90%   Goal #3   Current Status    Goal #4 Patient will negotiate 6 stairs/one curb w/ assistive device and supervision   Goal #4   Current Status    Goal #5 Patient to demonstrate independence with home activity/exercise instructions provided to patient in preparation for discharge.   Goal #5   Current Status    Goal #6    Goal #6  Current Status      Patient Evaluation Complexity Level:  History Low - no personal factors and/or co-morbidities   Examination of body systems Low -   addressing 1-2 elements   Clinical Presentation Low- Stable   Clinical Decision Making  Low Complexity     Gait Training: 10 minutes  Therapeutic Activity:  13 minutes

## 2024-02-27 NOTE — PLAN OF CARE
Pt a&o x4, 15L HFNC and non-rebreather with ambulation, on tele, self care. Oxygen desaturation with ambulation, non-rebreather placed. Ultrasound of chest done this afternoon with hopes of thoracentesis. Thoracentesis not preformed and will plan for CT chest repeat in a few days. Echocardiogram done this afternoon. Safety precautions maintained. Call light in reach.      Problem: Patient Centered Care  Goal: Patient preferences are identified and integrated in the patient's plan of care  Description: Interventions:  - What would you like us to know as we care for you? I live at home with my .  - Provide timely, complete, and accurate information to patient/family  - Incorporate patient and family knowledge, values, beliefs, and cultural backgrounds into the planning and delivery of care  - Encourage patient/family to participate in care and decision-making at the level they choose  - Honor patient and family perspectives and choices  Outcome: Progressing     Problem: Patient/Family Goals  Goal: Patient/Family Long Term Goal  Description: Patient's Long Term Goal: To go home    Interventions:  - Titrate oxygen down  - Medication compliance  - Follow provider recommendations  - See additional Care Plan goals for specific interventions  Outcome: Progressing  Goal: Patient/Family Short Term Goal  Description: Patient's Short Term Goal: To breathe easier    Interventions:   - Oxygen therapy  - IV solumedrol  - Nebulizer treatments  - Further testing  - Follow provider recommendations  - See additional Care Plan goals for specific interventions  Outcome: Progressing     Problem: CARDIOVASCULAR - ADULT  Goal: Maintains optimal cardiac output and hemodynamic stability  Description: INTERVENTIONS:  - Monitor vital signs, rhythm, and trends  - Monitor for bleeding, hypotension and signs of decreased cardiac output  - Evaluate effectiveness of vasoactive medications to optimize hemodynamic stability  - Monitor arterial  and/or venous puncture sites for bleeding and/or hematoma  - Assess quality of pulses, skin color and temperature  - Assess for signs of decreased coronary artery perfusion - ex. Angina  - Evaluate fluid balance, assess for edema, trend weights  Outcome: Progressing  Goal: Absence of cardiac arrhythmias or at baseline  Description: INTERVENTIONS:  - Continuous cardiac monitoring, monitor vital signs, obtain 12 lead EKG if indicated  - Evaluate effectiveness of antiarrhythmic and heart rate control medications as ordered  - Initiate emergency measures for life threatening arrhythmias  - Monitor electrolytes and administer replacement therapy as ordered  Outcome: Progressing     Problem: RESPIRATORY - ADULT  Goal: Achieves optimal ventilation and oxygenation  Description: INTERVENTIONS:  - Assess for changes in respiratory status  - Assess for changes in mentation and behavior  - Position to facilitate oxygenation and minimize respiratory effort  - Oxygen supplementation based on oxygen saturation or ABGs  - Provide Smoking Cessation handout, if applicable  - Encourage broncho-pulmonary hygiene including cough, deep breathe, Incentive Spirometry  - Assess the need for suctioning and perform as needed  - Assess and instruct to report SOB or any respiratory difficulty  - Respiratory Therapy support as indicated  - Manage/alleviate anxiety  - Monitor for signs/symptoms of CO2 retention  Outcome: Progressing     Problem: SKIN/TISSUE INTEGRITY - ADULT  Goal: Skin integrity remains intact  Description: INTERVENTIONS  - Assess and document risk factors for pressure ulcer development  - Assess and document skin integrity  - Monitor for areas of redness and/or skin breakdown  - Initiate interventions, skin care algorithm/standards of care as needed  Outcome: Progressing  Goal: Oral mucous membranes remain intact  Description: INTERVENTIONS  - Assess oral mucosa and hygiene practices  - Implement preventative oral hygiene  regimen  - Implement oral medicated treatments as ordered  Outcome: Progressing

## 2024-02-27 NOTE — PROGRESS NOTES
[facility]     Hospitalist Progress Note     Freya Martinez Patient Status:  Inpatient    1955 MRN [id number]   Location [facility]5W Attending Jennifer Lundberg MD   Hosp Day # 1 PCP FILEMON STALEY MD     Subjective:     Patient sitting up and in NAD. Denied any active complaints.   States she is tired and sob.  No acute overnight events reported by the nursing staff.       Objective:    Review of Systems:   ROS completed; pertinent positive and negatives stated in subjective.      Vital signs:  Temp:  [98.1 °F (36.7 °C)-98.5 °F (36.9 °C)] 98.5 °F (36.9 °C)  Pulse:  [] 104  Resp:  [18-35] 20  BP: (108-164)/(65-85) 108/85  SpO2:  [90 %-100 %] 91 %      Physical Exam:    Gen: NAD AO x3, appears tired  Chest: good air entry, sob, wheezing  CVS: normal s1 and s2 RR  Abd: NABS soft NT ND  Neuro: CN 2-12 grossly intact  Ext: no edema in bilateral LE      Diagnostic Data:    Labs:  Recent Labs   Lab 24  0707 24  0549   WBC 15.5* 18.8*   HGB 11.5* 12.1   MCV 80.5 79.8*   .0 298.0       Recent Labs   Lab 24  0707 24  0549   * 104*   BUN 16 22   CREATSERUM 0.85 0.85   CA 8.7 9.2   ALB 4.0 4.2    140   K 3.5 4.1    103   CO2 29.0 28.0   ALKPHO 104 109   AST 17 23   ALT 16 17   BILT 0.5 0.5   TP 7.8 8.2       Estimated Creatinine Clearance: 62.5 mL/min (based on SCr of 0.85 mg/dL).    No results for input(s): \"PTP\", \"INR\" in the last 168 hours.           Imaging: Imaging data reviewed in Epic.    Medications:    ipratropium-albuterol  3 mL Nebulization 4 times per day    methylPREDNISolone  60 mg Intravenous Q8H    pantoprazole  40 mg Oral QAM AC    sulfamethoxazole-trimethoprim DS  1 tablet Oral Daily    heparin  7,500 Units Subcutaneous Q8H LÓPEZ    lisinopril  20 mg Oral Daily    fluticasone furoate-vilanterol  1 puff Inhalation Daily       Assessment & Plan:     Acute on chronic respiratory failure 2/2 pulmonary sarcoidosis/ILD and COPD.  Possible asthma exacerbation  CXR reviewed  CT chest negative for PE  BNP 18, Dimer 1.42, Procal negative  Pulm on consult  Solumedrol, duonebs  ?atypical infection  US guided thoracentesis  Echo pending  Hold diuresis at this time  Strict Is and Os, daily weights  Saturating well on 3-4L NC - wean as tolerated  ELPIDIO  CPAP  Continue protocol  Morbid obesity  BMI 42  Counseled on making healthy lifestyle and dietary changes  HTN  BP well controlled  Continue home lisinopril and hydrochlorothiazide  Monitor vitals  Advanced care planning  Full code  ~31 minutes spent      Plan of care discussed with patient or family at bedside.      Supplementary Documentation:     Quality:  DVT Prophylaxis: Heparin s/c  CODE status: Full      Estimated date of discharge: TBD  Discharge is dependent on: clinical stability  At this point Ms. Martinez is expected to be discharge to: home                  Jennifer Lundberg MD  Hospitalist    MDM: High, I personally reviewed the available laboratories, imaging including XR. I discussed the case with RN. I ordered laboratories, studies including AM labs.  Medical decision making high, risk is high.       The 21st Century Cures Act makes medical notes like these available to patients in the interest of transparency. Please be advised this is a medical document. Medical documents are intended to carry relevant information, facts as evident, and the clinical opinion of the practitioner. The medical note is intended as peer to peer communication and may appear blunt or direct. It is written in medical language and may contain abbreviations or verbiage that are unfamiliar.

## 2024-02-27 NOTE — PROGRESS NOTES
[facility]    Progress Note    Freya Martinez Patient Status:  Inpatient    1955 MRN [id number]   Location [facility]5W Attending Jennifer Lundberg MD   Hosp Day # 1 PCP FILEMON STALEY MD         Subjective:     Constitutional:  Positive for fatigue. Negative for fever.   HENT:  Negative for congestion.    Respiratory:  Positive for cough, shortness of breath and wheezing.    Cardiovascular:  Negative for chest pain, palpitations and leg swelling.   Gastrointestinal:  Negative for abdominal distention.   Musculoskeletal: Negative.    Skin: Negative.    Neurological: Negative.    Hematological: Negative.    Psychiatric/Behavioral: Negative.       Cough / mild pink sputum no fever   On high O2 requirement   No fever or chills     Objective:   Blood pressure 108/85, pulse 104, temperature 98.5 °F (36.9 °C), temperature source Oral, resp. rate 20, weight 288 lb 12.8 oz (131 kg), SpO2 91%, not currently breastfeeding.  Physical Exam  Constitutional:       General: She is in acute distress.      Appearance: She is obese. She is ill-appearing.   HENT:      Nose: Nose normal.      Mouth/Throat:      Mouth: Mucous membranes are moist.   Eyes:      General: No scleral icterus.  Cardiovascular:      Rate and Rhythm: Normal rate.      Heart sounds: No murmur heard.     No gallop.   Pulmonary:      Effort: Respiratory distress present.      Breath sounds: No stridor. Wheezing and rales present. No rhonchi.   Abdominal:      General: Abdomen is flat. Bowel sounds are normal.      Palpations: Abdomen is soft.      Tenderness: There is no guarding.   Musculoskeletal:      Cervical back: Normal range of motion. No rigidity.      Right lower leg: No edema.      Left lower leg: No edema.   Skin:     General: Skin is dry.   Neurological:      General: No focal deficit present.      Mental Status: She is oriented to person, place, and time.         Results:   Lab Results   Component Value Date    WBC 18.8 (H)  02/27/2024    HGB 12.1 02/27/2024    HCT 39.2 02/27/2024    .0 02/27/2024    CREATSERUM 0.85 02/27/2024    BUN 22 02/27/2024     02/27/2024    K 4.1 02/27/2024     02/27/2024    CO2 28.0 02/27/2024     (H) 02/27/2024    CA 9.2 02/27/2024    ALB 4.2 02/27/2024    ALKPHO 109 02/27/2024    BILT 0.5 02/27/2024    TP 8.2 02/27/2024    AST 23 02/27/2024    ALT 17 02/27/2024    TSH 1.790 05/12/2020    DDIMER 1.42 (H) 02/26/2024    ESRML 62 (H) 02/13/2024    CRP 4.30 (H) 02/13/2024    MG 2.2 02/27/2024    TROPHS 13 02/26/2024       CT CHEST PE AORTA (IV ONLY) (CPT=71260)    Result Date: 2/26/2024  CONCLUSION:   1. No acute pulmonary embolism through the segmental level. 2. Multiple new ground-glass opacities throughout both lungs, which may reflect multifocal pneumonia, alveolar sarcoidosis, or less likely other etiologies. 3. Mild interval increase in the left lower lobe consolidation, which is concerning for pneumonia.  There is again seen opacification of multiple left lower lobe segmental and subsegmental bronchi, which may reflect mucous plugging. 4. Enlarging moderate left pleural effusion. 5. Redemonstrated upper lobe predominant bronchiectasis with fibrosis and multifocal nodular consolidations involving both lungs, which were present on the prior CT chest dated 02/13/2024 and may be secondary to sarcoidosis, or other infectious/inflammatory etiologies. 6. Coronary artery calcifications. 7. Lesser incidental findings described above.     Dictated by (CST): Leobardo Colon MD on 2/26/2024 at 11:13 AM     Finalized by (CST): Leobardo Colon MD on 2/26/2024 at 11:30 AM          XR CHEST AP PORTABLE  (CPT=71045)    Result Date: 2/26/2024  CONCLUSION:  1. Borderline cardiomegaly .  Atherosclerotic aorta. 2. Extensive bilateral mixed alveolar and interstitial multifocal airspace disease with slight progression. 3. Slight progression of bilateral pleural reaction worse on the left at the bases     Dictated by (CST): Rikki Samano MD on 2/26/2024 at 8:39 AM     Finalized by (CST): Rikki Samano MD on 2/26/2024 at 8:45 AM         EKG    Result Date: 2/26/2024  Sinus tachycardia with occasional Premature ventricular complexes Minimal voltage criteria for LVH, may be normal variant ( R in aVL ) Abnormal ECG When compared with ECG of 12-FEB-2024 19:03, Premature ventricular complexes are now Present Confirmed by JESSICA CLANCY ([id number]) on 2/26/2024 1:47:53 PM     Assessment & Plan:       1- acute on chronic respiratory failure with hypoxia   - progression of ILD secondary to pulmonary sarcoidosis with acute on subacute exacerbation  Diagnosed with pulm sarcoidosis 1989 by bronchoscopy and biopsy at [facility]  - ? Atypical infection   - asthma / ? Copd former smoker ( qquit 1990 )       HRCT 2/13/24  consistent ILD/sarcoidosis   Chest ct / PE study 2/27/24 no PE / bilateral GGOs likely sarcoidosis .  Upper lobes  lobes bronchiectasis and fibrosis .   Small to moderate effusion mainly left side .    Symptoms progressed  in the last 5 to 6 months.  And worse last few weeks       Normal procalcitonin and normal BNP   Elevated ESR   Normal ANCA   Low ACE -I level     Plan :   O2 therapy and neb   Steroid   Thoracentesis left side under ultrasound guidance   Check echo   Sputum culture         2-ELPIDIO  CPAP nightly     3-DVT prophylaxis  Heparin subcu    High risk and complex case .  D/w pt   D/w staff               Diann Mckeon MD  2/27/2024

## 2024-02-27 NOTE — OCCUPATIONAL THERAPY NOTE
OCCUPATIONAL THERAPY EVALUATION - INPATIENT     Room Number: 504/504-A  Evaluation Date: 2/27/2024  Type of Evaluation: Initial  Presenting Problem: Acute on chronic CHF    Physician Order: IP Consult to Occupational Therapy  Reason for Therapy: ADL/IADL Dysfunction and Discharge Planning    OCCUPATIONAL THERAPY ASSESSMENT   Patient is a 68 year old female admitted 2/26/2024 for acute on chronic respiratory failure.  Prior to admission, patient's baseline is independent.  Patient is currently functioning near baseline with self care and functional mobility.  Patient is requiring supervision as a result of the following impairments: decreaed endurance, activity tolerance; on 15L O2 and desaturating <85% with activities. Occupational Therapy will continue to follow for duration of hospitalization.    Patient will benefit from continued skilled OT Services For duration of hospitalization, however, given the patient is functioning near baseline level do not anticipate skilled therapy needs at discharge     PLAN  OT Treatment Plan: Energy conservation/work simplification techniques;Compensatory technique education  OT Device Recommendations: Shower chair    OCCUPATIONAL THERAPY MEDICAL/SOCIAL HISTORY   Problem List  Principal Problem:    Acute on chronic respiratory failure with hypoxia (HCC)  Active Problems:    Sarcoidosis    HOME SITUATION  Type of Home: House  Home Layout: -- (6 MAR[address]A)  Lives With: Spouse  Toilet and Equipment: Standard height toilet  Shower/Tub and Equipment: Tub-shower combo  Drives: Yes  Patient Regularly Uses: None  Prior Level of Will: Independent     SUBJECTIVE  I was supposed to get oxygen at home    OCCUPATIONAL THERAPY EXAMINATION    OBJECTIVE  Precautions: None  Fall Risk: Standard fall risk    PAIN ASSESSMENT     ACTIVITY TOLERANCE  Pulse: (!) 122 (with activities)                      O2 SATURATIONS  Oxygen Therapy  SPO2% on Oxygen at Rest: 100  At rest oxygen flow (liters per  minute): 15  SPO2% Ambulation on Oxygen: 73  Ambulation oxygen flow (liters per minute): 15    COGNITION  Overall Cognitive Status:  WFL - within functional limits      RANGE OF MOTION   Upper extremity ROM is within functional limits     STRENGTH ASSESSMENT  Upper extremity strength is within functional limits     COORDINATION  Gross Motor: WFL   Fine Motor: WFL     ACTIVITIES OF DAILY LIVING ASSESSMENT  AM-PAC ‘6-Clicks’ Inpatient Daily Activity Short Form  How much help from another person does the patient currently need…  -   Putting on and taking off regular lower body clothing?: A Little  -   Bathing (including washing, rinsing, drying)?: A Little  -   Toileting, which includes using toilet, bedpan or urinal? : A Little  -   Putting on and taking off regular upper body clothing?: A Little  -   Taking care of personal grooming such as brushing teeth?: A Little  -   Eating meals?: A Little    AM-PAC Score:  Score: 18  Approx Degree of Impairment: 46.65%  Standardized Score (AM-PAC Scale): 38.66  CMS Modifier (G-Code): CK    FUNCTIONAL TRANSFER ASSESSMENT  Sit to Stand: Edge of Bed  Edge of Bed: Supervision  Toilet Transfer: Supervision    BED MOBILITY  Rolling: Supervision  Supine to Sit : Supervision  Sit to Supine (OT): Supervision  Scooting: SPV    BALANCE ASSESSMENT  Static Sitting: Supervision  Static Standing: Supervision    FUNCTIONAL ADL ASSESSMENT  Eating: Independent  Grooming Seated: Independent  Bathing Seated: Supervision  UB Dressing Seated: Independent  LB Dressing Seated: Supervision  Toileting Seated: Modified Independent    EDUCATION PROVIDED  Patient: Role of Occupational Therapy; Plan of Care; Discharge Recommendations  Patient's Response to Education: Verbalized Understanding    The patient's Approx Degree of Impairment: 46.65% has been calculated based on documentation in the [facility] '6 clicks' Inpatient Daily Activity Short Form.  Research supports that patients with this level of impairment  may benefit from home with support; may benefit from HH therapy pending medical course, however, pt up and ambulatory with supervision, no device and managing self care at supervision level; desaturating with ADL performance to <85%. Educated on PLB, energy conservation, and recovery breathing.   .  Final disposition will be made by interdisciplinary medical team.     Patient End of Session: Up in chair    OT Goals  Patients self stated goal is: to go home     Patient will complete functional transfer with independent   Comment:     Patient will complete toileting with independent   Comment:     Patient will tolerate standing for 5-8 minutes in prep for adls with independence    Comment:    Patient will complete item retrieval with independent   Comment:          Goals  on: 3/15  Frequency: 1-2 more sessions     Patient Evaluation Complexity Level:   Occupational Profile/Medical History LOW - Brief history including review of medical or therapy records    Specific performance deficits impacting engagement in ADL/IADL LOW  1 - 3 performance deficits    Client Assessment/Performance Deficits LOW - No comorbidities nor modifications of tasks    Clinical Decision Making LOW - Analysis of occupational profile, problem-focused assessments, limited treatment options    Overall Complexity LOW     OT Session Time: 10 minutes  Self-Care Home Management: 10 minutes      Zeb Valle, Occupational Therapist, OTR/L ext [phone]

## 2024-02-28 PROBLEM — D86.0 SARCOIDOSIS OF LUNG (HCC): Status: ACTIVE | Noted: 2024-01-01

## 2024-02-28 PROBLEM — D86.0 SARCOIDOSIS OF LUNG (HCC): Status: ACTIVE | Noted: 2024-02-28

## 2024-02-28 NOTE — CONSULTS
RHEUMATOLOGY INPATIENT CONSULTATION    Freya Martinez is a 68 year old female.    ASSESSMENT/PLAN:     # Acute hypoxic respiratory failure  # Sarcoidosis/ILD (biopsy-proven)  -Patient originally diagnosed with pulmonary sarcoidosis via lung biopsy/bronch? in the 1990s at [place].  Originally, patient treated with corticosteroids with occasional burst with flares.  #Small to Moderate L Pleural Effusion  #RLL 8 mm Nodule    Chronic:  # Asthma  # ELPIDIO on CPAP with poor compliance  # CHF  # HTN    DISCUSSION:  Rheumatology consulted for patient admitted with acute on chronic hypoxic respiratory failure in the setting of pulmonary sarcoidosis (biopsy-proven)/ILD with worsening pulmonary findings on imaging.  Will order further autoimmune workup, as below.  Agree with IV Solu-Medrol.    PLAN:  -IV Solu-Medrol 60 mg every 8 hours       - S/p IV Solu-Medrol 125 mg x 1 on 2/26, IV Solu-Medrol 40 mg on 2/26 evening and 2/27 AM  -Follow-up autoimmune serologies, as below    Thank you for asking us to see this nice patient and participate in their care. We will make further recommendations as their case develops.    Digna Cloud DO,   02/28/24,   1:12 PM     HPI:     Chief Complaint   Patient presents with    Asthma       I had the pleasure of seeing Freya Martinez on 2/28/2024 as a new inpatient consultation for sarcoidosis/ILD. The patient was originally referred by Dr. Diann Mckeon.     68 year old female  w/ PMH pulmonary sarcoidosis/ILD, ELPIDIO, CHF, HTN who was admitted 2/26 in acute on chronic hypoxic respiratory failure (baseline O2 support 2 L NC) with concerns of hemoptysis. Of note, patient also with a recent admission 2/12/2024 - 2/14/2024 thought 2/2 sarcoidosis flare.  He was initially treated with IV steroids as well as antibiotics and was ultimately discharged on a prolonged p.o. prednisone taper (p.o. prednisone 60 mg daily x 7 days, 50 mg daily x 7 days, 40 mg daily x 7 days, 30 mg daily x 7 days).  However,  symptoms continue to progress and he represented to the hospital.    Currently, patient reports worsening respiratory symptoms including cough and shortness of breath starting a few months prior.  Originally, she had a productive cough bringing up phlegm which ultimately became pink-tinged the preceding Monday.  No associated fever, chills.  She reports occasional knee pain worse with weightbearing but no other significant arthralgias.  ROS otherwise negative for unintentional weight loss, night sweats, rash including no evidence of erythema nodosum nor photosensitive rash, Raynaud's phenomenon, sicca symptoms, visual abnormalities, palpitations, oral/nasal ulcers.  No family history of autoimmune disorders such as sarcoidosis, lupus, rheumatoid arthritis, psoriatic arthritis. Patient currently on HFNC at 10 L with SpO2 89%, recently on HFNC 15 L with desats to the high 80s. Patient on ceftriaxone, received IV Lasix, on scheduled DuoNebs, and on increased IV corticosteroids.     Medication History:  Patient sarcoidosis treated with corticosteroids with occasional corticosteroid bursts for flares.  She has never been on DMARD/Biologics.    HISTORY:  Past Medical History:   Diagnosis Date    Ascariosis     Asthma (HCC)     Hypertension     Sleep apnea       Social Hx Reviewed   Family Hx Reviewed     Medications (Active prior to today's visit):  No current outpatient medications on file.     .cmed  Allergies:  No Known Allergies      ROS:   Review of Systems   Constitutional:  Negative for chills and fever.   HENT:  Negative for congestion, hearing loss, mouth sores, nosebleeds and trouble swallowing.    Eyes:  Negative for photophobia, pain, redness and visual disturbance.   Respiratory:  Positive for cough and shortness of breath.    Cardiovascular:  Negative for chest pain, palpitations and leg swelling.   Gastrointestinal:  Negative for abdominal pain, blood in stool, diarrhea and nausea.   Endocrine: Negative for  cold intolerance and heat intolerance.   Genitourinary:  Negative for dysuria, frequency and hematuria.   Musculoskeletal:  Positive for arthralgias (minor, occasional knee pain). Negative for back pain, gait problem, joint swelling, myalgias, neck pain and neck stiffness.   Skin:  Negative for color change and rash.   Neurological:  Negative for dizziness, weakness, numbness and headaches.   Psychiatric/Behavioral:  Negative for confusion and dysphoric mood.         PHYSICAL EXAM:     /45 (BP Location: Right arm)   Pulse 106   Temp 98.1 °F (36.7 °C) (Oral)   Resp 18   Wt 288 lb 12.8 oz (131 kg)   LMP  (LMP Unknown)   SpO2 (!) 89%   BMI 45.23 kg/m²   HEENT: Clear oropharynx, no oral ulcers, EOM intact, clear sclear, PERRLA, pleasant, no acute distress, no CAD,   No rashes  CVS: RRR, no murmurs  RS: On HFNC, crackles especially of the upper lobes bilaterally, no wheezing, no rhonchi  ABD: Soft Non tender, no HSM felt, BS positive  Joint exam:   no neck tendnerness, good ROM,   EXTREMITIES: no cyanosis, clubbing or edema  NEURO: intact touch, 5/5 ue and le strength      RELEVANT PRIOR LABS:     2024:  ACE less than 15  CRP 4.3 (high), ESR 62 (high)  ANCA panel negative   (high)    2024:  Blood cultures x 2 negative x 5 days    RELEVANT INPATIENT LABS:     Labs ordered: ADELAIDE by IFA with reflex, APS panel, complements, dsDNA, CCP, TB/hepatitis panel    2024:  Respiratory viral panel negative    RF 11 WNL  ADELAIDE screen ______ (in process)    CBC with leukocytosis WBC 15.2 (neutrophilic predominance), normal Hg,   CMP with normal CR 0.96, nonelevated LFTs, no gamma gap noted    2004:  Sputum culture: No WBC, 1+ GPC ___(preliminary)  ESR greater than 130   (high)    24:  D-dimer 1.42 (high)  BNP 18 WNL  Troponin 13 negative x 1  Pro-Jonh less than 0.04    Imagin2024 chest ultrasound:    FINDINGS:  There is a small left pleural effusion.  Thoracentesis was  not performed by Dr. Mckeon since the amount of fluid was not that significant.               Impression   CONCLUSION:  1. Small left pleural effusion.  Thoracentesis was not performed by Dr. Mckeon.     2/26/24 CT Chest PE AORTA:       FINDINGS:  CARDIAC: The heart is within normal limits of size.  There are coronary artery calcifications (3:69).  No pericardial effusion.  MEDIASTINUM/CHRISTINE: No mass or enlarged adenopathy.  There is been slight interval increase in size of a few nonenlarged mediastinal lymph nodes with a right paratracheal lymph node measuring 6 mm (3:32), previously 4 mm.  There is a 7 mm right lower hilar   lymph node, previously 5 mm.  LUNGS: There is been mild interval increase in the dense consolidation involving the left lower lobe.  There is opacification of multiple segmental and subsegmental bronchi involving the left lower lobe.  There has been interval increase in the  multifocal ground-glass opacities throughout both lungs (3:71).  There is unchanged multifocal bronchiectasis, which is most notable in the bilateral upper lobes (3:39).  The previously demonstrated consolidations involving the bilateral upper lobes,  lingula, refer periphery of the right lower lobe, and right middle lobe are again seen.  No evidence of pulmonary edema.  The previously described pulmonary nodules not well seen on this study.  VASCULATURE: The main pulmonary artery measures 3.0 cm.  No acute pulmonary embolism through the segmental pulmonary arteries.  THORACIC AORTA: The ascending aorta is normal in caliber.  There is a 2 vessel aortic arch.  Mild atherosclerotic calcification of the aortic arch.  No dissection.  PLEURA: There is a moderate left pleural effusion (6:119), which has increased in size when compared to 02/13/2024.  No right pleural effusion.  No pneumothorax.  CHEST WALL: No axillary mass or enlarged adenopathy.    LIMITED ABDOMEN: Gallbladder is surgically absent.  BONES: No acute fracture.   No aggressive osseous lesion.  There are multilevel degenerative changes of the thoracic spine.  There is an 11 mm right glenoid subchondral cyst (3:17).  Mild right greater than left glenohumeral joint osteoarthrosis.  OTHER: Negative.                 Impression   CONCLUSION:     1. No acute pulmonary embolism through the segmental level.  2. Multiple new ground-glass opacities throughout both lungs, which may reflect multifocal pneumonia, alveolar sarcoidosis, or less likely other etiologies.  3. Mild interval increase in the left lower lobe consolidation, which is concerning for pneumonia.  There is again seen opacification of multiple left lower lobe segmental and subsegmental bronchi, which may reflect mucous plugging.  4. Enlarging moderate left pleural effusion.  5. Redemonstrated upper lobe predominant bronchiectasis with fibrosis and multifocal nodular consolidations involving both lungs, which were present on the prior CT chest dated 02/13/2024 and may be secondary to sarcoidosis, or other  infectious/inflammatory etiologies.  6. Coronary artery calcifications.  7. Lesser incidental findings described above.       2/26/24 CXR:  FINDINGS:  CARDIAC/VASC: Borderline cardiomegaly.  MEDIAST/CHRISTINE:   Atherosclerotic aorta with no visible aneurysm.    LUNGS/PLEURA: Extensive bilateral mixed alveolar and interstitial multifocal airspace opacification with bibasilar pleural reaction and peripheral pleural reaction left mid hemithorax that has progressed  BONES: Mild scoliosis with mild degenerative disc disease and spondylosis.    OTHER: Negative.                 Impression   CONCLUSION:  1. Borderline cardiomegaly .  Atherosclerotic aorta.  2. Extensive bilateral mixed alveolar and interstitial multifocal airspace disease with slight progression.  3. Slight progression of bilateral pleural reaction worse on the left at the bases     2/13/24 HRCT:  FINDINGS:  CARDIAC: No enlargement, pericardial thickening.  There  are moderate coronary artery calcifications.  MEDIASTINUM/CHRISTINE: No mass or enlarged adenopathy.    LUNGS/PLEURA: Central airways are patent.  There is consolidation with air bronchograms in the left lower lobe.  There is multifocal alveolar opacity, which is in a predominantly peribronchial distribution, throughout both lungs.  Some areas of opacity  are somewhat nodular in appearance.  For example, there is an 8 millimeter nodule in the right lower lobe laterally (series 4, image 60).     There is a small to moderate left pleural effusion     VASCULATURE: Main pulmonary artery is not enlarged.  There is a right sided aortic arch, without ascending thoracic aortic aneurysm.    THORACIC AORTA: Normal size for age.  No aneurysm.    CHEST WALL: No mass or enlarged adenopathy.    LIMITED ABDOMEN: Limited images of the upper abdomen are unremarkable.    BONES: There is multilevel degenerative disease of the spine.  OTHER: UnremarkableThere are flowing ventral osteophytes at multiple consecutive levels, compatible with diffuse idiopathic skeletal hyperostosis (DISH).     HRCT:   No groundglass changes to suggest air-trapping.  No bronchiectasis or bronchiolectasis.  No interstitial lung changes or architectural distortion.                 Impression   CONCLUSION:  1. HRCT demonstrates extensive bilateral multifocal airspace opacities, predominantly peribronchial in distribution.  Finding is thought to relate to underlying sarcoidosis.  Areas of multifocal more confluent opacity, including at the left lung base may   be secondary to the same process.  Superimposed pneumonia or developing chronic organizing pneumonia are differential considerations.  Attention on follow-up CT chest in 3 to 6 months.       2. Exam also demonstrates 8 millimeter nodule in the right lower lobe, thought to relate to underlying sarcoidosis, with other etiologies within the differential at this time..  Recommend attention on follow-up CT chest.      3. Small to moderate left pleural effusion.  Nonspecific , but can also be secondary to sarcoidosis.

## 2024-02-28 NOTE — PLAN OF CARE
Pt a&o x4, 10L HFNC, on tele, self care. Oxygen desaturation with ambulation. IV antibiotics started this morning. Safety precautions maintained. Call light in reach.      Problem: Patient Centered Care  Goal: Patient preferences are identified and integrated in the patient's plan of care  Description: Interventions:  - What would you like us to know as we care for you? I live at home with my .  - Provide timely, complete, and accurate information to patient/family  - Incorporate patient and family knowledge, values, beliefs, and cultural backgrounds into the planning and delivery of care  - Encourage patient/family to participate in care and decision-making at the level they choose  - Honor patient and family perspectives and choices  Outcome: Progressing     Problem: Patient/Family Goals  Goal: Patient/Family Long Term Goal  Description: Patient's Long Term Goal: To go home    Interventions:  - Titrate oxygen down  - Medication compliance  - Follow provider recommendations  - See additional Care Plan goals for specific interventions  Outcome: Progressing  Goal: Patient/Family Short Term Goal  Description: Patient's Short Term Goal: To breathe easier    Interventions:   - Oxygen therapy  - IV solumedrol  - Nebulizer treatments  - Further testing  - Follow provider recommendations  - See additional Care Plan goals for specific interventions  Outcome: Progressing     Problem: CARDIOVASCULAR - ADULT  Goal: Maintains optimal cardiac output and hemodynamic stability  Description: INTERVENTIONS:  - Monitor vital signs, rhythm, and trends  - Monitor for bleeding, hypotension and signs of decreased cardiac output  - Evaluate effectiveness of vasoactive medications to optimize hemodynamic stability  - Monitor arterial and/or venous puncture sites for bleeding and/or hematoma  - Assess quality of pulses, skin color and temperature  - Assess for signs of decreased coronary artery perfusion - ex. Angina  - Evaluate  fluid balance, assess for edema, trend weights  Outcome: Progressing  Goal: Absence of cardiac arrhythmias or at baseline  Description: INTERVENTIONS:  - Continuous cardiac monitoring, monitor vital signs, obtain 12 lead EKG if indicated  - Evaluate effectiveness of antiarrhythmic and heart rate control medications as ordered  - Initiate emergency measures for life threatening arrhythmias  - Monitor electrolytes and administer replacement therapy as ordered  Outcome: Progressing     Problem: RESPIRATORY - ADULT  Goal: Achieves optimal ventilation and oxygenation  Description: INTERVENTIONS:  - Assess for changes in respiratory status  - Assess for changes in mentation and behavior  - Position to facilitate oxygenation and minimize respiratory effort  - Oxygen supplementation based on oxygen saturation or ABGs  - Provide Smoking Cessation handout, if applicable  - Encourage broncho-pulmonary hygiene including cough, deep breathe, Incentive Spirometry  - Assess the need for suctioning and perform as needed  - Assess and instruct to report SOB or any respiratory difficulty  - Respiratory Therapy support as indicated  - Manage/alleviate anxiety  - Monitor for signs/symptoms of CO2 retention  Outcome: Progressing     Problem: SKIN/TISSUE INTEGRITY - ADULT  Goal: Skin integrity remains intact  Description: INTERVENTIONS  - Assess and document risk factors for pressure ulcer development  - Assess and document skin integrity  - Monitor for areas of redness and/or skin breakdown  - Initiate interventions, skin care algorithm/standards of care as needed  Outcome: Progressing  Goal: Oral mucous membranes remain intact  Description: INTERVENTIONS  - Assess oral mucosa and hygiene practices  - Implement preventative oral hygiene regimen  - Implement oral medicated treatments as ordered  Outcome: Progressing

## 2024-02-28 NOTE — PROGRESS NOTES
[facility]     Hospitalist Progress Note     Freya Martinez Patient Status:  Inpatient    1955 MRN [id number]   Location [facility]W Attending Pb Mendieta M MD   Hosp Day # 2 PCP FILEMON STALEY MD     Subjective:     Patient sitting up in chair, NAD  Cough persists  SOB with minimal activity also persists  No cp, f,c,n,v abd pain or HA       Objective:    Review of Systems:   ROS completed; pertinent positive and negatives stated in subjective.      Vital signs:  Temp:  [97.8 °F (36.6 °C)-99 °F (37.2 °C)] 97.8 °F (36.6 °C)  Pulse:  [] 92  Resp:  [14-32] 16  BP: (123-139)/(66-75) 139/71  SpO2:  [58 %-100 %] 88 %      Physical Exam:    Gen: NAD AO x3  Chest: coarse BS b/l  CVS: normal s1 and s2 RR  Abd: NABS soft NT ND  Neuro: CN 2-12 grossly intact  Ext: no edema in bilateral LE      Diagnostic Data:    Labs:  Recent Labs   Lab 24  0707 24  0549 24  0529   WBC 15.5* 18.8* 15.2*   HGB 11.5* 12.1 12.3   MCV 80.5 79.8* 79.6*   .0 298.0 295.0       Recent Labs   Lab 24  0707 24  0549 24  0529   * 104* 114*   BUN 16 22 33*   CREATSERUM 0.85 0.85 0.96   CA 8.7 9.2 9.3   ALB 4.0 4.2 4.1    140 141   K 3.5 4.1 4.0    103 103   CO2 29.0 28.0 28.0   ALKPHO 104 109 106   AST 17 23 19   ALT 16 17 14   BILT 0.5 0.5 0.5   TP 7.8 8.2 8.1       Estimated Creatinine Clearance: 55.3 mL/min (based on SCr of 0.96 mg/dL).    No results for input(s): \"PTP\", \"INR\" in the last 168 hours.           Imaging: Imaging data reviewed in Epic.    Medications:    cefTRIAXone  2 g Intravenous Q24H    methylPREDNISolone  60 mg Intravenous Q8H    pantoprazole  40 mg Oral QAM AC    sulfamethoxazole-trimethoprim DS  1 tablet Oral Daily    ipratropium-albuterol  3 mL Nebulization 6 times per day    heparin  7,500 Units Subcutaneous Q8H LÓPEZ    lisinopril  20 mg Oral Daily    fluticasone furoate-vilanterol  1 puff Inhalation Daily       Assessment & Plan:      Acute on chronic respiratory failure 2/2 pulmonary sarcoidosis/ILD and COPD. Possible asthma exacerbation  CXR reviewed  CT chest negative for PE  BNP 18, Dimer 1.42, Procal negative  Pulm on consult  Cont Solumedrol, duonebs  ?atypical infection -> expanded resp panel neg  US guided thoracentesis -> hold for now  Echo pending  Hold diuresis at this time  Strict Is and Os, daily weights  Still requiring significant O2 at rest and even more with activity, goal >85% on O2  ELPIDIO  CPAP  Continue protocol  Morbid obesity  BMI 42  Counseled on making healthy lifestyle and dietary changes  HTN  BP well controlled  Continue home lisinopril and hydrochlorothiazide  Monitor vitals  Advanced care planning  Full code  Plan of care discussed with patient or family at bedside.      Supplementary Documentation:     Quality:  DVT Prophylaxis: Heparin s/c  CODE status: Full      Estimated date of discharge: TBD  Discharge is dependent on: clinical stability  At this point Ms. Martinez is expected to be discharge to: home      MDM: High

## 2024-02-29 NOTE — PLAN OF CARE
Problem: Patient Centered Care  Goal: Patient preferences are identified and integrated in the patient's plan of care  Description: Interventions:  - What would you like us to know as we care for you? I live at home with my .  - Provide timely, complete, and accurate information to patient/family  - Incorporate patient and family knowledge, values, beliefs, and cultural backgrounds into the planning and delivery of care  - Encourage patient/family to participate in care and decision-making at the level they choose  - Honor patient and family perspectives and choices  Outcome: Progressing     Problem: Patient/Family Goals  Goal: Patient/Family Long Term Goal  Description: Patient's Long Term Goal: To go home    Interventions:  - Titrate oxygen down  - Medication compliance  - Follow provider recommendations  - See additional Care Plan goals for specific interventions  Outcome: Progressing  Goal: Patient/Family Short Term Goal  Description: Patient's Short Term Goal: To breathe easier    Interventions:   - Oxygen therapy  - IV solumedrol  - Nebulizer treatments  - Further testing  - Follow provider recommendations  - See additional Care Plan goals for specific interventions  Outcome: Progressing     Problem: CARDIOVASCULAR - ADULT  Goal: Maintains optimal cardiac output and hemodynamic stability  Description: INTERVENTIONS:  - Monitor vital signs, rhythm, and trends  - Monitor for bleeding, hypotension and signs of decreased cardiac output  - Evaluate effectiveness of vasoactive medications to optimize hemodynamic stability  - Monitor arterial and/or venous puncture sites for bleeding and/or hematoma  - Assess quality of pulses, skin color and temperature  - Assess for signs of decreased coronary artery perfusion - ex. Angina  - Evaluate fluid balance, assess for edema, trend weights  Outcome: Progressing  Goal: Absence of cardiac arrhythmias or at baseline  Description: INTERVENTIONS:  - Continuous cardiac  monitoring, monitor vital signs, obtain 12 lead EKG if indicated  - Evaluate effectiveness of antiarrhythmic and heart rate control medications as ordered  - Initiate emergency measures for life threatening arrhythmias  - Monitor electrolytes and administer replacement therapy as ordered  Outcome: Progressing     Problem: RESPIRATORY - ADULT  Goal: Achieves optimal ventilation and oxygenation  Description: INTERVENTIONS:  - Assess for changes in respiratory status  - Assess for changes in mentation and behavior  - Position to facilitate oxygenation and minimize respiratory effort  - Oxygen supplementation based on oxygen saturation or ABGs  - Provide Smoking Cessation handout, if applicable  - Encourage broncho-pulmonary hygiene including cough, deep breathe, Incentive Spirometry  - Assess the need for suctioning and perform as needed  - Assess and instruct to report SOB or any respiratory difficulty  - Respiratory Therapy support as indicated  - Manage/alleviate anxiety  - Monitor for signs/symptoms of CO2 retention  Outcome: Progressing     Problem: SKIN/TISSUE INTEGRITY - ADULT  Goal: Skin integrity remains intact  Description: INTERVENTIONS  - Assess and document risk factors for pressure ulcer development  - Assess and document skin integrity  - Monitor for areas of redness and/or skin breakdown  - Initiate interventions, skin care algorithm/standards of care as needed  Outcome: Progressing  Goal: Oral mucous membranes remain intact  Description: INTERVENTIONS  - Assess oral mucosa and hygiene practices  - Implement preventative oral hygiene regimen  - Implement oral medicated treatments as ordered  Outcome: Progressing    No acute changes overnight, vital signs being monitored, frequent rounding by nursing staff, appropriate safety precautions in place, call light within reach.

## 2024-02-29 NOTE — PROGRESS NOTES
[facility]  part of [facility]    Progress Note      Assessment and Plan:   1.  Diffuse lung injury-the patient has a sedimentation rate of greater than 130 with a negative ANCA panel but with positive LASHAWN antibodies of 22.  The patient has a history of sarcoidosis but this does not typically give the high sedimentation rate.  The patient is currently on high-dose steroids.  Feeling a little better.    Recommendations:  1.  Continue steroid  2.  Nebulizer  3.  Await rheumatology opinion  4.  Await double-stranded DNA antibody   5.  Breo    2.  DVT prophylaxis-subcutaneous heparin  3.  Obstructive sleep apnea-PAP therapy nightly.      Subjective:   Freya Martinez is a(n) 68 year old female who is breathing a little better    Objective:   Blood pressure 142/62, pulse 113, temperature 98 °F (36.7 °C), temperature source Oral, resp. rate 24, weight 288 lb 12.8 oz (131 kg), SpO2 (!) 87%, not currently breastfeeding.    Physical Exam alert black female  HEENT examination is unremarkable with pupils equal round and reactive to light and accommodation.   Neck without adenopathy, thyromegaly, JVD nor bruit.   Lungs diminished with couple scattered crackles to auscultation and percussion.  Cardiac regular rate and rhythm no murmur.   Abdomen nontender, without hepatosplenomegaly and no mass appreciable.   Extremities without clubbing cyanosis nor edema.   Neurologic grossly intact with symmetric tone and strength and reflex.  Skin without gross abnormality     Results:     Lab Results   Component Value Date    WBC 15.9 02/29/2024    HGB 11.5 02/29/2024    HCT 38.2 02/29/2024    .0 02/29/2024    CREATSERUM 1.02 02/29/2024    BUN 34 02/29/2024     02/29/2024    K 4.6 02/29/2024     02/29/2024    CO2 32.0 02/29/2024     02/29/2024    CA 9.2 02/29/2024    ALB 3.9 02/29/2024    MG 2.4 02/29/2024    PHOS 4.7 02/29/2024       Robert Rawls MD  Medical Director, Critical Care,  [facility]  Medical Director, [facility]  Pager: [phone]

## 2024-02-29 NOTE — PLAN OF CARE
Pt a&o x4, 10L HFNC, on tele, self care. Oxygen desaturation with ambulation. Safety precautions maintained. Call light in reach.      Problem: Patient Centered Care  Goal: Patient preferences are identified and integrated in the patient's plan of care  Description: Interventions:  - What would you like us to know as we care for you? I live at home with my .  - Provide timely, complete, and accurate information to patient/family  - Incorporate patient and family knowledge, values, beliefs, and cultural backgrounds into the planning and delivery of care  - Encourage patient/family to participate in care and decision-making at the level they choose  - Honor patient and family perspectives and choices  Outcome: Progressing     Problem: Patient/Family Goals  Goal: Patient/Family Long Term Goal  Description: Patient's Long Term Goal: To go home    Interventions:  - Titrate oxygen down  - Medication compliance  - Follow provider recommendations  - See additional Care Plan goals for specific interventions  Outcome: Progressing  Goal: Patient/Family Short Term Goal  Description: Patient's Short Term Goal: To breathe easier    Interventions:   - Oxygen therapy  - IV solumedrol  - Nebulizer treatments  - Further testing  - Follow provider recommendations  - See additional Care Plan goals for specific interventions  Outcome: Progressing     Problem: CARDIOVASCULAR - ADULT  Goal: Maintains optimal cardiac output and hemodynamic stability  Description: INTERVENTIONS:  - Monitor vital signs, rhythm, and trends  - Monitor for bleeding, hypotension and signs of decreased cardiac output  - Evaluate effectiveness of vasoactive medications to optimize hemodynamic stability  - Monitor arterial and/or venous puncture sites for bleeding and/or hematoma  - Assess quality of pulses, skin color and temperature  - Assess for signs of decreased coronary artery perfusion - ex. Angina  - Evaluate fluid balance, assess for edema, trend  weights  Outcome: Progressing  Goal: Absence of cardiac arrhythmias or at baseline  Description: INTERVENTIONS:  - Continuous cardiac monitoring, monitor vital signs, obtain 12 lead EKG if indicated  - Evaluate effectiveness of antiarrhythmic and heart rate control medications as ordered  - Initiate emergency measures for life threatening arrhythmias  - Monitor electrolytes and administer replacement therapy as ordered  Outcome: Progressing     Problem: RESPIRATORY - ADULT  Goal: Achieves optimal ventilation and oxygenation  Description: INTERVENTIONS:  - Assess for changes in respiratory status  - Assess for changes in mentation and behavior  - Position to facilitate oxygenation and minimize respiratory effort  - Oxygen supplementation based on oxygen saturation or ABGs  - Provide Smoking Cessation handout, if applicable  - Encourage broncho-pulmonary hygiene including cough, deep breathe, Incentive Spirometry  - Assess the need for suctioning and perform as needed  - Assess and instruct to report SOB or any respiratory difficulty  - Respiratory Therapy support as indicated  - Manage/alleviate anxiety  - Monitor for signs/symptoms of CO2 retention  Outcome: Progressing     Problem: SKIN/TISSUE INTEGRITY - ADULT  Goal: Skin integrity remains intact  Description: INTERVENTIONS  - Assess and document risk factors for pressure ulcer development  - Assess and document skin integrity  - Monitor for areas of redness and/or skin breakdown  - Initiate interventions, skin care algorithm/standards of care as needed  Outcome: Progressing  Goal: Oral mucous membranes remain intact  Description: INTERVENTIONS  - Assess oral mucosa and hygiene practices  - Implement preventative oral hygiene regimen  - Implement oral medicated treatments as ordered  Outcome: Progressing

## 2024-02-29 NOTE — PROGRESS NOTES
RHEUMATOLOGY HOSPITAL FOLLOW-UP    Freya Martinez is a 68 year old female.      Reason for Consult: Sarcoidosis/ILD (biopsy-proven)     Medication History:  Patient sarcoidosis treated with corticosteroids with occasional corticosteroid bursts for flares.  She has never been on DMARD/Biologics.    ASSESSMENT/PLAN:     # Acute hypoxic respiratory failure  # Sarcoidosis/ILD (biopsy-proven)  -Patient originally diagnosed with pulmonary sarcoidosis via lung biopsy/bronch? in the 1990s at [facility].  Originally, patient treated with corticosteroids with occasional burst with flares.  #Small to Moderate L Pleural Effusion  #RLL 8 mm Nodule     Chronic:  # Asthma  # ELPIDIO on CPAP with poor compliance  # CHF  # HTN     DISCUSSION:  Although patient subjectively feeling improved, she is still requiring HFNC 10 L - 15 L.  Agree with IV Solu-Medrol 60 mg every 8 hours.  Will follow-up remaining autoimmune serologies.     PLAN:  -IV Solu-Medrol 60 mg every 8 hours       - S/p IV Solu-Medrol 125 mg x 1 on 2/26, IV Solu-Medrol 40 mg on 2/26 evening and 2/27 AM  -Follow-up autoimmune serologies, as below     Thank you for asking us to see this nice patient and participate in their care. We will make further recommendations as their case develops.    Digna Cloud,   2/29/2024   8:40 AM    HPI:     Chief Complaint   Patient presents with    Asthma     No acute events overnight, patient afebrile  Patient states that she is feeling overall better, however, requiring 10 L - 15 L HFNC    HISTORY:  Past Medical History:   Diagnosis Date    Ascariosis     Asthma (HCC)     Hypertension     Sleep apnea       Social Hx Reviewed   Family Hx Reviewed     Medications (Active prior to today's visit):  No current outpatient medications on file.     .cmed  Allergies:  No Known Allergies      ROS:   Review of Systems   Constitutional:  Negative for chills and fever.   HENT:  Negative for congestion, hearing loss, mouth sores, nosebleeds and trouble  swallowing.    Eyes:  Negative for photophobia, pain, redness and visual disturbance.   Respiratory:  Positive for cough and shortness of breath.    Cardiovascular:  Negative for chest pain, palpitations and leg swelling.   Gastrointestinal:  Negative for abdominal pain, blood in stool, diarrhea and nausea.   Endocrine: Negative for cold intolerance and heat intolerance.   Genitourinary:  Negative for dysuria, frequency and hematuria.   Musculoskeletal:  Positive for arthralgias (minor, occasional knee pain). Negative for back pain, gait problem, joint swelling, myalgias, neck pain and neck stiffness.   Skin:  Negative for color change and rash.   Neurological:  Negative for dizziness, weakness, numbness and headaches.   Psychiatric/Behavioral:  Negative for confusion and dysphoric mood.      PHYSICAL EXAM:     Vitals:    02/29/24 0751   BP: 139/64   Pulse: 93   Resp: 22   Temp: 97.9 °F (36.6 °C)     HEENT: Clear oropharynx, no oral ulcers, EOM intact, clear sclear, PERRLA, pleasant, no acute distress, no CAD,   No rashes  CVS: RRR, no murmurs  RS: On HFNC 10 L, crackles especially of the upper lobes bilaterally, no wheezing, no rhonchi  ABD: Soft Non tender, no HSM felt, BS positive  Joint exam:   no neck tendnerness, good ROM,   EXTREMITIES: no cyanosis, clubbing or edema  NEURO: intact touch, 5/5 ue and le strength      RELEVANT PRIOR LABS:     2/13/2024:  ACE less than 15  CRP 4.3 (high), ESR 62 (high)  ANCA panel negative   (high)     2/12/2024:  Blood cultures x 2 negative x 5 days    RELEVANT INPATIENT LABS:     Labs ordered: ADELAIDE by IFA with reflex, APS panel, complements, dsDNA, CCP, TB/hepatitis panel     2/28/2024:  Respiratory viral panel negative     RF 11 WNL  ADELAIDE by LASHAWN positive, ADELAIDE by IFA ______ (in process)       ADELAIDE Specific Antibodies ______ (in process)  C3 179.6 (high), C4 40.3 (high)  DsDNA ______ (in process)  APS Panel ______ (in process)  CCP 1.4 WNL, RF 11 WNL    TB Testing ______  (in process)  Acute Hepatitis Panel Negative     CBC with leukocytosis WBC 15.2 (neutrophilic predominance), normal Hg,   CMP with normal CR 0.96, nonelevated LFTs, no gamma gap noted     2004:  Sputum culture: No WBC, 1+ GPC ___(preliminary)  ESR greater than 130   (high)     24:  D-dimer 1.42 (high)  BNP 18 WNL  Troponin 13 negative x 1  Pro-Jonh less than 0.04  Imagin2024 chest ultrasound:     FINDINGS:  There is a small left pleural effusion.  Thoracentesis was not performed by Dr. Mckeon since the amount of fluid was not that significant.               Impression   CONCLUSION:  1. Small left pleural effusion.  Thoracentesis was not performed by Dr. Mckeon.      24 CT Chest PE AORTA:       FINDINGS:  CARDIAC: The heart is within normal limits of size.  There are coronary artery calcifications (3:69).  No pericardial effusion.  MEDIASTINUM/CHRISTINE: No mass or enlarged adenopathy.  There is been slight interval increase in size of a few nonenlarged mediastinal lymph nodes with a right paratracheal lymph node measuring 6 mm (3:32), previously 4 mm.  There is a 7 mm right lower hilar   lymph node, previously 5 mm.  LUNGS: There is been mild interval increase in the dense consolidation involving the left lower lobe.  There is opacification of multiple segmental and subsegmental bronchi involving the left lower lobe.  There has been interval increase in the  multifocal ground-glass opacities throughout both lungs (3:71).  There is unchanged multifocal bronchiectasis, which is most notable in the bilateral upper lobes (3:39).  The previously demonstrated consolidations involving the bilateral upper lobes,  lingula, refer periphery of the right lower lobe, and right middle lobe are again seen.  No evidence of pulmonary edema.  The previously described pulmonary nodules not well seen on this study.  VASCULATURE: The main pulmonary artery measures 3.0 cm.  No acute pulmonary embolism  through the segmental pulmonary arteries.  THORACIC AORTA: The ascending aorta is normal in caliber.  There is a 2 vessel aortic arch.  Mild atherosclerotic calcification of the aortic arch.  No dissection.  PLEURA: There is a moderate left pleural effusion (6:119), which has increased in size when compared to 02/13/2024.  No right pleural effusion.  No pneumothorax.  CHEST WALL: No axillary mass or enlarged adenopathy.    LIMITED ABDOMEN: Gallbladder is surgically absent.  BONES: No acute fracture.  No aggressive osseous lesion.  There are multilevel degenerative changes of the thoracic spine.  There is an 11 mm right glenoid subchondral cyst (3:17).  Mild right greater than left glenohumeral joint osteoarthrosis.  OTHER: Negative.                 Impression   CONCLUSION:     1. No acute pulmonary embolism through the segmental level.  2. Multiple new ground-glass opacities throughout both lungs, which may reflect multifocal pneumonia, alveolar sarcoidosis, or less likely other etiologies.  3. Mild interval increase in the left lower lobe consolidation, which is concerning for pneumonia.  There is again seen opacification of multiple left lower lobe segmental and subsegmental bronchi, which may reflect mucous plugging.  4. Enlarging moderate left pleural effusion.  5. Redemonstrated upper lobe predominant bronchiectasis with fibrosis and multifocal nodular consolidations involving both lungs, which were present on the prior CT chest dated 02/13/2024 and may be secondary to sarcoidosis, or other  infectious/inflammatory etiologies.  6. Coronary artery calcifications.  7. Lesser incidental findings described above.        2/26/24 CXR:  FINDINGS:  CARDIAC/VASC: Borderline cardiomegaly.  MEDIAST/CHRISTINE:   Atherosclerotic aorta with no visible aneurysm.    LUNGS/PLEURA: Extensive bilateral mixed alveolar and interstitial multifocal airspace opacification with bibasilar pleural reaction and peripheral pleural reaction left  mid hemithorax that has progressed  BONES: Mild scoliosis with mild degenerative disc disease and spondylosis.    OTHER: Negative.                 Impression   CONCLUSION:  1. Borderline cardiomegaly .  Atherosclerotic aorta.  2. Extensive bilateral mixed alveolar and interstitial multifocal airspace disease with slight progression.  3. Slight progression of bilateral pleural reaction worse on the left at the bases      2/13/24 HRCT:  FINDINGS:  CARDIAC: No enlargement, pericardial thickening.  There are moderate coronary artery calcifications.  MEDIASTINUM/CHRISTINE: No mass or enlarged adenopathy.    LUNGS/PLEURA: Central airways are patent.  There is consolidation with air bronchograms in the left lower lobe.  There is multifocal alveolar opacity, which is in a predominantly peribronchial distribution, throughout both lungs.  Some areas of opacity  are somewhat nodular in appearance.  For example, there is an 8 millimeter nodule in the right lower lobe laterally (series 4, image 60).     There is a small to moderate left pleural effusion     VASCULATURE: Main pulmonary artery is not enlarged.  There is a right sided aortic arch, without ascending thoracic aortic aneurysm.    THORACIC AORTA: Normal size for age.  No aneurysm.    CHEST WALL: No mass or enlarged adenopathy.    LIMITED ABDOMEN: Limited images of the upper abdomen are unremarkable.    BONES: There is multilevel degenerative disease of the spine.  OTHER: UnremarkableThere are flowing ventral osteophytes at multiple consecutive levels, compatible with diffuse idiopathic skeletal hyperostosis (DISH).     HRCT:   No groundglass changes to suggest air-trapping.  No bronchiectasis or bronchiolectasis.  No interstitial lung changes or architectural distortion.                 Impression   CONCLUSION:  1. HRCT demonstrates extensive bilateral multifocal airspace opacities, predominantly peribronchial in distribution.  Finding is thought to relate to underlying  sarcoidosis.  Areas of multifocal more confluent opacity, including at the left lung base may   be secondary to the same process.  Superimposed pneumonia or developing chronic organizing pneumonia are differential considerations.  Attention on follow-up CT chest in 3 to 6 months.       2. Exam also demonstrates 8 millimeter nodule in the right lower lobe, thought to relate to underlying sarcoidosis, with other etiologies within the differential at this time..  Recommend attention on follow-up CT chest.     3. Small to moderate left pleural effusion.  Nonspecific , but can also be secondary to sarcoidosis.

## 2024-02-29 NOTE — PROGRESS NOTES
[facility]     Hospitalist Progress Note     Freya Martinez Patient Status:  Inpatient    1955 MRN [id number]   Location [facility]5W Attending Pb Mendieta M MD   Hosp Day # 3 PCP FILEMON STALEY MD     Subjective:     Patient sitting up in chair, NAD  Breathing improved currently, earlier she was SOB  No cp, f,c,n,v abd pain or HA       Objective:    Review of Systems:   ROS completed; pertinent positive and negatives stated in subjective.      Vital signs:  Temp:  [97.9 °F (36.6 °C)-98.6 °F (37 °C)] 98 °F (36.7 °C)  Pulse:  [] 113  Resp:  [20-26] 26  BP: (109-143)/(54-68) 142/62  SpO2:  [80 %-92 %] 87 %      Physical Exam:    Gen: NAD AO x3  Chest: coarse BS b/l  CVS: normal s1 and s2 RR  Abd: NABS soft NT ND  Neuro: CN 2-12 grossly intact  Ext: no edema in bilateral LE      Diagnostic Data:    Labs:  Recent Labs   Lab 24  0707 24  0549 24  0529 24  0528   WBC 15.5* 18.8* 15.2* 15.9*   HGB 11.5* 12.1 12.3 11.5*   MCV 80.5 79.8* 79.6* 80.4   .0 298.0 295.0 302.0       Recent Labs   Lab 24  0707 24  0549 24  0529 24  0528   * 104* 114* 112*   BUN 16 22 33* 34*   CREATSERUM 0.85 0.85 0.96 1.02   CA 8.7 9.2 9.3 9.2   ALB 4.0 4.2 4.1 3.9    140 141 139   K 3.5 4.1 4.0 4.6    103 103 102   CO2 29.0 28.0 28.0 32.0   ALKPHO 104 109 106  --    AST 17 23 19  --    ALT 16 17 14  --    BILT 0.5 0.5 0.5  --    TP 7.8 8.2 8.1  --        Estimated Creatinine Clearance: 52 mL/min (based on SCr of 1.02 mg/dL).    No results for input(s): \"PTP\", \"INR\" in the last 168 hours.           Imaging: Imaging data reviewed in Epic.    Medications:    fluticasone propionate  1 spray Each Nare Daily    cefTRIAXone  2 g Intravenous Q24H    methylPREDNISolone  60 mg Intravenous Q8H    pantoprazole  40 mg Oral QAM AC    sulfamethoxazole-trimethoprim DS  1 tablet Oral Daily    ipratropium-albuterol  3 mL Nebulization 6 times per day     heparin  7,500 Units Subcutaneous Q8H [facility]    lisinopril  20 mg Oral Daily    fluticasone furoate-vilanterol  1 puff Inhalation Daily       Assessment & Plan:     Acute on chronic respiratory failure 2/2 pulmonary sarcoidosis/ILD and COPD. Possible asthma exacerbation  CXR reviewed  CT chest negative for PE  BNP 18, Dimer 1.42, Procal negative  Pulm and Rheum on consult   Cont Solumedrol, duonebs  ?atypical infection -> expanded resp panel neg  US guided thoracentesis -> hold for now  Echo with preserved EF and no WMA  Hold diuresis at this time  Strict Is and Os, daily weights  Still requiring significant O2 at rest and even more with activity, goal >85% on O2  ELPIDIO  CPAP  Continue protocol  Morbid obesity  BMI 42  Counseled on making healthy lifestyle and dietary changes  HTN  BP well controlled  Continue home lisinopril and hydrochlorothiazide  Monitor vitals  Advanced care planning  Full code  Plan of care discussed with patient or family at bedside.      Supplementary Documentation:     Quality:  DVT Prophylaxis: Heparin s/c  CODE status: Full      Estimated date of discharge: TBD  Discharge is dependent on: clinical stability  At this point Ms. Martinez is expected to be discharge to: home      MDM: High

## 2024-03-01 ENCOUNTER — TELEPHONE (OUTPATIENT)
Dept: PULMONOLOGY | Facility: CLINIC | Age: 69
End: 2024-03-01

## 2024-03-01 NOTE — PROGRESS NOTES
[facility]     Hospitalist Progress Note     Freya Martinez Patient Status:  Inpatient    1955 MRN [id number]   Location [facility]5W Attending Pb Mendieta M MD   Hosp Day # 4 PCP FILEMON STALEY MD     Subjective:     Patient sitting up in chair, NAD  O2 requirements increasing, on high flow O2  No acute resp distress or cough   No cp, f,c,n,v abd pain or HA       Objective:    Review of Systems:   ROS completed; pertinent positive and negatives stated in subjective.      Vital signs:  Temp:  [97.5 °F (36.4 °C)-98.3 °F (36.8 °C)] 98.3 °F (36.8 °C)  Pulse:  [] 100  Resp:  [20-26] 26  BP: (119-148)/(57-70) 124/61  SpO2:  [74 %-92 %] 87 %  FiO2 (%):  [45 %] 45 %      Physical Exam:    Gen: NAD AO x3  Chest: coarse BS b/l  CVS: normal s1 and s2 RR  Abd: NABS soft NT ND  Neuro: CN 2-12 grossly intact  Ext: no edema in bilateral LE      Diagnostic Data:    Labs:  Recent Labs   Lab 24  0707 24  0549 24  0524  0528 24  0435   WBC 15.5* 18.8* 15.2* 15.9* 12.1*   HGB 11.5* 12.1 12.3 11.5* 11.3*   MCV 80.5 79.8* 79.6* 80.4 78.4*   .0 298.0 295.0 302.0 269.0       Recent Labs   Lab 24  0707 24  0549 24  0529 24  0528 24  0435   * 104* 114* 112* 106*   BUN 16 22 33* 34* 37*   CREATSERUM 0.85 0.85 0.96 1.02 0.89   CA 8.7 9.2 9.3 9.2 8.9   ALB 4.0 4.2 4.1 3.9 3.7    140 141 139 142   K 3.5 4.1 4.0 4.6 4.4    103 103 102 106   CO2 29.0 28.0 28.0 32.0 31.0   ALKPHO 104 109 106  --   --    AST 17 23 19  --   --    ALT 16 17 14  --   --    BILT 0.5 0.5 0.5  --   --    TP 7.8 8.2 8.1  --   --        Estimated Creatinine Clearance: 59.6 mL/min (based on SCr of 0.89 mg/dL).    No results for input(s): \"PTP\", \"INR\" in the last 168 hours.           Imaging: Imaging data reviewed in Epic.    Medications:    fluticasone propionate  1 spray Each Nare Daily    cefTRIAXone  2 g Intravenous Q24H    methylPREDNISolone   60 mg Intravenous Q8H    pantoprazole  40 mg Oral QAM AC    sulfamethoxazole-trimethoprim DS  1 tablet Oral Daily    ipratropium-albuterol  3 mL Nebulization 6 times per day    heparin  7,500 Units Subcutaneous Q8H L[facility]PEZ    lisinopril  20 mg Oral Daily    fluticasone furoate-vilanterol  1 puff Inhalation Daily       Assessment & Plan:     Acute on chronic respiratory failure 2/2 pulmonary sarcoidosis/ILD and COPD. Possible asthma exacerbation  CXR reviewed  CT chest negative for PE  BNP 18, Dimer 1.42, Procal negative  Pulm and Rheum on consult   Cont Solumedrol, duonebs  ?atypical infection -> expanded resp panel neg  US guided thoracentesis -> hold for now  Echo with preserved EF and no WMA  Hold diuresis at this time  Strict Is and Os, daily weights  Still requiring significant O2 at rest and even more with activity, goal >85% on O2, will transfer to ICU due to increasing o2 requirements   ELPIDIO  CPAP  Continue protocol  Morbid obesity  BMI 42  Counseled on making healthy lifestyle and dietary changes  HTN  BP well controlled  Continue home lisinopril and hydrochlorothiazide  Monitor vitals  Advanced care planning  Full code  Plan of care discussed with patient or family at bedside.      Supplementary Documentation:     Quality:  DVT Prophylaxis: Heparin s/c  CODE status: Full      Estimated date of discharge: TBD  Discharge is dependent on: clinical stability  At this point Ms. Martinez is expected to be discharge to: home      MDM: High

## 2024-03-01 NOTE — PROGRESS NOTES
I was called to bedside around 830 am for increased tachypnea and hypoxia, O2 saturations 78% on 15L. Pt complaining of increased dyspnea and fatigue today, worsening cough and thicker sputum secretions. Lungs coarse throughout. Diffuse inspiratory and expiratory wheezing. Pt just received nebulizer treatment. Started on Airvo. 50L 45%, saturations maintaining between 84-87%, when pt ambulated back to bed. Saturations 62% on Airvo, recovered to 84% after 15 minutes. Discussed with Dr. Mckeon, recommended ICU transfer for closer monitoring as pt has declined over the past 3 days. Discussed with team about transfer. I spent 60 minutes coordinating care and discussing with ICU team for transfer.

## 2024-03-01 NOTE — TELEPHONE ENCOUNTER
Found fax from [facility] with physician's certification. Fax inquires about polysomnogram. Called [facility] who informed that form is incorrect and correct one will be faxed over

## 2024-03-01 NOTE — PROGRESS NOTES
[facility]  part of [facility]    Progress Note    Freya Martinez Patient Status:  Inpatient    1955 MRN [id number]   Location [facility] 2W/SW Attending Pb Mendieta MD   Hosp Day # 4 PCP FILEMON STALEY MD     Subjective:     Patient transferred to ICU due to worsening shortness of breath  Now on 15 L of oxygen oxygen saturating around 85 to 89%.    Objective:   Blood pressure 152/61, pulse 105, temperature 98.3 °F (36.8 °C), temperature source Oral, resp. rate (!) 46, weight 288 lb 12.8 oz (131 kg), SpO2 92%, not currently breastfeeding.    GEN: AAOx3, NAD  HEENT: EOMI, PERRLA, no injection or icterus, oral mucosa moist, no oral lesions. No lymphadenopathy. No facial rash  CVS: RRR, no murmurs rubs or gallops. Equal 2+ distal pulses.   LUNGS: on high flow oxygen  ABDOMEN:  soft NT/ND, +BS, no HSM  SKIN: No rashes or skin lesions. No nail findings  MSK:  No swelling or synovitis on exam  NEURO: Cranial nerves II-XII intact grossly. 5/5 strength throughout in both upper and lower extremities, sensation intact.  PSYCH: normal mood      Results:   Lab Results   Component Value Date    WBC 12.1 (H) 2024    HGB 11.3 (L) 2024    HCT 35.2 2024    .0 2024    CREATSERUM 0.89 2024    BUN 37 (H) 2024     2024    K 4.4 2024     2024    CO2 31.0 2024     (H) 2024    CA 8.9 2024    ALB 3.7 2024    ALKPHO 106 2024    BILT 0.5 2024    TP 8.1 2024    AST 19 2024    ALT 14 2024    PTT 28.6 2024    TSH 1.790 2020    DDIMER 1.42 (H) 2024    ESRML >130 (H) 2024    CRP 4.30 (H) 2024    MG 2.6 2024    PHOS 4.7 2024    TROPHS 13 2024       XR CHEST AP PORTABLE  (CPT=71045)    Result Date: 3/1/2024  CONCLUSION:   No significant change in the multifocal airspace opacities throughout both lungs, which may reflect  multifocal pneumonia, progression of the underlying pulmonary sarcoidosis, or ARDS.  Unchanged left pleural effusion.  No pneumothorax.    Dictated by (CST): Leobardo Colon MD on 3/01/2024 at 2:23 PM     Finalized by (CST): Leobardo Colon MD on 3/01/2024 at 2:28 PM               Assessment & Plan:       Acute on chronic hypoxic respiratory failure  - Prior history of sarcoidosis where she was treated with corticosteroids in the past  - Now blood work showing a positive SSA and centromere.  She has no other symptoms of Sjogren's or scleroderma but recent CT findings are showing extensive GGO and upper lobe with fibrosis, these findings could be seen in connective tissue disease  - She remains on IV Solu-Medrol 60 mg every 8 hours, will change to 60 mg every 6 hrs   - Plan to discuss with pulmonology, inquiring if CellCept would help.  This would not help acutely though.  - also found to have a small to moderate left pleural effusion, pulmonology tried to do a thoracentesis but there was not enough fluid  - echocardiogram done 2/27 normal   - clinically worsening and requiring high flow oxygen    Positive ADELAIDE, SSA and centromere  - She has no other symptoms of Sjogren's or scleroderma.    Will continue to follow. Above d/w pulmonology     Tania Hoff MD  3/1/2024

## 2024-03-01 NOTE — PLAN OF CARE
Problem: Patient Centered Care  Goal: Patient preferences are identified and integrated in the patient's plan of care  Description: Interventions:  - What would you like us to know as we care for you? I live at home with my .  - Provide timely, complete, and accurate information to patient/family  - Incorporate patient and family knowledge, values, beliefs, and cultural backgrounds into the planning and delivery of care  - Encourage patient/family to participate in care and decision-making at the level they choose  - Honor patient and family perspectives and choices  Outcome: Progressing     Problem: Patient/Family Goals  Goal: Patient/Family Long Term Goal  Description: Patient's Long Term Goal: To go home    Interventions:  - Titrate oxygen down  - Medication compliance  - Follow provider recommendations  - See additional Care Plan goals for specific interventions  Outcome: Progressing  Goal: Patient/Family Short Term Goal  Description: Patient's Short Term Goal: To breathe easier    Interventions:   - Oxygen therapy  - IV solumedrol  - Nebulizer treatments  - Further testing  - Follow provider recommendations  - See additional Care Plan goals for specific interventions  Outcome: Progressing     Problem: CARDIOVASCULAR - ADULT  Goal: Maintains optimal cardiac output and hemodynamic stability  Description: INTERVENTIONS:  - Monitor vital signs, rhythm, and trends  - Monitor for bleeding, hypotension and signs of decreased cardiac output  - Evaluate effectiveness of vasoactive medications to optimize hemodynamic stability  - Monitor arterial and/or venous puncture sites for bleeding and/or hematoma  - Assess quality of pulses, skin color and temperature  - Assess for signs of decreased coronary artery perfusion - ex. Angina  - Evaluate fluid balance, assess for edema, trend weights  Outcome: Progressing  Goal: Absence of cardiac arrhythmias or at baseline  Description: INTERVENTIONS:  - Continuous cardiac  monitoring, monitor vital signs, obtain 12 lead EKG if indicated  - Evaluate effectiveness of antiarrhythmic and heart rate control medications as ordered  - Initiate emergency measures for life threatening arrhythmias  - Monitor electrolytes and administer replacement therapy as ordered  Outcome: Progressing     Problem: RESPIRATORY - ADULT  Goal: Achieves optimal ventilation and oxygenation  Description: INTERVENTIONS:  - Assess for changes in respiratory status  - Assess for changes in mentation and behavior  - Position to facilitate oxygenation and minimize respiratory effort  - Oxygen supplementation based on oxygen saturation or ABGs  - Provide Smoking Cessation handout, if applicable  - Encourage broncho-pulmonary hygiene including cough, deep breathe, Incentive Spirometry  - Assess the need for suctioning and perform as needed  - Assess and instruct to report SOB or any respiratory difficulty  - Respiratory Therapy support as indicated  - Manage/alleviate anxiety  - Monitor for signs/symptoms of CO2 retention  Outcome: Progressing     Problem: SKIN/TISSUE INTEGRITY - ADULT  Goal: Skin integrity remains intact  Description: INTERVENTIONS  - Assess and document risk factors for pressure ulcer development  - Assess and document skin integrity  - Monitor for areas of redness and/or skin breakdown  - Initiate interventions, skin care algorithm/standards of care as needed  Outcome: Progressing  Goal: Oral mucous membranes remain intact  Description: INTERVENTIONS  - Assess oral mucosa and hygiene practices  - Implement preventative oral hygiene regimen  - Implement oral medicated treatments as ordered  Outcome: Progressing

## 2024-03-01 NOTE — PROGRESS NOTES
Patient with increased dyspnea, fatigue, increased coughing, and thicker sputum. RT administered breathing treatment. Unable to maintain O2 saturations on 15L High flow Nasal Cannula. JOSE ANTONIO Schroeder called to bedside. Patient placed on Airvo. After 15 minutes unable to mantain O2 saturations. Orders received to transfer Pt to ICU.

## 2024-03-01 NOTE — PROGRESS NOTES
03/01/24 1035   Oxygen Utilization   O2 Device (S)  High flow/High humidity   O2 Flow Rate (L/min) (S)  50 L/min   FiO2 (%) (S)  45 %   SpO2 (!) 87 %

## 2024-03-01 NOTE — PROGRESS NOTES
03/01/24 1200   RT Standby Charting   Transport Y  (moved pt from pmu to icu, pt desating with airvo 50L 45% to 68% with moving from chair to bed)

## 2024-03-01 NOTE — PROGRESS NOTES
[facility]  part of [facility]    Progress Note    Freya Martinez Patient Status:  Inpatient    1955 MRN [id number]   Location [facility]5W Attending Pb Mendieta MD   Hosp Day # 4 PCP FILEMON STALEY MD         Subjective:     Constitutional:  Positive for fatigue.   HENT:  Negative for congestion.    Respiratory:  Positive for cough and shortness of breath.    Cardiovascular: Negative.    Gastrointestinal: Negative.    Neurological: Negative.    Psychiatric/Behavioral: Negative.       Patient was seen and examined  On 15 L of oxygen with O2 sat 85 to 87%  No hemoptysis  Occasional cough with no sputum  No fever  Dyspnea and fatigue  Overall not  doing well  Objective:   Blood pressure 124/61, pulse 100, temperature 98.3 °F (36.8 °C), temperature source Oral, resp. rate 26, weight 288 lb 12.8 oz (131 kg), SpO2 (!) 87%, not currently breastfeeding.  Physical Exam  Constitutional:       General: She is in acute distress.      Appearance: She is obese. She is ill-appearing.   HENT:      Head: Atraumatic.      Nose: Nose normal.      Mouth/Throat:      Mouth: Mucous membranes are moist.   Eyes:      General: No scleral icterus.  Cardiovascular:      Rate and Rhythm: Normal rate.      Heart sounds:      No gallop.   Pulmonary:      Effort: Respiratory distress present.      Breath sounds: No stridor. Wheezing and rales present. No rhonchi.   Abdominal:      General: Abdomen is flat. Bowel sounds are normal.      Palpations: Abdomen is soft.      Tenderness: There is no guarding.   Musculoskeletal:      Cervical back: Normal range of motion.      Right lower leg: No edema.      Left lower leg: No edema.   Skin:     General: Skin is dry.   Neurological:      General: No focal deficit present.      Mental Status: She is oriented to person, place, and time.         Results:   Lab Results   Component Value Date    WBC 12.1 (H) 2024    HGB 11.3 (L) 2024    HCT 35.2  03/01/2024    .0 03/01/2024    CREATSERUM 0.89 03/01/2024    BUN 37 (H) 03/01/2024     03/01/2024    K 4.4 03/01/2024     03/01/2024    CO2 31.0 03/01/2024     (H) 03/01/2024    CA 8.9 03/01/2024    ALB 3.7 03/01/2024    ALKPHO 106 02/28/2024    BILT 0.5 02/28/2024    TP 8.1 02/28/2024    AST 19 02/28/2024    ALT 14 02/28/2024    PTT 28.6 02/28/2024    TSH 1.790 05/12/2020    DDIMER 1.42 (H) 02/26/2024    ESRML >130 (H) 02/27/2024    CRP 4.30 (H) 02/13/2024    MG 2.6 03/01/2024    PHOS 4.7 03/01/2024    TROPHS 13 02/26/2024           Assessment & Plan:      1- acute on chronic respiratory failure with hypoxia :   CT with no PE with extensive bilateral GGO's and chronic apical changes  - progression of ILD / acute flare up and ALI     ? secondary to pulmonary sarcoidosis (Diagnosed with pulm sarcoidosis 1990  by bronchoscopy and biopsy at [place] )     Other autoimmune disease with acute on chronic ILD  Positive autoimmune serologies with positive ADELAIDE and LASHAWN and anticentromere antibody ?  Sjogren's or scleroderma or lupus    - underline asthma / ? Copd former smoker ( qquit 1990 )        Symptoms progressed  in the last 5 to 6 months.   Normal procalcitonin and normal BNP   Significant elevation in ESR      Normal ANCA   Low ACE -I level   + ADELAIDE , LASHAWN , positive lupus anticoagulantp and anticentromere antibody     Plan :  Remain on high-dose steroid  No improvement onset requiring high flow oxygen  Clinically worsens and no improvement  Moved patient to the unit for potential intubation if continued deteriorate  Possible Airvo or BiPAP       2-ELPIDIO  CPAP nightly /compliant     3-DVT prophylaxis  Heparin subcu     High risk and complex case .  D/w pt   D/w staff   Move to ICU   35 min cct             Diann Mckeon MD  3/1/2024

## 2024-03-02 NOTE — PROGRESS NOTES
[facility]  part of [facility]    Progress Note    Freya Martinez Patient Status:  Inpatient    1955 MRN [id number]   Location [facility] 2W/SW Attending Pb Mendieta MD   Hosp Day # 5 PCP FILEMON STALEY MD     Subjective:     Patient transferred to ICU due to worsening shortness of breath  On Arivo with 70% saturating better  Having some numbness in right 4th finger    Objective:   Blood pressure 148/64, pulse 96, temperature 97.2 °F (36.2 °C), temperature source Temporal, resp. rate (!) 37, weight 257 lb 11.5 oz (116.9 kg), SpO2 95%, not currently breastfeeding.    GEN: AAOx3, NAD  HEENT: EOMI, PERRLA, no injection or icterus, oral mucosa moist, no oral lesions. No lymphadenopathy. No facial rash  CVS: RRR, no murmurs rubs or gallops. Equal 2+ distal pulses.   LUNGS: on high flow oxygen  ABDOMEN:  soft NT/ND, +BS, no HSM  SKIN: No rashes or skin lesions. No nail findings  MSK:  No swelling or synovitis on exam  NEURO: Cranial nerves II-XII intact grossly. 5/5 strength throughout in both upper and lower extremities, sensation intact.  PSYCH: normal mood      Results:   Lab Results   Component Value Date    WBC 12.0 (H) 2024    HGB 11.4 (L) 2024    HCT 37.9 2024    .0 2024    CREATSERUM 0.85 2024    BUN 31 (H) 2024     2024    K 4.6 2024     2024    CO2 27.0 2024     (H) 2024    CA 9.1 2024    ALB 3.8 2024    ALKPHO 106 2024    BILT 0.5 2024    TP 8.1 2024    AST 19 2024    ALT 14 2024    PTT 28.6 2024    TSH 1.790 2020    DDIMER 1.42 (H) 2024    ESRML >130 (H) 2024    CRP 4.30 (H) 2024    MG 2.6 2024    PHOS 4.2 2024    TROPHS 13 2024       XR CHEST AP PORTABLE  (CPT=71045)    Result Date: 3/1/2024  CONCLUSION:   No significant change in the multifocal airspace opacities throughout both lungs,  which may reflect multifocal pneumonia, progression of the underlying pulmonary sarcoidosis, or ARDS.  Unchanged left pleural effusion.  No pneumothorax.    Dictated by (CST): Leobardo Colon MD on 3/01/2024 at 2:23 PM     Finalized by (CST): Leobardo Colon MD on 3/01/2024 at 2:28 PM               Assessment & Plan:       Acute on chronic hypoxic respiratory failure  - Prior history of sarcoidosis where she was treated with corticosteroids in the past  - Now blood work showing a positive SSA and centromere.  She has no other symptoms of Sjogren's or scleroderma but recent CT findings are showing extensive GGO and upper lobe with fibrosis, these findings could be seen in connective tissue disease  - She remains on IV Solu-Medrol 60 mg every 6 hours  - Plan to add Cellcept 500 mg twice a day.  Risks/benefits discussed with patient  - also found to have a small to moderate left pleural effusion, pulmonology tried to do a thoracentesis but there was not enough fluid  - echocardiogram done 2/27 normal     Positive ADELAIDE, SSA and centromere  - She has no other symptoms of Sjogren's or scleroderma.    Will continue to follow.     Tania Hoff MD  3/3/2024

## 2024-03-02 NOTE — RESPIRATORY THERAPY NOTE
Patient placed on BIPAP 12/5 50% due to increased WOB and hypoxia. Patient's sats dropped to low 80's. Patient is tolerating BIPAP well at this time. Will continue to monitor patient.

## 2024-03-02 NOTE — PROGRESS NOTES
[facility], [place]     Hospitalist Progress Note     Freya Martinez Patient Status:  Inpatient    1955 MRN [id number]   [facility] Attending bP Mendieta M MD   Hosp Day # 5 PCP FILEMON STALEY MD     Subjective:     Patient sitting up in chair  No acute events overnight  No acute resp distress or cough   Remains on hi flow 02  No cp, f,c,n,v abd pain or HA       Objective:    Review of Systems:   ROS completed; pertinent positive and negatives stated in subjective.      Vital signs:  Temp:  [97 °F (36.1 °C)-97.5 °F (36.4 °C)] 97.2 °F (36.2 °C)  Pulse:  [] 96  Resp:  [24-46] 37  BP: (138-178)/() 148/64  SpO2:  [72 %-100 %] 95 %  FiO2 (%):  [45 %-80 %] 70 %      Physical Exam:    Gen: NAD AO x3  Chest: coarse BS b/l  CVS: normal s1 and s2 RR  Abd: NABS soft NT ND  Neuro: CN 2-12 grossly intact  Ext: no edema in bilateral LE      Diagnostic Data:    Labs:  Recent Labs   Lab 24  0549 24  0529 24  0524  0435 24  0403   WBC 18.8* 15.2* 15.9* 12.1* 12.0*   HGB 12.1 12.3 11.5* 11.3* 11.4*   MCV 79.8* 79.6* 80.4 78.4* 79.0*   .0 295.0 302.0 269.0 254.0       Recent Labs   Lab 24  0707 24  0549 24  0529 24  0528 24  0435 24  0403   * 104* 114* 112* 106* 111*   BUN 16 22 33* 34* 37* 31*   CREATSERUM 0.85 0.85 0.96 1.02 0.89 0.85   CA 8.7 9.2 9.3 9.2 8.9 9.1   ALB 4.0 4.2 4.1 3.9 3.7 3.8    140 141 139 142 140   K 3.5 4.1 4.0 4.6 4.4 4.6    103 103 102 106 106   CO2 29.0 28.0 28.0 32.0 31.0 27.0   ALKPHO 104 109 106  --   --   --    AST 17 23 19  --   --   --    ALT 16 17 14  --   --   --    BILT 0.5 0.5 0.5  --   --   --    TP 7.8 8.2 8.1  --   --   --        Estimated Creatinine Clearance: 62.5 mL/min (based on SCr of 0.85 mg/dL).    Recent Labs   Lab 24  1458   PTP 14.2              Imaging: Imaging data reviewed in Epic.    Medications:    ipratropium-albuterol  3 mL  Nebulization 4 times per day    methylPREDNISolone  60 mg Intravenous Q6H    cefepime  1 g Intravenous Q8H    fluticasone propionate  1 spray Each Nare Daily    pantoprazole  40 mg Oral QAM AC    sulfamethoxazole-trimethoprim DS  1 tablet Oral Daily    heparin  7,500 Units Subcutaneous Q8H LÓPEZ    lisinopril  20 mg Oral Daily    fluticasone furoate-vilanterol  1 puff Inhalation Daily       Assessment & Plan:     Acute on chronic respiratory failure 2/2 pulmonary sarcoidosis/ILD and COPD. Possible asthma exacerbation  CXR reviewed  CT chest negative for PE  BNP 18, Dimer 1.42, Procal negative  Pulm and Rheum on consult   Cont Solumedrol, duonebs  ?atypical infection -> expanded resp panel neg  US guided thoracentesis -> hold for now  Echo with preserved EF and no WMA  Hold diuresis at this time  Strict Is and Os, daily weights  Cont to monitor in PCU on hi anette O2  ELPIDIO  CPAP  Continue protocol  Morbid obesity  BMI 42  Counseled on making healthy lifestyle and dietary changes  HTN  BP well controlled  Continue home lisinopril and hydrochlorothiazide  Monitor vitals  Advanced care planning  Full code  Plan of care discussed with patient or family at bedside.      Supplementary Documentation:     Quality:  DVT Prophylaxis: Heparin s/c  CODE status: Full      Estimated date of discharge: TBD  Discharge is dependent on: clinical stability  At this point Ms. Martinez is expected to be discharge to: home      MDM: High

## 2024-03-02 NOTE — PLAN OF CARE
Problem: RESPIRATORY - ADULT  Goal: Achieves optimal ventilation and oxygenation  Description: INTERVENTIONS:  - Assess for changes in respiratory status  - Assess for changes in mentation and behavior  - Position to facilitate oxygenation and minimize respiratory effort  - Oxygen supplementation based on oxygen saturation or ABGs  - Provide Smoking Cessation handout, if applicable  - Encourage broncho-pulmonary hygiene including cough, deep breathe, Incentive Spirometry  - Assess the need for suctioning and perform as needed  - Assess and instruct to report SOB or any respiratory difficulty  - Respiratory Therapy support as indicated  - Manage/alleviate anxiety  - Monitor for signs/symptoms of CO2 retention  Outcome: Progressing   Patient received on HHFNC 60L/80%. FiO2 reduced to 75% while maintaining appropriate SpO2 on monitor. Nebulized medications given as ordered. Plan to continue to wean oxygen therapy as tolerated. RT will continue to monitor

## 2024-03-02 NOTE — PLAN OF CARE
Pt remains alert and oriented overnight. Pt switched from continuous BiPAP to Airvo high humidity, high flow, tolerating well. Pt and family updated on plan of care and all questions answered at this time.     Problem: Patient Centered Care  Goal: Patient preferences are identified and integrated in the patient's plan of care  Description: Interventions:  - What would you like us to know as we care for you? I live at home with my .  - Provide timely, complete, and accurate information to patient/family  - Incorporate patient and family knowledge, values, beliefs, and cultural backgrounds into the planning and delivery of care  - Encourage patient/family to participate in care and decision-making at the level they choose  - Honor patient and family perspectives and choices  Outcome: Progressing     Problem: Patient/Family Goals  Goal: Patient/Family Long Term Goal  Description: Patient's Long Term Goal: To go home    Interventions:  - Titrate oxygen down  - Medication compliance  - Follow provider recommendations  - See additional Care Plan goals for specific interventions  Outcome: Progressing     Problem: CARDIOVASCULAR - ADULT  Goal: Maintains optimal cardiac output and hemodynamic stability  Description: INTERVENTIONS:  - Monitor vital signs, rhythm, and trends  - Monitor for bleeding, hypotension and signs of decreased cardiac output  - Evaluate effectiveness of vasoactive medications to optimize hemodynamic stability  - Monitor arterial and/or venous puncture sites for bleeding and/or hematoma  - Assess quality of pulses, skin color and temperature  - Assess for signs of decreased coronary artery perfusion - ex. Angina  - Evaluate fluid balance, assess for edema, trend weights  Outcome: Progressing  Goal: Absence of cardiac arrhythmias or at baseline  Description: INTERVENTIONS:  - Continuous cardiac monitoring, monitor vital signs, obtain 12 lead EKG if indicated  - Evaluate effectiveness of  antiarrhythmic and heart rate control medications as ordered  - Initiate emergency measures for life threatening arrhythmias  - Monitor electrolytes and administer replacement therapy as ordered  Outcome: Progressing     Problem: RESPIRATORY - ADULT  Goal: Achieves optimal ventilation and oxygenation  Description: INTERVENTIONS:  - Assess for changes in respiratory status  - Assess for changes in mentation and behavior  - Position to facilitate oxygenation and minimize respiratory effort  - Oxygen supplementation based on oxygen saturation or ABGs  - Provide Smoking Cessation handout, if applicable  - Encourage broncho-pulmonary hygiene including cough, deep breathe, Incentive Spirometry  - Assess the need for suctioning and perform as needed  - Assess and instruct to report SOB or any respiratory difficulty  - Respiratory Therapy support as indicated  - Manage/alleviate anxiety  - Monitor for signs/symptoms of CO2 retention  Outcome: Progressing     Problem: SKIN/TISSUE INTEGRITY - ADULT  Goal: Skin integrity remains intact  Description: INTERVENTIONS  - Assess and document risk factors for pressure ulcer development  - Assess and document skin integrity  - Monitor for areas of redness and/or skin breakdown  - Initiate interventions, skin care algorithm/standards of care as needed  Outcome: Progressing  Goal: Oral mucous membranes remain intact  Description: INTERVENTIONS  - Assess oral mucosa and hygiene practices  - Implement preventative oral hygiene regimen  - Implement oral medicated treatments as ordered  Outcome: Progressing     Problem: Patient/Family Goals  Goal: Patient/Family Short Term Goal  Description: Patient's Short Term Goal: To breathe easier    Interventions:   - Oxygen therapy  - IV solumedrol  - Nebulizer treatments  - Further testing  - Follow provider recommendations  - See additional Care Plan goals for specific interventions  Outcome: Not Progressing

## 2024-03-02 NOTE — PLAN OF CARE
Transferred to PCCU. Patient still desaturating on high flow high humidity oxgen. BI pap placed- 02 saturation much better but RR still elevated. Rheumatology consulted.          Problem: RESPIRATORY - ADULT  Goal: Achieves optimal ventilation and oxygenation  Description: INTERVENTIONS:  - Assess for changes in respiratory status  - Assess for changes in mentation and behavior  - Position to facilitate oxygenation and minimize respiratory effort  - Oxygen supplementation based on oxygen saturation or ABGs  - Provide Smoking Cessation handout, if applicable  - Encourage broncho-pulmonary hygiene including cough, deep breathe, Incentive Spirometry  - Assess the need for suctioning and perform as needed  - Assess and instruct to report SOB or any respiratory difficulty  - Respiratory Therapy support as indicated  - Manage/alleviate anxiety  - Monitor for signs/symptoms of CO2 retention  Outcome: Not Progressing

## 2024-03-02 NOTE — PROGRESS NOTES
[facility]  part of [facility]    Progress Note    Freya Martinez Patient Status:  Inpatient    1955 MRN [id number]   Location [facility] 2W/SW Attending Pb Mendieta MD   Hosp Day # 5 PCP FILEMON STALEY MD         Subjective:     Constitutional:  Positive for fatigue. Negative for fever.   HENT: Negative.     Respiratory:  Positive for cough and shortness of breath.    Cardiovascular: Negative.    Gastrointestinal: Negative.    Musculoskeletal: Negative.    Skin: Negative.    Neurological: Negative.    Hematological: Negative.    Psychiatric/Behavioral: Negative.       Claimed some improvement today  On Airvo with 70% FiO2 O2 sat 98%  Occasional cough with clear sputum with no hemoptysis  No fever or chills  No chest pain  No lower extremity edema or pain calf tenderness  Objective:   Blood pressure 148/64, pulse 96, temperature 97.2 °F (36.2 °C), temperature source Temporal, resp. rate (!) 37, weight 257 lb 11.5 oz (116.9 kg), SpO2 95%, not currently breastfeeding.  Physical Exam  Constitutional:       Appearance: She is ill-appearing.   HENT:      Head: Atraumatic.      Nose: Nose normal.      Mouth/Throat:      Mouth: Mucous membranes are moist.   Eyes:      General: No scleral icterus.  Cardiovascular:      Rate and Rhythm: Normal rate.      Heart sounds:      No gallop.   Pulmonary:      Effort: No respiratory distress.      Breath sounds: No stridor. Wheezing and rales present. No rhonchi.   Abdominal:      General: Abdomen is flat. Bowel sounds are normal.      Palpations: Abdomen is soft.   Musculoskeletal:      Cervical back: Normal range of motion. No rigidity.      Right lower leg: No edema.      Left lower leg: No edema.   Skin:     General: Skin is dry.   Neurological:      General: No focal deficit present.      Mental Status: She is oriented to person, place, and time.         Results:   Lab Results   Component Value Date    WBC 12.0 (H) 2024    HGB  11.4 (L) 03/02/2024    HCT 37.9 03/02/2024    .0 03/02/2024    CREATSERUM 0.85 03/02/2024    BUN 31 (H) 03/02/2024     03/02/2024    K 4.6 03/02/2024     03/02/2024    CO2 27.0 03/02/2024     (H) 03/02/2024    CA 9.1 03/02/2024    ALB 3.8 03/02/2024    ALKPHO 106 02/28/2024    BILT 0.5 02/28/2024    TP 8.1 02/28/2024    AST 19 02/28/2024    ALT 14 02/28/2024    PTT 28.6 02/28/2024    TSH 1.790 05/12/2020    DDIMER 1.42 (H) 02/26/2024    ESRML >130 (H) 02/27/2024    CRP 4.30 (H) 02/13/2024    MG 2.6 03/02/2024    PHOS 4.2 03/02/2024    TROPHS 13 02/26/2024       XR CHEST AP PORTABLE  (CPT=71045)    Result Date: 3/1/2024  CONCLUSION:   No significant change in the multifocal airspace opacities throughout both lungs, which may reflect multifocal pneumonia, progression of the underlying pulmonary sarcoidosis, or ARDS.  Unchanged left pleural effusion.  No pneumothorax.    Dictated by (CST): Leobardo Colon MD on 3/01/2024 at 2:23 PM     Finalized by (CST): Leobardo Colon MD on 3/01/2024 at 2:28 PM               Assessment & Plan:     1- acute on chronic respiratory failure with hypoxia :   CT with no PE with extensive bilateral GGO's and chronic apical changes  - progression of ILD / acute flare up and ALI      ? secondary to pulmonary sarcoidosis (Diagnosed with pulm sarcoidosis 1990  by bronchoscopy and biopsy at [place] )      Other autoimmune disease with acute on chronic ILD  Positive autoimmune serologies with positive ADELAIDE and LASHAWN and anticentromere antibody ?  Sjogren's or scleroderma or lupus     - underline asthma / ? Copd former smoker ( qquit 1990 )         Symptoms progressed  in the last 5 to 6 months.   Normal procalcitonin and normal BNP   Significant elevation in ESR      Normal ANCA   Low ACE -I level   + ADELAIDE , LASHAWN , positive lupus anticoagulantp and anticentromere antibody     Plan :  Remain on high-dose steroid  CellCept added on 3/1/2024  Continue with empiric  antibiotics  O2 therapy  Rheumatology following  Keep in the unit        2-ELPIDIO  CPAP nightly /compliant     3-DVT prophylaxis  Heparin subcu     High risk and complex case .              Diann Mckeon MD  3/2/2024

## 2024-03-03 NOTE — PLAN OF CARE
Pt remains alert and oriented overnight. Pt remains on Airvo 60L & 70%. Pt updated on plan of care and all questions answered at this time.         Problem: Patient Centered Care  Goal: Patient preferences are identified and integrated in the patient's plan of care  Description: Interventions:  - What would you like us to know as we care for you? I live at home with my .  - Provide timely, complete, and accurate information to patient/family  - Incorporate patient and family knowledge, values, beliefs, and cultural backgrounds into the planning and delivery of care  - Encourage patient/family to participate in care and decision-making at the level they choose  - Honor patient and family perspectives and choices  Outcome: Progressing     Problem: Patient/Family Goals  Goal: Patient/Family Long Term Goal  Description: Patient's Long Term Goal: To go home    Interventions:  - Titrate oxygen down  - Medication compliance  - Follow provider recommendations  - See additional Care Plan goals for specific interventions  Outcome: Progressing  Goal: Patient/Family Short Term Goal  Description: Patient's Short Term Goal: To breathe easier    Interventions:   - Oxygen therapy  - IV solumedrol  - Nebulizer treatments  - Further testing  - Follow provider recommendations  - See additional Care Plan goals for specific interventions  Outcome: Progressing     Problem: CARDIOVASCULAR - ADULT  Goal: Maintains optimal cardiac output and hemodynamic stability  Description: INTERVENTIONS:  - Monitor vital signs, rhythm, and trends  - Monitor for bleeding, hypotension and signs of decreased cardiac output  - Evaluate effectiveness of vasoactive medications to optimize hemodynamic stability  - Monitor arterial and/or venous puncture sites for bleeding and/or hematoma  - Assess quality of pulses, skin color and temperature  - Assess for signs of decreased coronary artery perfusion - ex. Angina  - Evaluate fluid balance, assess for  edema, trend weights  Outcome: Progressing  Goal: Absence of cardiac arrhythmias or at baseline  Description: INTERVENTIONS:  - Continuous cardiac monitoring, monitor vital signs, obtain 12 lead EKG if indicated  - Evaluate effectiveness of antiarrhythmic and heart rate control medications as ordered  - Initiate emergency measures for life threatening arrhythmias  - Monitor electrolytes and administer replacement therapy as ordered  Outcome: Progressing     Problem: SKIN/TISSUE INTEGRITY - ADULT  Goal: Skin integrity remains intact  Description: INTERVENTIONS  - Assess and document risk factors for pressure ulcer development  - Assess and document skin integrity  - Monitor for areas of redness and/or skin breakdown  - Initiate interventions, skin care algorithm/standards of care as needed  Outcome: Progressing  Goal: Oral mucous membranes remain intact  Description: INTERVENTIONS  - Assess oral mucosa and hygiene practices  - Implement preventative oral hygiene regimen  - Implement oral medicated treatments as ordered  Outcome: Progressing     Problem: RESPIRATORY - ADULT  Goal: Achieves optimal ventilation and oxygenation  Description: INTERVENTIONS:  - Assess for changes in respiratory status  - Assess for changes in mentation and behavior  - Position to facilitate oxygenation and minimize respiratory effort  - Oxygen supplementation based on oxygen saturation or ABGs  - Provide Smoking Cessation handout, if applicable  - Encourage broncho-pulmonary hygiene including cough, deep breathe, Incentive Spirometry  - Assess the need for suctioning and perform as needed  - Assess and instruct to report SOB or any respiratory difficulty  - Respiratory Therapy support as indicated  - Manage/alleviate anxiety  - Monitor for signs/symptoms of CO2 retention  Outcome: Not Progressing

## 2024-03-03 NOTE — PROGRESS NOTES
[facility]  part of [facility]    Progress Note    Freya Martinez Patient Status:  Inpatient    1955 MRN [id number]   Location [facility] 2W/SW Attending Pb Mendieta MD   Hosp Day # 6 PCP FILEMON STALEY MD         Subjective:     Constitutional:  Positive for fatigue. Negative for fever.   HENT:  Negative for congestion.    Respiratory:  Positive for cough, shortness of breath and wheezing.    Cardiovascular:  Negative for chest pain and leg swelling.   Gastrointestinal: Negative.    Skin: Negative.    Neurological: Negative.    Hematological: Negative.    Psychiatric/Behavioral: Negative.       Patient was seen and examined   had a bowel movement earlier  Pain some improvement  Cough with clear sputum with no hemoptysis  On Airvo  No fever or chills  Objective:   Blood pressure 143/71, pulse 87, temperature 97.8 °F (36.6 °C), temperature source Temporal, resp. rate (!) 33, weight 228 lb 6.3 oz (103.6 kg), SpO2 (!) 88%, not currently breastfeeding.  Physical Exam  Constitutional:       General: She is in acute distress.      Appearance: She is obese. She is ill-appearing.   HENT:      Head: Atraumatic.      Nose: Nose normal.      Mouth/Throat:      Mouth: Mucous membranes are moist.   Eyes:      General: No scleral icterus.  Cardiovascular:      Rate and Rhythm: Normal rate.      Heart sounds:      No gallop.   Pulmonary:      Effort: Respiratory distress present.      Breath sounds: No stridor. Wheezing and rales present. No rhonchi.   Abdominal:      General: Abdomen is flat. Bowel sounds are normal.      Palpations: Abdomen is soft.      Tenderness: There is no guarding.   Musculoskeletal:      Cervical back: Normal range of motion. No rigidity.      Right lower leg: No edema.      Left lower leg: No edema.   Skin:     General: Skin is dry.   Neurological:      Mental Status: She is oriented to person, place, and time. Mental status is at baseline.         Results:    Lab Results   Component Value Date    WBC 11.1 (H) 03/03/2024    HGB 11.2 (L) 03/03/2024    HCT 36.7 03/03/2024    .0 03/03/2024    CREATSERUM 0.91 03/03/2024    BUN 35 (H) 03/03/2024     03/03/2024    K 4.8 03/03/2024     03/03/2024    CO2 29.0 03/03/2024     (H) 03/03/2024    CA 8.9 03/03/2024    ALB 3.7 03/03/2024    ALKPHO 106 02/28/2024    BILT 0.5 02/28/2024    TP 8.1 02/28/2024    AST 19 02/28/2024    ALT 14 02/28/2024    PTT 28.6 02/28/2024    TSH 1.790 05/12/2020    DDIMER 1.42 (H) 02/26/2024    ESRML >130 (H) 02/27/2024    CRP 4.30 (H) 02/13/2024    MG 2.6 03/02/2024    PHOS 4.0 03/03/2024    TROPHS 13 02/26/2024       XR CHEST AP PORTABLE  (CPT=71045)    Result Date: 3/1/2024  CONCLUSION:   No significant change in the multifocal airspace opacities throughout both lungs, which may reflect multifocal pneumonia, progression of the underlying pulmonary sarcoidosis, or ARDS.  Unchanged left pleural effusion.  No pneumothorax.    Dictated by (CST): Leobardo Colon MD on 3/01/2024 at 2:23 PM     Finalized by (CST): Leobardo Colon MD on 3/01/2024 at 2:28 PM               Assessment & Plan:      1- acute on chronic respiratory failure with hypoxia :   CT with no PE with extensive bilateral GGO's and chronic apical changes  - progression of ILD / acute flare up and ALI      ? secondary to pulmonary sarcoidosis (Diagnosed with pulm sarcoidosis 1990  by bronchoscopy and biopsy at [place] )      Other autoimmune disease with acute on chronic ILD  Positive autoimmune serologies with positive ADELAIDE and LASHAWN and anticentromere antibody ?  Sjogren's or scleroderma or lupus     - underline asthma / ? Copd former smoker ( qquit 1990 )         Symptoms progressed  in the last 5 to 6 months.   Normal procalcitonin and normal BNP   Significant elevation in ESR      Normal ANCA   Low ACE -I level   + ADELAIDE , LASHAWN , positive lupus anticoagulantp and anticentromere antibody     Plan :  Remain on high-dose  steroid  CellCept added on 3/1/2024  Continue with empiric antibiotics  O2 therapy  Rheumatology following  Keep in the unit        2-ELPIDIO  CPAP nightly /compliant     3-DVT prophylaxis  Heparin subcu     High risk and complex case .  D/w staff               Diann Mckeon MD  3/3/2024

## 2024-03-03 NOTE — PLAN OF CARE
Patient reports slow/ minimal improvement in breathing. She is still desaturating for brief periods of time but no new symptoms during desaturation. 02 Saturation levels recover without any increase in oxygen/flow. Patient still coughing up clear/tan moderately thick secretions- able to self section. Suction equipment at bedside.    Due to her advanced chronic lung disease,  goal for 02 saturations is to maintain saturation > 85%. Goal also to avoid a decrease in patient's respiratory drive - adjusting oxygen administration/flow to keep saturations 85%- 92%.     Freya's resipiratory rate also fluctuates significantly with and without exertion. She does not report any significant increase in feeling short of breath. Coaching/education provided on relaxing and deep breathing to optimize oxygenation.     This nurse + respiratory therapy able to ween patient off high flow high humidity oxygen delivery/ administration. Patient now on regular high flow oxygen with humidifier. Started on 15 Liters and now weened down to 12 Liters.     Patient up to the chair today with stand by assist. Patient tolerated activity ok with expected dyspea on exertion. Only minimal activity today, transferring to the chair. Patient able to turn/ reposition herself.     Medium soft bowel movement today on bedside commode. Urine episode with stool occurrence. Purwick in place while in bed/chair.       Problem: RESPIRATORY - ADULT  Goal: Achieves optimal ventilation and oxygenation  Description: INTERVENTIONS:  - Assess for changes in respiratory status  - Assess for changes in mentation and behavior  - Position to facilitate oxygenation and minimize respiratory effort  - Oxygen supplementation based on oxygen saturation or ABGs  - Provide Smoking Cessation handout, if applicable  - Encourage broncho-pulmonary hygiene including cough, deep breathe, Incentive Spirometry  - Assess the need for suctioning and perform as needed  - Assess and  instruct to report SOB or any respiratory difficulty  - Respiratory Therapy support as indicated  - Manage/alleviate anxiety  - Monitor for signs/symptoms of CO2 retention  Outcome: Progressing

## 2024-03-03 NOTE — RESPIRATORY THERAPY NOTE
Patient was changed to High Flow regular nasal canula 15 L. So2 96%, by MD Mckeon Verbal orders. MAGEN Fernandez called me to changed patient to HFNC 15 L. AIRVO is S/B.

## 2024-03-03 NOTE — PROGRESS NOTES
[facility]     Hospitalist Progress Note     Freya Martinez Patient Status:  Inpatient    1955 MRN [id number]   Location [facility]5W Attending Pb Mendieta M MD   Hosp Day # 6 PCP FILEMON STALEY MD     Subjective:     Patient sitting up in chair  No acute events overnight  On hi flow o2 nc  No acute resp distress or cough   No cp, f,c,n,v abd pain or HA       Objective:    Review of Systems:   ROS completed; pertinent positive and negatives stated in subjective.      Vital signs:  Temp:  [96.6 °F (35.9 °C)-97.9 °F (36.6 °C)] 97.8 °F (36.6 °C)  Pulse:  [75-98] 87  Resp:  [29-42] 33  BP: (135-155)/(61-72) 143/71  SpO2:  [88 %-96 %] 88 %  FiO2 (%):  [50 %-75 %] 65 %      Physical Exam:    Gen: NAD AO x3  Chest: coarse BS b/l  CVS: normal s1 and s2 RR  Abd: NABS soft NT ND  Neuro: CN 2-12 grossly intact  Ext: no edema in bilateral LE      Diagnostic Data:    Labs:  Recent Labs   Lab 24  0529 24  0528 24  0435 24  0403 24  0338   WBC 15.2* 15.9* 12.1* 12.0* 11.1*   HGB 12.3 11.5* 11.3* 11.4* 11.2*   MCV 79.6* 80.4 78.4* 79.0* 78.4*   .0 302.0 269.0 254.0 255.0       Recent Labs   Lab 24  0707 24  0549 24  0529 24  0528 24  0435 24  0403 24  0338   * 104* 114*   < > 106* 111* 113*   BUN 16 22 33*   < > 37* 31* 35*   CREATSERUM 0.85 0.85 0.96   < > 0.89 0.85 0.91   CA 8.7 9.2 9.3   < > 8.9 9.1 8.9   ALB 4.0 4.2 4.1   < > 3.7 3.8 3.7    140 141   < > 142 140 140   K 3.5 4.1 4.0   < > 4.4 4.6 4.8    103 103   < > 106 106 104   CO2 29.0 28.0 28.0   < > 31.0 27.0 29.0   ALKPHO 104 109 106  --   --   --   --    AST 17 23 19  --   --   --   --    ALT 16 17 14  --   --   --   --    BILT 0.5 0.5 0.5  --   --   --   --    TP 7.8 8.2 8.1  --   --   --   --     < > = values in this interval not displayed.       Estimated Creatinine Clearance: 58.3 mL/min (based on SCr of 0.91 mg/dL).    Recent Labs   Lab  02/28/24  1458   PTP 14.2              Imaging: Imaging data reviewed in Epic.    Medications:    mycophenolate sodium  360 mg Oral BID AC    ipratropium-albuterol  3 mL Nebulization 4 times per day    methylPREDNISolone  60 mg Intravenous Q6H    cefepime  1 g Intravenous Q8H    fluticasone propionate  1 spray Each Nare Daily    pantoprazole  40 mg Oral QAM AC    sulfamethoxazole-trimethoprim DS  1 tablet Oral Daily    heparin  7,500 Units Subcutaneous Q8H LÓPEZ    lisinopril  20 mg Oral Daily    fluticasone furoate-vilanterol  1 puff Inhalation Daily       Assessment & Plan:     Acute on chronic respiratory failure 2/2 pulmonary sarcoidosis/ILD and COPD. Possible asthma exacerbation  CXR reviewed  CT chest negative for PE  BNP 18, Dimer 1.42, Procal negative  Pulm and Rheum on consult   Cont Solumedrol, duonebs  ?atypical infection -> expanded resp panel neg  US guided thoracentesis -> hold for now  Echo with preserved EF and no WMA  Hold diuresis at this time  Started on cellcept   Strict Is and Os, daily weights  Cont to monitor in PCU on hi anette O2  ELPIDIO  CPAP  Continue protocol  Morbid obesity  BMI 42  Counseled on making healthy lifestyle and dietary changes  HTN  BP well controlled  Continue home lisinopril and hydrochlorothiazide  Monitor vitals  Advanced care planning  Full code  Plan of care discussed with patient or family at bedside.      Supplementary Documentation:     Quality:  DVT Prophylaxis: Heparin s/c  CODE status: Full      Estimated date of discharge: TBD  Discharge is dependent on: clinical stability  At this point Ms. Martinez is expected to be discharge to: home      MDM: High

## 2024-03-04 NOTE — TRANSFER CENTER NOTE
[facility] Transfer Note:  Reason for transfer:     Higher level of care    Request initiated by:    Dr Mckeon      Active Acute Medical issue:  Assessment & Plan:   1- acute on chronic respiratory failure with hypoxia :   CT with no PE with extensive bilateral GGO's and chronic apical changes  CXR today with extensive bilateral infiltrate with some progression     - progression of ILD / acute flare up and ALI      ? secondary to pulmonary sarcoidosis (Diagnosed with pulm sarcoidosis 1990  by bronchoscopy and biopsy at [place] )      Other autoimmune disease with acute on chronic ILD  Positive autoimmune serologies with positive ADELAIDE and LASHAWN and anticentromere antibody ?  Sjogren's or scleroderma or lupus     - underline asthma / ? Copd former smoker ( qquit 1990 )         Symptoms progressed  in the last 5 to 6 months.   Normal procalcitonin and normal BNP   Significant elevation in ESR      Normal ANCA   QuantiFERON gold TB negative  Low ACE -I level   + ADELAIDE , LASHAWN , positive lupus anticoagulantp and anticentromere antibody     Plan :  Remain on high-dose steroid  CellCept added on 3/1/2024  Continue with empiric antibiotics  O2 therapy  Rheumatology following  Keep in the unit        2-ELPIDIO  CPAP nightly /compliant     3-DVT prophylaxis  Heparin subcu     High risk and complex case .  Recommend transfer to tertiary care center /prefer [place] since patient was diagnosed with sarcoidosis in the past at [place] .  D/w staff     Anticipated Transfer Plan:   [facility] called, plan of care discussed with transfer center RN.  Face sheet faxed and copy of imaging has been requested for transport, bedside RN was updated. Patient/family was notified  by the provider and are agreeable to the plan.  [facility] is facilitating coordination of care and will follow.     [facility] #: [phone]    Provided [place] transfer center with pt information. They  do not have any ICU beds at this time but will place the pt on their list.

## 2024-03-04 NOTE — PLAN OF CARE
Pt remains alert and oriented overnight. Pt on high flow nasal cannula while awake uses cpap while asleep. Pt up in chair while sleeping. Pt had one episode of significant desaturation overnight, see flowsheets. Pt updated on plan of care and all questions answered.       Problem: Patient Centered Care  Goal: Patient preferences are identified and integrated in the patient's plan of care  Description: Interventions:  - What would you like us to know as we care for you? I live at home with my .  - Provide timely, complete, and accurate information to patient/family  - Incorporate patient and family knowledge, values, beliefs, and cultural backgrounds into the planning and delivery of care  - Encourage patient/family to participate in care and decision-making at the level they choose  - Honor patient and family perspectives and choices  Outcome: Progressing     Problem: Patient/Family Goals  Goal: Patient/Family Long Term Goal  Description: Patient's Long Term Goal: To go home    Interventions:  - Titrate oxygen down  - Medication compliance  - Follow provider recommendations  - See additional Care Plan goals for specific interventions  Outcome: Progressing  Goal: Patient/Family Short Term Goal  Description: Patient's Short Term Goal: To breathe easier    Interventions:   - Oxygen therapy  - IV solumedrol  - Nebulizer treatments  - Further testing  - Follow provider recommendations  - See additional Care Plan goals for specific interventions  Outcome: Progressing     Problem: CARDIOVASCULAR - ADULT  Goal: Maintains optimal cardiac output and hemodynamic stability  Description: INTERVENTIONS:  - Monitor vital signs, rhythm, and trends  - Monitor for bleeding, hypotension and signs of decreased cardiac output  - Evaluate effectiveness of vasoactive medications to optimize hemodynamic stability  - Monitor arterial and/or venous puncture sites for bleeding and/or hematoma  - Assess quality of pulses, skin color and  temperature  - Assess for signs of decreased coronary artery perfusion - ex. Angina  - Evaluate fluid balance, assess for edema, trend weights  Outcome: Progressing  Goal: Absence of cardiac arrhythmias or at baseline  Description: INTERVENTIONS:  - Continuous cardiac monitoring, monitor vital signs, obtain 12 lead EKG if indicated  - Evaluate effectiveness of antiarrhythmic and heart rate control medications as ordered  - Initiate emergency measures for life threatening arrhythmias  - Monitor electrolytes and administer replacement therapy as ordered  Outcome: Progressing     Problem: RESPIRATORY - ADULT  Goal: Achieves optimal ventilation and oxygenation  Description: INTERVENTIONS:  - Assess for changes in respiratory status  - Assess for changes in mentation and behavior  - Position to facilitate oxygenation and minimize respiratory effort  - Oxygen supplementation based on oxygen saturation or ABGs  - Provide Smoking Cessation handout, if applicable  - Encourage broncho-pulmonary hygiene including cough, deep breathe, Incentive Spirometry  - Assess the need for suctioning and perform as needed  - Assess and instruct to report SOB or any respiratory difficulty  - Respiratory Therapy support as indicated  - Manage/alleviate anxiety  - Monitor for signs/symptoms of CO2 retention  Outcome: Progressing     Problem: SKIN/TISSUE INTEGRITY - ADULT  Goal: Skin integrity remains intact  Description: INTERVENTIONS  - Assess and document risk factors for pressure ulcer development  - Assess and document skin integrity  - Monitor for areas of redness and/or skin breakdown  - Initiate interventions, skin care algorithm/standards of care as needed  Outcome: Progressing  Goal: Oral mucous membranes remain intact  Description: INTERVENTIONS  - Assess oral mucosa and hygiene practices  - Implement preventative oral hygiene regimen  - Implement oral medicated treatments as ordered  Outcome: Progressing

## 2024-03-04 NOTE — TRANSFER CENTER NOTE
Spoke to Gayle at [facility]. They do not have a step down unit. Currently they do not have any ICU or tele beds for transfer.

## 2024-03-04 NOTE — OCCUPATIONAL THERAPY NOTE
Attempted to see pt for OT tx however pt politely refusing. She is sitting up in the chair satting 88% on 10L. Pt reports she feels too SOB to participate in activity at this time. RN (Nathan) reports pt was up to rolling comode earlier in the day. Will cont to follow.

## 2024-03-04 NOTE — PROGRESS NOTES
[facility]     Hospitalist Progress Note     Freya Martinez Patient Status:  Inpatient    1955 MRN [id number]   Location [facility]5W Attending Pb Mendieta M MD   Hosp Day # 7 PCP FILEMON STALEY MD     Subjective:     Patient sitting up in chair  No acute events overnight  On 10 L o2  No acute resp distress  Cough persists  No cp, f,c,n,v abd pain or HA       Objective:    Review of Systems:   ROS completed; pertinent positive and negatives stated in subjective.      Vital signs:  Temp:  [96.6 °F (35.9 °C)-97.6 °F (36.4 °C)] 96.6 °F (35.9 °C)  Pulse:  [] 107  Resp:  [19-49] 43  BP: (122-158)/(53-78) 122/53  SpO2:  [70 %-98 %] 89 %      Physical Exam:    Gen: NAD AO x3  Chest: coarse BS b/l  CVS: normal s1 and s2 RR  Abd: NABS soft NT ND  Neuro: CN 2-12 grossly intact  Ext: no edema in bilateral LE      Diagnostic Data:    Labs:  Recent Labs   Lab 24  0528 24  0435 24  0403 24  0338 24  0508   WBC 15.9* 12.1* 12.0* 11.1* 9.7   HGB 11.5* 11.3* 11.4* 11.2* 11.9*   MCV 80.4 78.4* 79.0* 78.4* 80.9   .0 269.0 254.0 255.0 275.0       Recent Labs   Lab 24  0549 24  0529 24  0528 24  0403 24  0338 24  0508   * 114*   < > 111* 113* 106*   BUN 22 33*   < > 31* 35* 30*   CREATSERUM 0.85 0.96   < > 0.85 0.91 0.83   CA 9.2 9.3   < > 9.1 8.9 9.0   ALB 4.2 4.1   < > 3.8 3.7 3.6    141   < > 140 140 139   K 4.1 4.0   < > 4.6 4.8 4.9    103   < > 106 104 105   CO2 28.0 28.0   < > 27.0 29.0 27.0   ALKPHO 109 106  --   --   --   --    AST 23 19  --   --   --   --    ALT 17 14  --   --   --   --    BILT 0.5 0.5  --   --   --   --    TP 8.2 8.1  --   --   --   --     < > = values in this interval not displayed.       Estimated Creatinine Clearance: 64 mL/min (based on SCr of 0.83 mg/dL).    Recent Labs   Lab 24  1458   PTP 14.2              Imaging: Imaging data reviewed in Epic.    Medications:     mycophenolate sodium  360 mg Oral BID AC    ipratropium-albuterol  3 mL Nebulization 4 times per day    methylPREDNISolone  60 mg Intravenous Q6H    cefepime  1 g Intravenous Q8H    fluticasone propionate  1 spray Each Nare Daily    pantoprazole  40 mg Oral QAM AC    sulfamethoxazole-trimethoprim DS  1 tablet Oral Daily    heparin  7,500 Units Subcutaneous Q8H LÓPEZ    lisinopril  20 mg Oral Daily    fluticasone furoate-vilanterol  1 puff Inhalation Daily       Assessment & Plan:     Acute on chronic respiratory failure 2/2 pulmonary sarcoidosis/ILD and COPD. Possible asthma exacerbation  CXR reviewed  CT chest negative for PE  BNP 18, Dimer 1.42, Procal negative  Pulm and Rheum on consult   Cont Solumedrol, duonebs  ?atypical infection -> expanded resp panel neg  US guided thoracentesis -> hold for now  Echo with preserved EF and no WMA  Hold diuresis at this time  Started on cellcept   Strict Is and Os, daily weights  Cont to monitor in PCU on hi anette O2  ELPIDIO  CPAP  Continue protocol  Morbid obesity  BMI 42  Counseled on making healthy lifestyle and dietary changes  HTN  BP well controlled  Continue home lisinopril and hydrochlorothiazide  Monitor vitals  Advanced care planning  Full code  Plan of care discussed with patient or family at bedside.      Supplementary Documentation:     Quality:  DVT Prophylaxis: Heparin s/c  CODE status: Full      Estimated date of discharge: TBD  Discharge is dependent on: clinical stability  At this point Ms. Martinez is expected to be discharge to: home      MDM: High

## 2024-03-04 NOTE — TELEPHONE ENCOUNTER
Received fax from [facility] with oxygen SWO. Placed in Dr Mckeon's folder for signature

## 2024-03-04 NOTE — PROGRESS NOTES
[facility]  part of [facility]    Progress Note    Freya Martinez Patient Status:  Inpatient    1955 MRN [id number]   Location [facility] 2W/SW Attending Pb Mendieta MD   Hosp Day # 7 PCP FILEMON STALEY MD       Subjective:     Respiratory:  Positive for cough and shortness of breath.    Cardiovascular: Negative.    Gastrointestinal: Negative.    Musculoskeletal: Negative.    Skin: Negative.    Neurological: Negative.    Hematological: Negative.    Psychiatric/Behavioral: Negative.       Up to the chair on 10 L of oxygen  Cough with clear sputum  Denies fever  Denies chills  No abdominal pain or vomiting  Objective:   Blood pressure 141/59, pulse 94, temperature 97 °F (36.1 °C), temperature source Temporal, resp. rate (!) 46, weight 270 lb 11.6 oz (122.8 kg), SpO2 93%, not currently breastfeeding.  Physical Exam  Constitutional:       Appearance: She is obese. She is ill-appearing.   HENT:      Head: Normocephalic and atraumatic.      Right Ear: Tympanic membrane normal.      Nose: Nose normal.   Eyes:      General: No scleral icterus.  Cardiovascular:      Rate and Rhythm: Normal rate.      Heart sounds: No murmur heard.     No gallop.   Pulmonary:      Breath sounds: No stridor. Rales present. No wheezing or rhonchi.   Abdominal:      General: Abdomen is flat. Bowel sounds are normal.      Palpations: Abdomen is soft.   Musculoskeletal:      Cervical back: Normal range of motion. No rigidity.      Comments: Trace edema    Skin:     General: Skin is dry.   Neurological:      Mental Status: She is oriented to person, place, and time.         Results:   Lab Results   Component Value Date    WBC 9.7 2024    HGB 11.9 (L) 2024    HCT 39.8 2024    .0 2024    CREATSERUM 0.83 2024    BUN 30 (H) 2024     2024    K 4.9 2024     2024    CO2 27.0 2024     (H) 2024    CA 9.0 2024    ALB  3.6 03/04/2024    ALKPHO 106 02/28/2024    BILT 0.5 02/28/2024    TP 8.1 02/28/2024    AST 19 02/28/2024    ALT 14 02/28/2024    PTT 28.6 02/28/2024    TSH 1.790 05/12/2020    DDIMER 1.42 (H) 02/26/2024    ESRML >130 (H) 02/27/2024    CRP 4.30 (H) 02/13/2024    MG 2.6 03/02/2024    PHOS 4.5 03/04/2024    TROPHS 13 02/26/2024       XR CHEST AP PORTABLE  (CPT=71045)    Result Date: 3/4/2024  CONCLUSION: Extensive bilateral pulmonary opacities are similar or slightly increased compared to the prior exam.    Dictated by (CST): Anand Guzman MD on 3/04/2024 at 7:37 AM     Finalized by (CST): Anand Guzman MD on 3/04/2024 at 7:39 AM               Assessment & Plan:       1- acute on chronic respiratory failure with hypoxia :   CT with no PE with extensive bilateral GGO's and chronic apical changes  CXR today with extensive bilateral infiltrate with some progression    - progression of ILD / acute flare up and ALI      ? secondary to pulmonary sarcoidosis (Diagnosed with pulm sarcoidosis 1990  by bronchoscopy and biopsy at [facility] )      Other autoimmune disease with acute on chronic ILD  Positive autoimmune serologies with positive ADELAIDE and LASHAWN and anticentromere antibody ?  Sjogren's or scleroderma or lupus     - underline asthma / ? Copd former smoker ( qquit 1990 )         Symptoms progressed  in the last 5 to 6 months.   Normal procalcitonin and normal BNP   Significant elevation in ESR      Normal ANCA   QuantiFERON gold TB negative  Low ACE -I level   + ADELAIDE , LASHAWN , positive lupus anticoagulantp and anticentromere antibody     Plan :  Remain on high-dose steroid  CellCept added on 3/1/2024  Continue with empiric antibiotics  O2 therapy  Rheumatology following  Keep in the unit        2-ELPIDIO  CPAP nightly /compliant     3-DVT prophylaxis  Heparin subcu     High risk and complex case .  Recommend transfer to tertiary care center /prefer [facility] since patient was diagnosed with sarcoidosis in the past at [facility] .  D/w staff            Diann Mckeon MD  3/4/2024

## 2024-03-04 NOTE — TRANSFER CENTER NOTE
Spoke to Tay at [facility]. They do not have any ICU or tele beds. They are on hold for trauma as well.

## 2024-03-04 NOTE — DIETARY NOTE
ADULT NUTRITION BRIEF RESCREEN NOTE    ADMITTING DIAGNOSIS:  Sarcoidosis [D86.9]  Acute on chronic respiratory failure with hypoxia (HCC) [J96.21]  PERTINENT PAST MEDICAL HISTORY:   Past Medical History:   Diagnosis Date    Ascariosis     Asthma (HCC)     Hypertension     Sleep apnea      PATIENT STATUS:   03/04/24: Pt rescreened for prolonged LOS. Screened at no nutritional risk on initial admission MST by RN. RD reviewed chart and pt visited. Pt reports fair/normal appetite/intake. Typically consumes 2 meals daily. Pt ordering 2-3 trays per day per kitchen record. Consumed 97% of 9 recorded trays since admission. Reported  lbs. Noted wts variable since admission, ranging 228-289 lbs. Current wt 271 lbs this admission appears accurate and is c/w pt's reported UBW. Pt awaiting bed availability for transfer to tertiary center for higher level of care. Pt rescreened at no nutritional risk.    Percent Meals Eaten (last 3 days)       Date/Time Percent Meals Eaten (%)    03/01/24 0900 100 %    03/02/24 2200 100 %    03/03/24 2000 100 %    03/04/24 1030 80 %           ANTHROPOMETRICS:  HT:  170.2 cm (5'7\")  WT: 122.8 kg (270 lb 11.6 oz)   BMI: Body mass index is 42.4 kg/m².  BMI CLASSIFICATION: greater than 40 kg/m2 - morbid obesity class III    WEIGHT HISTORY:  Patient Weight(s) for the past 336 hrs:   Weight   03/04/24 0600 122.8 kg (270 lb 11.6 oz)   03/03/24 0000 103.6 kg (228 lb 6.3 oz)   03/02/24 0500 116.9 kg (257 lb 11.5 oz)   02/26/24 1459 131 kg (288 lb 12.8 oz)     Wt Readings from Last 10 Encounters:   03/04/24 122.8 kg (270 lb 11.6 oz)   02/21/24 126.6 kg (279 lb)   02/12/24 124.4 kg (274 lb 4.8 oz)   02/04/20 108.9 kg (240 lb)   02/03/20 108.9 kg (240 lb)     - Diet:       Procedures    Cardiac diet Cardiac; Is Patient on Accuchecks? No      DIETITIAN FOLLOW UP: RD to follow per protocol and rescreen again in 7 days for LOS. Please consult nutrition services if earlier intervention is indicated.      Ashley Miller MS, RD, LDN, [facility]  [id number]

## 2024-03-05 NOTE — PLAN OF CARE
Pt A&OX4 on 12 L HFNC, pt on cpap at night. Pt desaturated into the 70s while on cpap, switched back over to HFNC. Strong productive cough throughout the night. Pt complaining of numbness on right pinky finger and swelling. Bed locked in lowest position, call light within reach. Family member at bedside, safety maintained.   Problem: Patient Centered Care  Goal: Patient preferences are identified and integrated in the patient's plan of care  Description: Interventions:  - What would you like us to know as we care for you? I live at home with my .  - Provide timely, complete, and accurate information to patient/family  - Incorporate patient and family knowledge, values, beliefs, and cultural backgrounds into the planning and delivery of care  - Encourage patient/family to participate in care and decision-making at the level they choose  - Honor patient and family perspectives and choices  Outcome: Progressing     Problem: Patient/Family Goals  Goal: Patient/Family Long Term Goal  Description: Patient's Long Term Goal: To go home    Interventions:  - Titrate oxygen down  - Medication compliance  - Follow provider recommendations  - See additional Care Plan goals for specific interventions  Outcome: Progressing  Goal: Patient/Family Short Term Goal  Description: Patient's Short Term Goal: To breathe easier    Interventions:   - Oxygen therapy  - IV solumedrol  - Nebulizer treatments  - Further testing  - Follow provider recommendations  - See additional Care Plan goals for specific interventions  Outcome: Progressing     Problem: CARDIOVASCULAR - ADULT  Goal: Maintains optimal cardiac output and hemodynamic stability  Description: INTERVENTIONS:  - Monitor vital signs, rhythm, and trends  - Monitor for bleeding, hypotension and signs of decreased cardiac output  - Evaluate effectiveness of vasoactive medications to optimize hemodynamic stability  - Monitor arterial and/or venous puncture sites for bleeding  and/or hematoma  - Assess quality of pulses, skin color and temperature  - Assess for signs of decreased coronary artery perfusion - ex. Angina  - Evaluate fluid balance, assess for edema, trend weights  Outcome: Progressing  Goal: Absence of cardiac arrhythmias or at baseline  Description: INTERVENTIONS:  - Continuous cardiac monitoring, monitor vital signs, obtain 12 lead EKG if indicated  - Evaluate effectiveness of antiarrhythmic and heart rate control medications as ordered  - Initiate emergency measures for life threatening arrhythmias  - Monitor electrolytes and administer replacement therapy as ordered  Outcome: Progressing     Problem: RESPIRATORY - ADULT  Goal: Achieves optimal ventilation and oxygenation  Description: INTERVENTIONS:  - Assess for changes in respiratory status  - Assess for changes in mentation and behavior  - Position to facilitate oxygenation and minimize respiratory effort  - Oxygen supplementation based on oxygen saturation or ABGs  - Provide Smoking Cessation handout, if applicable  - Encourage broncho-pulmonary hygiene including cough, deep breathe, Incentive Spirometry  - Assess the need for suctioning and perform as needed  - Assess and instruct to report SOB or any respiratory difficulty  - Respiratory Therapy support as indicated  - Manage/alleviate anxiety  - Monitor for signs/symptoms of CO2 retention  Outcome: Progressing     Problem: SKIN/TISSUE INTEGRITY - ADULT  Goal: Skin integrity remains intact  Description: INTERVENTIONS  - Assess and document risk factors for pressure ulcer development  - Assess and document skin integrity  - Monitor for areas of redness and/or skin breakdown  - Initiate interventions, skin care algorithm/standards of care as needed  Outcome: Progressing  Goal: Oral mucous membranes remain intact  Description: INTERVENTIONS  - Assess oral mucosa and hygiene practices  - Implement preventative oral hygiene regimen  - Implement oral medicated treatments  as ordered  Outcome: Progressing

## 2024-03-05 NOTE — PROGRESS NOTES
[facility]     Hospitalist Progress Note     Freya Martinez Patient Status:  Inpatient    1955 MRN [id number]   Location [facility]5W Attending Pb Mendieta M MD   Hosp Day # 8 PCP FILEMON STALEY MD     Subjective:     Patient sitting up in chair  No acute events overnight  Remains on 10 L HF  No acute resp distress  Currently receiving neb tx   No cp, f,c,n,v abd pain or HA       Objective:    Review of Systems:   ROS completed; pertinent positive and negatives stated in subjective.      Vital signs:  Temp:  [96.9 °F (36.1 °C)-97.8 °F (36.6 °C)] 97.2 °F (36.2 °C)  Pulse:  [] 99  Resp:  [21-49] 29  BP: (104-130)/(51-77) 104/51  SpO2:  [86 %-100 %] 90 %      Physical Exam:    Gen: NAD AO x3  Chest: coarse BS b/l  CVS: normal s1 and s2 RR  Abd: NABS soft NT ND  Neuro: CN 2-12 grossly intact  Ext: no edema in bilateral LE      Diagnostic Data:    Labs:  Recent Labs   Lab 24  0435 24  0403 24  0338 24  0508 24  0422   WBC 12.1* 12.0* 11.1* 9.7 11.2*   HGB 11.3* 11.4* 11.2* 11.9* 12.2   MCV 78.4* 79.0* 78.4* 80.9 78.8*   .0 254.0 255.0 275.0 269.0       Recent Labs   Lab 24  0529 24  0528 24  0338 24  0508 24  0422   *   < > 113* 106* 121*   BUN 33*   < > 35* 30* 42*   CREATSERUM 0.96   < > 0.91 0.83 1.16*   CA 9.3   < > 8.9 9.0 9.0   ALB 4.1   < > 3.7 3.6 3.7      < > 140 139 137   K 4.0   < > 4.8 4.9 4.8      < > 104 105 105   CO2 28.0   < > 29.0 27.0 27.0   ALKPHO 106  --   --   --   --    AST 19  --   --   --   --    ALT 14  --   --   --   --    BILT 0.5  --   --   --   --    TP 8.1  --   --   --   --     < > = values in this interval not displayed.       Estimated Creatinine Clearance: 45.8 mL/min (A) (based on SCr of 1.16 mg/dL (H)).    Recent Labs   Lab 24  1458   PTP 14.2              Imaging: Imaging data reviewed in Epic.    Medications:    [START ON 3/6/2024]  sulfamethoxazole-trimethoprim DS  1 tablet Oral Once per day on Monday Wednesday Friday    mycophenolate sodium  360 mg Oral BID AC    ipratropium-albuterol  3 mL Nebulization 4 times per day    methylPREDNISolone  60 mg Intravenous Q6H    cefepime  1 g Intravenous Q8H    fluticasone propionate  1 spray Each Nare Daily    pantoprazole  40 mg Oral QAM AC    heparin  7,500 Units Subcutaneous Q8H LÓPEZ    lisinopril  20 mg Oral Daily    fluticasone furoate-vilanterol  1 puff Inhalation Daily       Assessment & Plan:     Acute on chronic respiratory failure 2/2 pulmonary sarcoidosis/ILD and COPD. Possible asthma exacerbation  CXR reviewed  CT chest negative for PE  BNP 18, Dimer 1.42, Procal negative  Pulm and Rheum on consult   Cont Solumedrol, duonebs  ?atypical infection -> expanded resp panel neg  US guided thoracentesis -> hold for now  Echo with preserved EF and no WMA  Hold diuresis at this time  Started on cellcept   Rheum will start on rituximab    Strict Is and Os, daily weights  Cont to monitor in PCU on hi anette O2  ELPIDIO  CPAP  Continue protocol  Morbid obesity  BMI 42  Counseled on making healthy lifestyle and dietary changes  HTN  BP well controlled  Continue home lisinopril and hydrochlorothiazide  Monitor vitals  Advanced care planning  Full code  Plan of care discussed with patient or family at bedside.  Awaiting bed at [facility]       Supplementary Documentation:     Quality:  DVT Prophylaxis: Heparin s/c  CODE status: Full      Estimated date of discharge: TBD  Discharge is dependent on: clinical stability  At this point Ms. Martinez is expected to be discharge to: home    Chart reviewed, including current vitals, notes, labs and imaging  Pertinent past medical records reviewed  Labs ordered and medications adjusted as outlined above  Coordinate care with care team/consultants  Discussed with patient and family at bedside results of tests, management plan as outlined above, and the need for ongoing  hospitalization  D/w RN       MDM: High

## 2024-03-05 NOTE — CM/SW NOTE
Freya is on 10L HFNC O2.  Transfer to [facility] still in progress, no bed availably yet a [place].    / to remain available for support and/or discharge planning.      Rose JONESA BSN RN CRRN   RN Case Manager  [phone]

## 2024-03-05 NOTE — PROGRESS NOTES
[facility]  part of [facility]    Progress Note    Freya Martinez Patient Status:  Inpatient    1955 MRN [id number]   Location [facility] 2W/SW Attending Pb Mendieta MD   Hosp Day # 8 PCP FILEMON STALEY MD         Subjective:   Subjective:  Claims some improvement today  Up to the chair on 12 L of oxygen  Occasional cough with clear sputum with no fever  Denies any chest pain  No lower extremity edema today  No fever  No hemoptysis  Objective:   Blood pressure 125/60, pulse 100, temperature 97.2 °F (36.2 °C), temperature source Temporal, resp. rate 25, weight 270 lb 11.6 oz (122.8 kg), SpO2 91%, not currently breastfeeding.  Physical Exam  Constitutional:       General: She is not in acute distress.     Appearance: She is obese.   HENT:      Head: Normocephalic and atraumatic.   Eyes:      General: No scleral icterus.  Cardiovascular:      Rate and Rhythm: Normal rate.      Heart sounds:      No gallop.   Pulmonary:      Effort: No respiratory distress.      Breath sounds: No stridor. Rales present. No wheezing or rhonchi.   Abdominal:      General: Abdomen is flat. Bowel sounds are normal. There is no distension.      Palpations: Abdomen is soft.      Tenderness: There is no guarding.   Musculoskeletal:      Cervical back: Normal range of motion.      Right lower leg: No edema.      Left lower leg: No edema.   Skin:     General: Skin is dry.   Neurological:      Mental Status: She is oriented to person, place, and time.         Results:   Lab Results   Component Value Date    WBC 11.2 (H) 2024    HGB 12.2 2024    HCT 38.2 2024    .0 2024    CREATSERUM 1.16 (H) 2024    BUN 42 (H) 2024     2024    K 4.8 2024     2024    CO2 27.0 2024     (H) 2024    CA 9.0 2024    ALB 3.7 2024    ALKPHO 106 2024    BILT 0.5 2024    TP 8.1 2024    AST 19 2024    ALT  14 02/28/2024    PTT 28.6 02/28/2024    TSH 1.790 05/12/2020    DDIMER 1.42 (H) 02/26/2024    ESRML >130 (H) 02/27/2024    CRP 4.30 (H) 02/13/2024    MG 2.6 03/02/2024    PHOS 4.3 03/05/2024    TROPHS 13 02/26/2024       XR CHEST AP PORTABLE  (CPT=71045)    Result Date: 3/4/2024  CONCLUSION: Extensive bilateral pulmonary opacities are similar or slightly increased compared to the prior exam.    Dictated by (CST): Anand Guzman MD on 3/04/2024 at 7:37 AM     Finalized by (CST): Anand Guzman MD on 3/04/2024 at 7:39 AM               Assessment & Plan:     1- acute on chronic respiratory failure with hypoxia :   CT with no PE with extensive bilateral GGO's and chronic changes     - likely progression of ILD / acute flare up and ALI      ? secondary to pulmonary sarcoidosis (Diagnosed with pulm sarcoidosis 1990  by bronchoscopy and biopsy at [facility] )      Other autoimmune disease with acute on chronic ILD  Positive autoimmune serologies with positive ADELAIDE and LASHAWN and anticentromere antibody ?  Sjogren's or scleroderma or lupus     - underline asthma / ? Copd former smoker ( qquit 1990 )         Symptoms progressed  in the last 5 to 6 months.   Normal procalcitonin and normal BNP   Significant elevation in ESR      Normal ANCA   QuantiFERON gold TB negative  Low ACE -I level   + ADELAIDE , LASHAWN , positive lupus anticoagulantp and anticentromere antibody     Plan :  Remain on high-dose steroid  CellCept added on 3/1/2024  Continue with empiric antibiotics to finish 7 days   PCP prophylaxis with Bactrim  O2 therapy  Rheumatology following        2-ELPIDIO  CPAP nightly /compliant     3-DVT prophylaxis  Heparin subcu     High risk and complex case .  Recommend transfer to tertiary care center /prefer [facility] since patient was diagnosed with sarcoidosis in the past at [facility] .  D/w staff               Diann Mckeon MD  3/5/2024

## 2024-03-05 NOTE — TRANSFER CENTER NOTE
Contacted [place] transfer center, [facility]. They do not have any beds for transfer. They are on trauma hold as well.

## 2024-03-05 NOTE — PROGRESS NOTES
[facility]  part of [facility]    Progress Note    Freya Martinez Patient Status:  Inpatient    1955 MRN [id number]   Location [facility] 2W/SW Attending Pb Mendieta MD   Hosp Day # 7 PCP FILEMON STALEY MD     Subjective:     Sitting in a chair   Now on 10 L NC      Objective:   Blood pressure 129/67, pulse 99, temperature 97.7 °F (36.5 °C), temperature source Temporal, resp. rate (!) 49, weight 270 lb 11.6 oz (122.8 kg), SpO2 (!) 88%, not currently breastfeeding.    GEN: AAOx3, NAD  HEENT: EOMI, PERRLA, no injection or icterus, oral mucosa moist, no oral lesions. No lymphadenopathy. No facial rash  CVS: RRR, no murmurs rubs or gallops. Equal 2+ distal pulses.   LUNGS: on high flow oxygen  ABDOMEN:  soft NT/ND, +BS, no HSM  SKIN: No rashes or skin lesions. No nail findings  MSK:  No swelling or synovitis on exam  NEURO: Cranial nerves II-XII intact grossly. 5/5 strength throughout in both upper and lower extremities, sensation intact.  PSYCH: normal mood      Results:   Lab Results   Component Value Date    WBC 9.7 2024    HGB 11.9 (L) 2024    HCT 39.8 2024    .0 2024    CREATSERUM 0.83 2024    BUN 30 (H) 2024     2024    K 4.9 2024     2024    CO2 27.0 2024     (H) 2024    CA 9.0 2024    ALB 3.6 2024    ALKPHO 106 2024    BILT 0.5 2024    TP 8.1 2024    AST 19 2024    ALT 14 2024    PTT 28.6 2024    TSH 1.790 2020    DDIMER 1.42 (H) 2024    ESRML >130 (H) 2024    CRP 4.30 (H) 2024    MG 2.6 2024    PHOS 4.5 2024    TROPHS 13 2024       XR CHEST AP PORTABLE  (CPT=71045)    Result Date: 3/4/2024  CONCLUSION: Extensive bilateral pulmonary opacities are similar or slightly increased compared to the prior exam.    Dictated by (CST): Anand Guzman MD on 3/04/2024 at 7:37 AM     Finalized by (CST):  CyruswiAnand MD on 3/04/2024 at 7:39 AM               Assessment & Plan:       Acute on chronic hypoxic respiratory failure  - Prior history of sarcoidosis where she was treated with corticosteroids in the past  - Now blood work showing a positive SSA and centromere.  She has no other symptoms of Sjogren's or scleroderma but recent CT findings are showing extensive GGO and upper lobe with fibrosis, these findings could be seen in connective tissue disease  - now on 10 L NC but still has sob at rest   - She remains on IV Solu-Medrol 60 mg every 6 hours  - Started Myfortic 360 mg BID 3/2/24  - also found to have a small to moderate left pleural effusion, pulmonology tried to do a thoracentesis but there was not enough fluid  - echocardiogram done 2/27 normal     Positive ADELAIDE, SSA and centromere  - She has no other symptoms of Sjogren's or scleroderma.    Will continue to follow. In the process of trying to transfer to [facility]     Tania Hoff MD  3/4/2024

## 2024-03-05 NOTE — TRANSFER CENTER NOTE
[place] transfer center called for an update. Provided them with the change in oxygen that occurred during the night. [place] is still on transfer hold but the pt is on their list.

## 2024-03-06 NOTE — PROGRESS NOTES
[facility]     Hospitalist Progress Note     Freya Martinez Patient Status:  Inpatient    1955 MRN [id number]   Location [facility]5W Attending Pb Mendieta M MD   Hosp Day # 9 PCP FILEMON STALEY MD     Subjective:     Patient sitting up in chair  No acute events overnight  Remains on 12 L HF  No acute resp distress  Currently receiving neb tx   No cp, f,c,n,v abd pain or HA     No acute overnight events reported by the nursing staff.       Objective:    Review of Systems:   ROS completed; pertinent positive and negatives stated in subjective.      Vital signs:  Temp:  [96.6 °F (35.9 °C)-98.5 °F (36.9 °C)] 96.6 °F (35.9 °C)  Pulse:  [] 88  Resp:  [20-42] 30  BP: (104-136)/(51-71) 132/55  SpO2:  [87 %-96 %] 90 %      Physical Exam:    Gen: NAD AO x3  Chest: coarse BS b/l  CVS: normal s1 and s2 RR  Abd: NABS soft NT ND  Neuro: CN 2-12 grossly intact  Ext: no edema in bilateral LE      Diagnostic Data:    Labs:  Recent Labs   Lab 24  0403 24  0338 24  0508 24  0422 24  0805   WBC 12.0* 11.1* 9.7 11.2* 9.9   HGB 11.4* 11.2* 11.9* 12.2 12.1   MCV 79.0* 78.4* 80.9 78.8* 83.6   .0 255.0 275.0 269.0 252.0       Recent Labs   Lab 24  0508 24  0422 24  0805   * 121* 114*   BUN 30* 42* 37*   CREATSERUM 0.83 1.16* 1.08*   CA 9.0 9.0 9.0   ALB 3.6 3.7 3.7    137 134*   K 4.9 4.8 5.3*    105 106   CO2 27.0 27.0 22.0       Estimated Creatinine Clearance: 49.2 mL/min (A) (based on SCr of 1.08 mg/dL (H)).    Recent Labs   Lab 24  1458   PTP 14.2              Imaging: Imaging data reviewed in Epic.    Medications:    amLODIPine  5 mg Oral Daily    sulfamethoxazole-trimethoprim DS  1 tablet Oral Once per day on     mycophenolate sodium  360 mg Oral BID AC    ipratropium-albuterol  3 mL Nebulization 4 times per day    methylPREDNISolone  60 mg Intravenous Q6H    cefepime  1 g Intravenous Q8H     fluticasone propionate  1 spray Each Nare Daily    pantoprazole  40 mg Oral QAM AC    heparin  7,500 Units Subcutaneous Q8H LÓPEZ    fluticasone furoate-vilanterol  1 puff Inhalation Daily       Assessment & Plan:     Acute on chronic respiratory failure 2/2 pulmonary sarcoidosis/ILD and COPD. Possible asthma exacerbation  CXR reviewed  CT chest negative for PE  BNP 18, Dimer 1.42, Procal negative  Pulm and Rheum on consult   Cont Solumedrol, duonebs  ?atypical infection -> expanded resp panel neg  US guided thoracentesis -> hold for now  Echo with preserved EF and no WMA  Hold diuresis at this time  Started on mycophenolate, rituximab    Strict Is and Os, daily weights  Cont to monitor in PCU on HFNC   ELPIDIO  CPAP - unable to tolerate most nights  Continue protocol  Morbid obesity  BMI 42  Counseled on making healthy lifestyle and dietary changes  HTN  BP well controlled  Continue home hydrochlorothiazide  Lisinopril changed to amlodipine given hyperkalemia  Monitor vitals  5.    Hyperkalemia   1.    Stop lisinopril   2.    Monitor labs      Advanced care planning  Full code  Plan of care discussed with patient or family at bedside.  Awaiting bed at [facility]       Supplementary Documentation:     Quality:  DVT Prophylaxis: Heparin s/c  CODE status: Full      Estimated date of discharge: TBD  Discharge is dependent on: clinical stability  At this point Ms. Martinez is expected to be discharge to: home    Chart reviewed, including current vitals, notes, labs and imaging  Pertinent past medical records reviewed  Labs ordered and medications adjusted as outlined above  Coordinate care with care team/consultants  Discussed with patient and family at bedside results of tests, management plan as outlined above, and the need for ongoing hospitalization  D/w RN       MDM: High

## 2024-03-06 NOTE — PHYSICAL THERAPY NOTE
PHYSICAL THERAPY TREATMENT NOTE - INPATIENT     Room Number: 201/201-A       Presenting Problem: respiratory failure  Co-Morbidities : sarcoidosis    Problem List  Principal Problem:    Acute on chronic respiratory failure with hypoxia (HCC)  Active Problems:    Sarcoidosis    Sarcoidosis of lung (HCC)      PHYSICAL THERAPY ASSESSMENT   Patient demonstrates fair progress this session, goals  remain in progress.    Patient continues to function below baseline with transfers, gait, stair negotiation, standing prolonged periods, and performing household tasks.  Contributing factors to remaining limitations include decreased endurance/aerobic capacity and increased O2 needs from baseline.  Next session anticipate patient to progress transfers, gait, and standing prolonged periods.  Physical Therapy will continue to follow patient for duration of hospitalization.    Patient continues to benefit from continued skilled PT services: For duration of hospitalization, however, given the patient is functioning near baseline level do not anticipate skilled therapy needs at discharge .    PLAN  PT Treatment Plan: Bed mobility;Body mechanics;Patient education;Gait training;Balance training;Transfer training  Frequency (Obs): 3x/week    SUBJECTIVE  \"I haven't been walking. They've been using the rolling chair\"     OBJECTIVE  Precautions: None  PAIN ASSESSMENT   Ratin          BALANCE  Static Sitting: Normal  Dynamic Sitting: Good  Static Standing: Good  Dynamic Standing: Fair +    ACTIVITY TOLERANCE  Pulse: 104  Heart Rate Source: Monitor  Resp: (!) 35                 O2 WALK  Oxygen Therapy  SPO2% on Oxygen at Rest: 91  At rest oxygen flow (liters per minute): 14  SPO2% Ambulation on Oxygen: 77  Ambulation oxygen flow (liters per minute): 14    AM-PAC '6-Clicks' INPATIENT SHORT FORM - BASIC MOBILITY  How much difficulty does the patient currently have...  Patient Difficulty: Turning over in bed (including adjusting bedclothes,  sheets and blankets)?: None   Patient Difficulty: Sitting down on and standing up from a chair with arms (e.g., wheelchair, bedside commode, etc.): None   Patient Difficulty: Moving from lying on back to sitting on the side of the bed?: None   How much help from another person does the patient currently need...   Help from Another: Moving to and from a bed to a chair (including a wheelchair)?: A Little   Help from Another: Need to walk in hospital room?: A Little   Help from Another: Climbing 3-5 steps with a railing?: A Little     AM-PAC Score:  Raw Score: 21   Approx Degree of Impairment: 28.97%   Standardized Score (AM-PAC Scale): 50.25   CMS Modifier (G-Code): CJ    FUNCTIONAL ABILITY STATUS  Functional Mobility/Gait Assessment  Gait Assistance: Not tested  Distance (ft): -  Assistive Device: None  Sit to Stand: independent. Instruction on pacing upon standing- cues for upright posture and shoulder relaxation. Tolerated 1 minute of static standing, then required to sit 2* shortness of breath.     Additional information: Patient received sitting in bedside chair with family present. RN approved activity. Therapist educated on POC and physiological benefits of mobilization. Cues for activity pacing, energy conservation and pursed lipped breathing technique. Patient agreeable to participate. Denies pain. *SpO2 on 14L/min supplemental O2 via NC at rest: 91% with standing activity: 77%. RN made aware or session findings.     The patient's Approx Degree of Impairment: 28.97% has been calculated based on documentation in the [facility] '6 clicks' Inpatient Daily Activity Short Form.  Research supports that patients with this level of impairment may benefit from home.  Final disposition will be made by interdisciplinary medical team.    THERAPEUTIC EXERCISES  Lower Extremity Ankle pumps  LAQ     Position Sitting       Patient End of Session: Up in chair;Call light within reach;Needs met;Family present    CURRENT GOALS    Goals to be met by: 3/20/24  Patient Goal Patient's self-stated goal is: to go home   Goal #1 Patient is able to demonstrate supine - sit EOB @ level: independent      Goal #1   Current Status  NT   Goal #2 Patient is able to demonstrate transfers Sit to/from Stand at assistance level: independent with none      Goal #2  Current Status  Independent- GOAL MET 3/6   Goal #3 Patient is able to ambulate 100 feet with assist device: none at assistance level: independent with oxygen sat remaining above 90%   Goal #3   Current Status  NT, desat with standing   Goal #4 Patient will negotiate 6 stairs/one curb w/ assistive device and supervision   Goal #4   Current Status  NT   Goal #5 Patient to demonstrate independence with home activity/exercise instructions provided to patient in preparation for discharge.   Goal #5   Current Status  ongoing     Therapeutic Activity: 14 minutes

## 2024-03-06 NOTE — TRANSFER CENTER NOTE
Called [facility] and spoke to Gayle.  They still do not have any ICU beds.  Pt remains on the list.  Bubbled chat the team to determine if we should try another tertiary care center.    Pt does follow at [facility].      6:35 PM:  Team has not weighed in as to whether pt should go to another tertiary care center.  Pt receives care at [facility].  ED CM to create disc with imaging and tube to tube # 122.

## 2024-03-06 NOTE — SPIRITUAL CARE NOTE
Spiritual Care Visit Note    Patient Name: Freya Martinez Date of Spiritual Care Visit: 24   : 1955 Primary Dx: Acute on chronic respiratory failure with hypoxia (HCC)       Referred By:  LOS    Visit Type/Summary:     - Spiritual Care: Attempted visit. Patient is unavailable. Left a Spiritual Care contact information card.    Spiritual Care support can be requested via an [facility] consult. For urgent/immediate needs, please contact the On Call  at: [place]: ext [phone]    Chaplain Catrina.

## 2024-03-06 NOTE — PROGRESS NOTES
[facility]  part of [facility]    Progress Note    Freya Martinez Patient Status:  Inpatient    1955 MRN [id number]   Location [facility] 2W/SW Attending Jennifer Lundberg MD   Hosp Day # 9 PCP FILEMON STALEY MD         Subjective:   Subjective:  Up to the chair  Occasional cough with no colored sputum or hemoptysis  No fever  Claimed some improvement but still on 10-12 L of oxygen  Denies abdominal pain or vomiting  Objective:   Blood pressure 134/56, pulse 90, temperature 96.6 °F (35.9 °C), temperature source Temporal, resp. rate (!) 35, weight 270 lb 11.6 oz (122.8 kg), SpO2 91%, not currently breastfeeding.  Physical Exam  Constitutional:       General: She is in acute distress.      Appearance: She is obese. She is ill-appearing.   HENT:      Head: Atraumatic.      Nose: Nose normal.      Mouth/Throat:      Mouth: Mucous membranes are moist.   Eyes:      General: No scleral icterus.  Cardiovascular:      Rate and Rhythm: Normal rate.      Heart sounds:      No gallop.   Pulmonary:      Breath sounds: No stridor. Rales present. No wheezing or rhonchi.   Abdominal:      General: Abdomen is flat. Bowel sounds are normal.      Palpations: Abdomen is soft.      Tenderness: There is no guarding.   Musculoskeletal:      Cervical back: Normal range of motion.      Right lower leg: No edema.      Left lower leg: No edema.   Skin:     General: Skin is dry.   Neurological:      General: No focal deficit present.      Mental Status: She is oriented to person, place, and time.         Results:   Lab Results   Component Value Date    WBC 9.9 2024    HGB 12.1 2024    HCT 41.3 2024    .0 2024    CREATSERUM 1.08 (H) 2024    BUN 37 (H) 2024     (L) 2024    K 5.3 (H) 2024     2024    CO2 22.0 2024     (H) 2024    CA 9.0 2024    ALB 3.7 2024    ALKPHO 106 2024    BILT 0.5 2024    TP  8.1 02/28/2024    AST 19 02/28/2024    ALT 14 02/28/2024    PTT 28.6 02/28/2024    TSH 1.790 05/12/2020    DDIMER 1.42 (H) 02/26/2024    ESRML >130 (H) 02/27/2024    CRP 4.30 (H) 02/13/2024    MG 2.6 03/06/2024    PHOS 4.0 03/06/2024    TROPHS 13 02/26/2024         Assessment & Plan:      1- acute on chronic respiratory failure with hypoxia :   CT with no PE with extensive bilateral GGO's and chronic changes     - likely progression of ILD /secondary to autoimmune disease with positive SSA and anticentromere  , ? scleroderma with pulmonary involvement     - ? secondary to pulmonary sarcoidosis (Diagnosed with pulm sarcoidosis 1990  by bronchoscopy and biopsy at [facility] )      - underline asthma / ? Copd former smoker ( qquit 1990 )         Symptoms progressed  in the last 5 to 6 months.   Normal procalcitonin and normal BNP   Significant elevation in ESR      Normal ANCA   QuantiFERON gold TB negative  Normal Aspergillus galactomannan  Low ACE -I level   +LASHAWN , positive lupus anticoagulantp and anticentromere antibody     Plan :  Remain on high-dose steroid  Mycophenolate added on 3/1/2024  Continue with empiric antibiotics to finish 7 days ( stop tomorrow )   PCP prophylaxis with Bactrim every other day  To start rituximab  O2 therapy  Rheumatology following        2-ELPIDIO  CPAP nightly /compliant     3-DVT prophylaxis  Heparin subcu    4- hihg K   Will stop  ACE inhibitor changed to amlodipine     High risk and complex case .  Recommend transfer to tertiary care center  Accepted at [facility] awaiting bed availability  D/w staff   D/w pt              Diann Mckeon MD  3/6/2024

## 2024-03-06 NOTE — PROGRESS NOTES
Attempted to see pt for OT session. Pt siting up in chair, just beginning to eat her breakfast. Plan to reschedule session for a later time. Nurse aware.

## 2024-03-06 NOTE — PLAN OF CARE
Pt A&OX4 on 14 L HFNC. Pt did not tolerate cpap overnight. Pt desatting into low 80s with movement/standing. Bed locked in lowest position, call light within reach. Safety maintained.   Problem: Patient Centered Care  Goal: Patient preferences are identified and integrated in the patient's plan of care  Description: Interventions:  - What would you like us to know as we care for you? I live at home with my .  - Provide timely, complete, and accurate information to patient/family  - Incorporate patient and family knowledge, values, beliefs, and cultural backgrounds into the planning and delivery of care  - Encourage patient/family to participate in care and decision-making at the level they choose  - Honor patient and family perspectives and choices  Outcome: Progressing     Problem: Patient/Family Goals  Goal: Patient/Family Long Term Goal  Description: Patient's Long Term Goal: To go home    Interventions:  - Titrate oxygen down  - Medication compliance  - Follow provider recommendations  - See additional Care Plan goals for specific interventions  Outcome: Progressing  Goal: Patient/Family Short Term Goal  Description: Patient's Short Term Goal: To breathe easier    Interventions:   - Oxygen therapy  - IV solumedrol  - Nebulizer treatments  - Further testing  - Follow provider recommendations  - See additional Care Plan goals for specific interventions  Outcome: Progressing     Problem: CARDIOVASCULAR - ADULT  Goal: Maintains optimal cardiac output and hemodynamic stability  Description: INTERVENTIONS:  - Monitor vital signs, rhythm, and trends  - Monitor for bleeding, hypotension and signs of decreased cardiac output  - Evaluate effectiveness of vasoactive medications to optimize hemodynamic stability  - Monitor arterial and/or venous puncture sites for bleeding and/or hematoma  - Assess quality of pulses, skin color and temperature  - Assess for signs of decreased coronary artery perfusion - ex. Angina  -  Evaluate fluid balance, assess for edema, trend weights  Outcome: Progressing  Goal: Absence of cardiac arrhythmias or at baseline  Description: INTERVENTIONS:  - Continuous cardiac monitoring, monitor vital signs, obtain 12 lead EKG if indicated  - Evaluate effectiveness of antiarrhythmic and heart rate control medications as ordered  - Initiate emergency measures for life threatening arrhythmias  - Monitor electrolytes and administer replacement therapy as ordered  Outcome: Progressing     Problem: RESPIRATORY - ADULT  Goal: Achieves optimal ventilation and oxygenation  Description: INTERVENTIONS:  - Assess for changes in respiratory status  - Assess for changes in mentation and behavior  - Position to facilitate oxygenation and minimize respiratory effort  - Oxygen supplementation based on oxygen saturation or ABGs  - Provide Smoking Cessation handout, if applicable  - Encourage broncho-pulmonary hygiene including cough, deep breathe, Incentive Spirometry  - Assess the need for suctioning and perform as needed  - Assess and instruct to report SOB or any respiratory difficulty  - Respiratory Therapy support as indicated  - Manage/alleviate anxiety  - Monitor for signs/symptoms of CO2 retention  Outcome: Progressing     Problem: SKIN/TISSUE INTEGRITY - ADULT  Goal: Skin integrity remains intact  Description: INTERVENTIONS  - Assess and document risk factors for pressure ulcer development  - Assess and document skin integrity  - Monitor for areas of redness and/or skin breakdown  - Initiate interventions, skin care algorithm/standards of care as needed  Outcome: Progressing  Goal: Oral mucous membranes remain intact  Description: INTERVENTIONS  - Assess oral mucosa and hygiene practices  - Implement preventative oral hygiene regimen  - Implement oral medicated treatments as ordered  Outcome: Progressing

## 2024-03-06 NOTE — PROGRESS NOTES
[facility]  part of [facility]    Progress Note    Freya Martinez Patient Status:  Inpatient    1955 MRN [id number]   Location [facility] 2W/SW Attending Pb Mendieta MD   Hosp Day # 8 PCP FILEMON STALEY MD     Subjective:     Sitting in a chair   No acute distress overnight  Now on 12 L NC      Objective:   Blood pressure 127/53, pulse 95, temperature 97.3 °F (36.3 °C), temperature source Temporal, resp. rate (!) 29, weight 270 lb 11.6 oz (122.8 kg), SpO2 96%, not currently breastfeeding.    GEN: AAOx3, NAD  HEENT: EOMI, PERRLA, no injection or icterus, oral mucosa moist, no oral lesions. No lymphadenopathy. No facial rash  CVS: RRR, no murmurs rubs or gallops. Equal 2+ distal pulses.   LUNGS: on high flow oxygen  ABDOMEN:  soft NT/ND, +BS, no HSM  SKIN: No rashes or skin lesions. No nail findings  MSK:  No swelling or synovitis on exam  NEURO: Cranial nerves II-XII intact grossly. 5/5 strength throughout in both upper and lower extremities, sensation intact.  PSYCH: normal mood      Results:   Lab Results   Component Value Date    WBC 11.2 (H) 2024    HGB 12.2 2024    HCT 38.2 2024    .0 2024    CREATSERUM 1.16 (H) 2024    BUN 42 (H) 2024     2024    K 4.8 2024     2024    CO2 27.0 2024     (H) 2024    CA 9.0 2024    ALB 3.7 2024    ALKPHO 106 2024    BILT 0.5 2024    TP 8.1 2024    AST 19 2024    ALT 14 2024    PTT 28.6 2024    TSH 1.790 2020    DDIMER 1.42 (H) 2024    ESRML >130 (H) 2024    CRP 4.30 (H) 2024    MG 2.6 2024    PHOS 4.3 2024    TROPHS 13 2024       XR CHEST AP PORTABLE  (CPT=71045)    Result Date: 3/4/2024  CONCLUSION: Extensive bilateral pulmonary opacities are similar or slightly increased compared to the prior exam.    Dictated by (CST): Anand Guzman MD on 3/04/2024 at 7:37  AM     Finalized by (CST): Anand Guzman MD on 3/04/2024 at 7:39 AM               Assessment & Plan:       Acute on chronic hypoxic respiratory failure secondary to Sjogren vs Scleroderma lung disease   - Prior history of sarcoidosis where she was treated with corticosteroids in the past  - Now blood work showing a positive SSA and centromere.  She has no other symptoms of Sjogren's or scleroderma but recent CT findings are showing extensive GGO and upper lobe with fibrosis, these findings could be seen in connective tissue disease specifically sjogrens  - now on 12 L NC but still has sob at rest   - She remains on IV Solu-Medrol 60 mg every 6 hours  - Started Myfortic 360 mg BID 3/2/24  - discussed with neurology, patient's family and patient regarding starting rituximab at 1000 mg 2 doses 2 weeks apart.  They were agreeable.  Plan to place order tomorrow  - also found to have a small to moderate left pleural effusion, pulmonology tried to do a thoracentesis but there was not enough fluid  - echocardiogram done 2/27 normal     Positive ADELAIDE, SSA and centromere  - She has no other symptoms of Sjogren's or scleroderma.    Will continue to follow. In the process of trying to transfer to Pema Hoff MD  3/5/2024

## 2024-03-06 NOTE — PROGRESS NOTES
[facility]  part of [facility]    Progress Note    Freya Martinez Patient Status:  Inpatient    1955 MRN [id number]   Location [facility] 2W/SW Attending Pb Mendieta MD   Hosp Day # 9 PCP FILEMON STALEY MD     Subjective:     Sitting in a chair   No acute distress overnight  Now on 12 L NC      Objective:   Blood pressure 119/56, pulse 94, temperature 96.6 °F (35.9 °C), temperature source Temporal, resp. rate (!) 30, weight 270 lb 11.6 oz (122.8 kg), SpO2 95%, not currently breastfeeding.    GEN: AAOx3, NAD  HEENT: EOMI, PERRLA, no injection or icterus, oral mucosa moist, no oral lesions. No lymphadenopathy. No facial rash  CVS: RRR, no murmurs rubs or gallops. Equal 2+ distal pulses.   LUNGS: on high flow oxygen  ABDOMEN:  soft NT/ND, +BS, no HSM  SKIN: No rashes or skin lesions. No nail findings  MSK:  No swelling or synovitis on exam  NEURO: Cranial nerves II-XII intact grossly. 5/5 strength throughout in both upper and lower extremities, sensation intact.  PSYCH: normal mood      Results:   Lab Results   Component Value Date    WBC 9.9 2024    HGB 12.1 2024    HCT 41.3 2024    .0 2024    CREATSERUM 1.08 (H) 2024    BUN 37 (H) 2024     (L) 2024    K 5.3 (H) 2024     2024    CO2 22.0 2024     (H) 2024    CA 9.0 2024    ALB 3.7 2024    ALKPHO 106 2024    BILT 0.5 2024    TP 8.1 2024    AST 19 2024    ALT 14 2024    PTT 28.6 2024    TSH 1.790 2020    DDIMER 1.42 (H) 2024    ESRML >130 (H) 2024    CRP 4.30 (H) 2024    MG 2.6 2024    PHOS 4.0 2024    TROPHS 13 2024       No results found.        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  She has no other symptoms of Sjogren's or scleroderma but recent CT findings are showing extensive GGO and upper lobe with fibrosis, these findings could be seen in connective tissue disease specifically sjogrens  - now on 12 L NC but still has sob at rest   - She remains on IV Solu-Medrol 60 mg every 6 hours  - Started Myfortic 360 mg BID 3/2/24  - discussed with neurology, patient's family and patient regarding starting rituximab at 1000 mg 2 doses 2 weeks apart.  They were agreeable.  Orders placed today   - also found to have a small to moderate left pleural effusion, pulmonology tried to do a thoracentesis but there was not enough fluid  - echocardiogram done 2/27 normal     Positive ADELAIDE, SSA and centromere  - She has no other symptoms of Sjogren's or scleroderma.    Will continue to follow. In the process of trying to transfer to Pema Hoff MD  3/6/2024

## 2024-03-06 NOTE — PLAN OF CARE
Uneventful shift. No changes in respiratory status - remains on HFNC @ 12lpm, +dyspnea with minimal activity.     Problem: CARDIOVASCULAR - ADULT  Goal: Maintains optimal cardiac output and hemodynamic stability  Description: INTERVENTIONS:  - Monitor vital signs, rhythm, and trends  - Monitor for bleeding, hypotension and signs of decreased cardiac output  - Evaluate effectiveness of vasoactive medications to optimize hemodynamic stability  - Monitor arterial and/or venous puncture sites for bleeding and/or hematoma  - Assess quality of pulses, skin color and temperature  - Assess for signs of decreased coronary artery perfusion - ex. Angina  - Evaluate fluid balance, assess for edema, trend weights  Outcome: Progressing     Problem: RESPIRATORY - ADULT  Goal: Achieves optimal ventilation and oxygenation  Description: INTERVENTIONS:  - Assess for changes in respiratory status  - Assess for changes in mentation and behavior  - Position to facilitate oxygenation and minimize respiratory effort  - Oxygen supplementation based on oxygen saturation or ABGs  - Provide Smoking Cessation handout, if applicable  - Encourage broncho-pulmonary hygiene including cough, deep breathe, Incentive Spirometry  - Assess the need for suctioning and perform as needed  - Assess and instruct to report SOB or any respiratory difficulty  - Respiratory Therapy support as indicated  - Manage/alleviate anxiety  - Monitor for signs/symptoms of CO2 retention  Outcome: Progressing     Problem: SKIN/TISSUE INTEGRITY - ADULT  Goal: Skin integrity remains intact  Description: INTERVENTIONS  - Assess and document risk factors for pressure ulcer development  - Assess and document skin integrity  - Monitor for areas of redness and/or skin breakdown  - Initiate interventions, skin care algorithm/standards of care as needed  Outcome: Progressing

## 2024-03-07 PROBLEM — J84.9 INTERSTITIAL LUNG DISEASE (HCC): Status: ACTIVE | Noted: 2024-01-01

## 2024-03-07 NOTE — PROGRESS NOTES
[facility]  part of [facility]    Reason for Consult: Scleroderma vs SS ILD (+ADELAIDE, +SSA, +Centromere) w/ Sarcoidosis/ILD (biopsy-proven)      Medication History:  Patient sarcoidosis treated with corticosteroids with occasional corticosteroid bursts for flares.  She has never been on DMARD/Biologics.    Progress Note    Freya Martinez Patient Status:  Inpatient    1955 MRN [id number]   Location [facility] 2W/SW Attending Pb Mendieta MD   Hosp Day # 10 PCP FILEMON STALEY MD     Subjective:     Tolerated Rituxan yesterday without issue  Otherwise, no acute events overnight  Remains on 12L O2  Reporting cough      Objective:   Blood pressure 136/63, pulse 93, temperature 98 °F (36.7 °C), temperature source Temporal, resp. rate (!) 34, weight 270 lb 11.6 oz (122.8 kg), SpO2 92%, not currently breastfeeding.    GEN: AAOx3, NAD  HEENT: EOMI, PERRLA, no injection or icterus, oral mucosa moist, no oral lesions. No lymphadenopathy. No facial rash  CVS: RRR, no murmurs rubs or gallops. Equal 2+ distal pulses.   LUNGS: on high flow oxygen  ABDOMEN:  soft NT/ND, +BS, no HSM  SKIN: No rashes or skin lesions. No nail findings  MSK:  No swelling or synovitis on exam  NEURO: Cranial nerves II-XII intact grossly. 5/5 strength throughout in both upper and lower extremities, sensation intact.  PSYCH: normal mood      Results:   Lab Results   Component Value Date    WBC 13.8 (H) 2024    HGB 12.3 2024    HCT 41.1 2024    .0 2024    CREATSERUM 1.22 (H) 2024    BUN 48 (H) 2024     2024    K 5.0 2024     2024    CO2 23.0 2024     (H) 2024    CA 8.8 2024    ALB 3.7 2024    ALKPHO 132 2024    BILT 0.2 2024    TP 7.4 2024    AST 37 (H) 2024    ALT 74 (H) 2024    PTT 28.6 2024    TSH 1.790 2020    DDIMER 1.42 (H) 2024    ESRML >130 (H) 2024    CRP  4.30 (H) 02/13/2024    MG 2.6 03/06/2024    PHOS 4.0 03/06/2024    TROPHS 13 02/26/2024       No results found.        RELEVANT INPATIENT LABS:       2/28/2024:  Respiratory viral panel negative     RF 11 WNL  ADELAIDE by LASHAWN positive, ADELAIDE by IFA 1:320 anti-centromere,   C3 179.6 (high), C4 40.3 (high)  DsDNA 1.1 WNL  APS Panel +Lupus AC (patient not on AC), negative beta-2 glycoprotein, negative cardiolipin  CCP 1.4 WNL, RF 11 WNL     TB Testing negative  Acute Hepatitis Panel Negative     CBC with leukocytosis WBC 15.2 (neutrophilic predominance), normal Hg,   CMP with normal CR 0.96, nonelevated LFTs, no gamma gap noted     2/27/2004:  Sputum culture: No WBC, 1+ GPC  ESR greater than 130   (high)     2/26/24:  D-dimer 1.42 (high)  BNP 18 WNL  Troponin 13 negative x 1  Pro-Jonh less than 0.04  Imaging:     3/4/24 CXR:   Impression   CONCLUSION: Extensive bilateral pulmonary opacities are similar or slightly increased compared to the prior exam.     2/27/2024 chest ultrasound:     FINDINGS:  There is a small left pleural effusion.  Thoracentesis was not performed by Dr. Mckeon since the amount of fluid was not that significant.               Impression   CONCLUSION:  1. Small left pleural effusion.  Thoracentesis was not performed by Dr. Mckeon.      2/26/24 CT Chest PE AORTA:       FINDINGS:  CARDIAC: The heart is within normal limits of size.  There are coronary artery calcifications (3:69).  No pericardial effusion.  MEDIASTINUM/CHRISTINE: No mass or enlarged adenopathy.  There is been slight interval increase in size of a few nonenlarged mediastinal lymph nodes with a right paratracheal lymph node measuring 6 mm (3:32), previously 4 mm.  There is a 7 mm right lower hilar   lymph node, previously 5 mm.  LUNGS: There is been mild interval increase in the dense consolidation involving the left lower lobe.  There is opacification of multiple segmental and subsegmental bronchi involving the left lower  lobe.  There has been interval increase in the  multifocal ground-glass opacities throughout both lungs (3:71).  There is unchanged multifocal bronchiectasis, which is most notable in the bilateral upper lobes (3:39).  The previously demonstrated consolidations involving the bilateral upper lobes,  lingula, refer periphery of the right lower lobe, and right middle lobe are again seen.  No evidence of pulmonary edema.  The previously described pulmonary nodules not well seen on this study.  VASCULATURE: The main pulmonary artery measures 3.0 cm.  No acute pulmonary embolism through the segmental pulmonary arteries.  THORACIC AORTA: The ascending aorta is normal in caliber.  There is a 2 vessel aortic arch.  Mild atherosclerotic calcification of the aortic arch.  No dissection.  PLEURA: There is a moderate left pleural effusion (6:119), which has increased in size when compared to 02/13/2024.  No right pleural effusion.  No pneumothorax.  CHEST WALL: No axillary mass or enlarged adenopathy.    LIMITED ABDOMEN: Gallbladder is surgically absent.  BONES: No acute fracture.  No aggressive osseous lesion.  There are multilevel degenerative changes of the thoracic spine.  There is an 11 mm right glenoid subchondral cyst (3:17).  Mild right greater than left glenohumeral joint osteoarthrosis.  OTHER: Negative.                 Impression   CONCLUSION:     1. No acute pulmonary embolism through the segmental level.  2. Multiple new ground-glass opacities throughout both lungs, which may reflect multifocal pneumonia, alveolar sarcoidosis, or less likely other etiologies.  3. Mild interval increase in the left lower lobe consolidation, which is concerning for pneumonia.  There is again seen opacification of multiple left lower lobe segmental and subsegmental bronchi, which may reflect mucous plugging.  4. Enlarging moderate left pleural effusion.  5. Redemonstrated upper lobe predominant bronchiectasis with fibrosis and  multifocal nodular consolidations involving both lungs, which were present on the prior CT chest dated 02/13/2024 and may be secondary to sarcoidosis, or other  infectious/inflammatory etiologies.  6. Coronary artery calcifications.  7. Lesser incidental findings described above.        2/26/24 CXR:  FINDINGS:  CARDIAC/VASC: Borderline cardiomegaly.  MEDIAST/CHRISTINE:   Atherosclerotic aorta with no visible aneurysm.    LUNGS/PLEURA: Extensive bilateral mixed alveolar and interstitial multifocal airspace opacification with bibasilar pleural reaction and peripheral pleural reaction left mid hemithorax that has progressed  BONES: Mild scoliosis with mild degenerative disc disease and spondylosis.    OTHER: Negative.                 Impression   CONCLUSION:  1. Borderline cardiomegaly .  Atherosclerotic aorta.  2. Extensive bilateral mixed alveolar and interstitial multifocal airspace disease with slight progression.  3. Slight progression of bilateral pleural reaction worse on the left at the bases      2/13/24 HRCT:  FINDINGS:  CARDIAC: No enlargement, pericardial thickening.  There are moderate coronary artery calcifications.  MEDIASTINUM/CHRISTINE: No mass or enlarged adenopathy.    LUNGS/PLEURA: Central airways are patent.  There is consolidation with air bronchograms in the left lower lobe.  There is multifocal alveolar opacity, which is in a predominantly peribronchial distribution, throughout both lungs.  Some areas of opacity  are somewhat nodular in appearance.  For example, there is an 8 millimeter nodule in the right lower lobe laterally (series 4, image 60).     There is a small to moderate left pleural effusion     VASCULATURE: Main pulmonary artery is not enlarged.  There is a right sided aortic arch, without ascending thoracic aortic aneurysm.    THORACIC AORTA: Normal size for age.  No aneurysm.    CHEST WALL: No mass or enlarged adenopathy.    LIMITED ABDOMEN: Limited images of the upper abdomen are  unremarkable.    BONES: There is multilevel degenerative disease of the spine.  OTHER: UnremarkableThere are flowing ventral osteophytes at multiple consecutive levels, compatible with diffuse idiopathic skeletal hyperostosis (DISH).     HRCT:   No groundglass changes to suggest air-trapping.  No bronchiectasis or bronchiolectasis.  No interstitial lung changes or architectural distortion.                 Impression   CONCLUSION:  1. HRCT demonstrates extensive bilateral multifocal airspace opacities, predominantly peribronchial in distribution.  Finding is thought to relate to underlying sarcoidosis.  Areas of multifocal more confluent opacity, including at the left lung base may   be secondary to the same process.  Superimposed pneumonia or developing chronic organizing pneumonia are differential considerations.  Attention on follow-up CT chest in 3 to 6 months.       2. Exam also demonstrates 8 millimeter nodule in the right lower lobe, thought to relate to underlying sarcoidosis, with other etiologies within the differential at this time..  Recommend attention on follow-up CT chest.     3. Small to moderate left pleural effusion.  Nonspecific , but can also be secondary to sarcoidosis.       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   - Prior history of sarcoidosis where she was treated with corticosteroids in the past  -+SSA, Centromere w/o other fx of Sjogren of Scleroderma but recent CT with extensive GGO and Upper lobe with fibrosis that can be seen in CTD (specifically, SS)   #Small to Moderate L Pleural Effusion  - found to have a small to moderate left pleural effusion during admisssion, pulmonology tried to do a thoracentesis but there was not enough fluid  #RLL 8 mm Nodule     Chronic:  # Asthma  # ELPIDIO on CPAP with poor compliance  # CHF  # HTN      PLAN:  -IV Solu-Medrol 60 mg every 6 hours (3/1- present)       -s/p IV  Solumedrol 40 mg x1 2/27, IV Solumedrol 60 mg q8h 2/27-3/1  - Myfortic 360 mg BID 3/2/24       -Pending patient tolerance, will consider increase to 750 mg BID 3/9  - After discussion with neurology, patient's family, and patient: rituximab at 1000 mg 2 doses 2 weeks apart with first dose 3/6 (next dose: 3/20).    -Pending transfer to an outside facility        Will continue to follow. In the process of trying to transfer to Pema Cloud DO  3/7/2024   8:33 AM

## 2024-03-07 NOTE — PROGRESS NOTES
[facility]  part of [facility]    Progress Note      Assessment and Plan:   1.  Acute on chronic respiratory failure-the patient is covered for possible pneumonia with cefepime.  She is being treated for severe progressive sarcoidosis with high-dose methylprednisolone and mycophenolate.  The patient has been explicit and requesting transfer to [place].  The patient has positive ADELAIDE, positive lupus anticoagulant and positive anticentromere antibody.    Recommendations:  1.  Continue current medical regime  2.  Taper oxygen  3.  Await bed availability  4.  Will follow clinically.  5.  Rituxan considered per rheumatology    2.  Obstructive sleep apnea-PAP therapy nightly    3.  DVT prophylaxis-subcutaneous heparin      Subjective:   Freya Martinez is a(n) 68 year old female who is breathing about the same    Objective:   Blood pressure 148/59, pulse 100, temperature 96.5 °F (35.8 °C), temperature source Temporal, resp. rate (!) 46, weight 280 lb (127 kg), SpO2 (!) 88%, not currently breastfeeding.    Physical Exam alert obese black female  HEENT examination is unremarkable with pupils equal round and reactive to light and accommodation.   Neck without adenopathy, thyromegaly, JVD nor bruit.   Lungs slightly diminished with few scattered crackle to auscultation and percussion.  Cardiac regular rate and rhythm no murmur.   Abdomen nontender, without hepatosplenomegaly and no mass appreciable.   Extremities without clubbing cyanosis nor edema.   Neurologic grossly intact with symmetric tone and strength and reflex.  Skin without gross abnormality     Results:     Lab Results   Component Value Date    WBC 13.8 03/07/2024    HGB 12.3 03/07/2024    HCT 41.1 03/07/2024    .0 03/07/2024    CREATSERUM 1.22 03/07/2024    BUN 48 03/07/2024     03/07/2024    K 5.0 03/07/2024     03/07/2024    CO2 23.0 03/07/2024     03/07/2024    CA 8.8 03/07/2024    ALB 3.7 03/07/2024    ALKPHO 132  03/07/2024    BILT 0.2 03/07/2024    TP 7.4 03/07/2024    AST 37 03/07/2024    ALT 74 03/07/2024     Chest x-ray-diffuse granular infiltrate    Robert Rawls MD  Medical Director, Critical Care, [facility]  Medical Director, [facility]  Pager: [phone]

## 2024-03-07 NOTE — PLAN OF CARE
Pt A&Ox4 on 12L HFNC. No acute events overnight. Bed locked in lowest position, call light within reach.  at bedside safety maintained.   Problem: Patient Centered Care  Goal: Patient preferences are identified and integrated in the patient's plan of care  Description: Interventions:  - What would you like us to know as we care for you? I live at home with my .  - Provide timely, complete, and accurate information to patient/family  - Incorporate patient and family knowledge, values, beliefs, and cultural backgrounds into the planning and delivery of care  - Encourage patient/family to participate in care and decision-making at the level they choose  - Honor patient and family perspectives and choices  Outcome: Progressing     Problem: Patient/Family Goals  Goal: Patient/Family Long Term Goal  Description: Patient's Long Term Goal: To go home    Interventions:  - Titrate oxygen down  - Medication compliance  - Follow provider recommendations  - See additional Care Plan goals for specific interventions  Outcome: Progressing  Goal: Patient/Family Short Term Goal  Description: Patient's Short Term Goal: To breathe easier    Interventions:   - Oxygen therapy  - IV solumedrol  - Nebulizer treatments  - Further testing  - Follow provider recommendations  - See additional Care Plan goals for specific interventions  Outcome: Progressing     Problem: CARDIOVASCULAR - ADULT  Goal: Maintains optimal cardiac output and hemodynamic stability  Description: INTERVENTIONS:  - Monitor vital signs, rhythm, and trends  - Monitor for bleeding, hypotension and signs of decreased cardiac output  - Evaluate effectiveness of vasoactive medications to optimize hemodynamic stability  - Monitor arterial and/or venous puncture sites for bleeding and/or hematoma  - Assess quality of pulses, skin color and temperature  - Assess for signs of decreased coronary artery perfusion - ex. Angina  - Evaluate fluid balance, assess for  edema, trend weights  Outcome: Progressing  Goal: Absence of cardiac arrhythmias or at baseline  Description: INTERVENTIONS:  - Continuous cardiac monitoring, monitor vital signs, obtain 12 lead EKG if indicated  - Evaluate effectiveness of antiarrhythmic and heart rate control medications as ordered  - Initiate emergency measures for life threatening arrhythmias  - Monitor electrolytes and administer replacement therapy as ordered  Outcome: Progressing     Problem: RESPIRATORY - ADULT  Goal: Achieves optimal ventilation and oxygenation  Description: INTERVENTIONS:  - Assess for changes in respiratory status  - Assess for changes in mentation and behavior  - Position to facilitate oxygenation and minimize respiratory effort  - Oxygen supplementation based on oxygen saturation or ABGs  - Provide Smoking Cessation handout, if applicable  - Encourage broncho-pulmonary hygiene including cough, deep breathe, Incentive Spirometry  - Assess the need for suctioning and perform as needed  - Assess and instruct to report SOB or any respiratory difficulty  - Respiratory Therapy support as indicated  - Manage/alleviate anxiety  - Monitor for signs/symptoms of CO2 retention  Outcome: Progressing     Problem: SKIN/TISSUE INTEGRITY - ADULT  Goal: Skin integrity remains intact  Description: INTERVENTIONS  - Assess and document risk factors for pressure ulcer development  - Assess and document skin integrity  - Monitor for areas of redness and/or skin breakdown  - Initiate interventions, skin care algorithm/standards of care as needed  Outcome: Progressing  Goal: Oral mucous membranes remain intact  Description: INTERVENTIONS  - Assess oral mucosa and hygiene practices  - Implement preventative oral hygiene regimen  - Implement oral medicated treatments as ordered  Outcome: Progressing

## 2024-03-07 NOTE — PROGRESS NOTES
[facility]     Hospitalist Progress Note     Freya Martinez Patient Status:  Inpatient    1955 MRN [id number]   Location [facility]5W Attending Pb Mendieta M MD   Hosp Day # 10 PCP FILEMON STALEY MD     Subjective:     Patient sitting up in chair  No acute events overnight  Remains on 12 L HF, saturating around 88%  No acute resp distress  Currently receiving neb tx   No cp, f,c,n,v abd pain or HA     No acute overnight events reported by the nursing staff.       Objective:    Review of Systems:   ROS completed; pertinent positive and negatives stated in subjective.      Vital signs:  Temp:  [96.5 °F (35.8 °C)-98 °F (36.7 °C)] 96.5 °F (35.8 °C)  Pulse:  [] 100  Resp:  [18-46] 46  BP: (112-148)/(56-63) 148/59  SpO2:  [85 %-98 %] 88 %      Physical Exam:    Gen: NAD AO x3  Chest: coarse BS b/l  CVS: normal s1 and s2 RR  Abd: NABS soft NT ND  Neuro: CN 2-12 grossly intact  Ext: no edema in bilateral LE      Diagnostic Data:    Labs:  Recent Labs   Lab 24  0338 24  0508 24  0422 24  0805 24  0351   WBC 11.1* 9.7 11.2* 9.9 13.8*   HGB 11.2* 11.9* 12.2 12.1 12.3   MCV 78.4* 80.9 78.8* 83.6 80.3   .0 275.0 269.0 252.0 262.0       Recent Labs   Lab 24  0422 24  0805 24  0351   * 114* 132*   BUN 42* 37* 48*   CREATSERUM 1.16* 1.08* 1.22*   CA 9.0 9.0 8.8   ALB 3.7 3.7 3.7    134* 136   K 4.8 5.3* 5.0    106 110   CO2 27.0 22.0 23.0   ALKPHO  --   --  132   AST  --   --  37*   ALT  --   --  74*   BILT  --   --  0.2   TP  --   --  7.4       Estimated Creatinine Clearance: 43.5 mL/min (A) (based on SCr of 1.22 mg/dL (H)).    No results for input(s): \"PTP\", \"INR\" in the last 168 hours.             Imaging: Imaging data reviewed in Epic.    Medications:    amLODIPine  5 mg Oral Daily    sulfamethoxazole-trimethoprim DS  1 tablet Oral Once per day on     mycophenolate sodium  360 mg Oral BID AC     ipratropium-albuterol  3 mL Nebulization 4 times per day    methylPREDNISolone  60 mg Intravenous Q6H    cefepime  1 g Intravenous Q8H    fluticasone propionate  1 spray Each Nare Daily    pantoprazole  40 mg Oral QAM AC    heparin  7,500 Units Subcutaneous Q8H LÓPEZ    fluticasone furoate-vilanterol  1 puff Inhalation Daily       Assessment & Plan:     Acute on chronic respiratory failure 2/2 pulmonary sarcoidosis/ILD and COPD. Possible asthma exacerbation  CXR reviewed  CT chest negative for PE  BNP 18, Dimer 1.42, Procal negative  ?atypical infection -> expanded resp panel neg  Pulm and Rheum on consult   Cont Solumedrol 60 mg q6hrs, duonebs  US guided thoracentesis -> hold for now  Echo with preserved EF and no WMA  Hold diuresis at this time  Started on mycophenolate, rituximab    Strict Is and Os, daily weights  Cont to monitor in PCU on HFNC   Awaiting transfer to [facility]  ELPIDIO  CPAP - unable to tolerate most nights  Continue protocol  Morbid obesity  BMI 42  Counseled on making healthy lifestyle and dietary changes  HTN  BP well controlled  Continue home hydrochlorothiazide  Lisinopril changed to amlodipine given hyperkalemia  Monitor vitals  5.    Hyperkalemia   1.    Stop lisinopril   2.    Monitor labs      Advanced care planning  Full code  Plan of care discussed with patient or family at bedside.  Awaiting bed at [facility]       Supplementary Documentation:     Quality:  DVT Prophylaxis: Heparin s/c  CODE status: Full      Estimated date of discharge: TBD  Discharge is dependent on: clinical stability  At this point Ms. Martinez is expected to be discharge to: home    Chart reviewed, including current vitals, notes, labs and imaging  Pertinent past medical records reviewed  Labs ordered and medications adjusted as outlined above  Coordinate care with care team/consultants  Discussed with patient and family at bedside results of tests, management plan as outlined above, and the need for ongoing  hospitalization  D/w RN       MDM: High

## 2024-03-07 NOTE — SPIRITUAL CARE NOTE
Spiritual Care Visit Note    Patient Name: Freya Martinez Date of Spiritual Care Visit: 24   : 1955 Primary Dx: Acute on chronic respiratory failure with hypoxia (HCC)       Referred By: Referral From: Nurse    Spiritual Care Taxonomy:    Intended Effects: Aligning care plan with patient's values    Methods: Demonstrate acceptance;Offer support    Interventions: Acknowledge current situation;Assist someone with Advance Directives;Provide hospitality    Visit Type/Summary:     - PoA: New PoAH Created: Visited patient in response to PoA for Healthcare consult/request. Created a new PoAH for Healthcare document that, per the patient, accurately reflects their wishes. Pt named son, Roger Martinez ([phone]), as primary agent. Pt named mom, Bubba Yang ([phone]), as secondary agent. Gave the patient the original PoAH document along with copies. Confirmed that the PoAH primary agent name and contact information have been entered into the Epic, Advance Directives section. A copy of the new PoAH document has been placed on the patient's paper chart.    Spiritual Care support can be requested via an Epic consult. For urgent/immediate needs, please contact the On Call  at: [place]: ext [phone]    Nicol Mac MA, MA   Chaplain Resident  [facility], Spiritual Care Department   On-Call  via EPIC consult

## 2024-03-07 NOTE — TRANSFER CENTER NOTE
11:10 AM:  Called [facility] and still no ICU beds.    11:30 AM:  Called [facility] and spoke to Courtney.  They are short on beds but she took pt info.  Faxed face sheet and provided Dr. Mckeon's cell number.    1:00 PM: Called [facility] and they are on transfer hold.  They did not even take any info.    1:35 PM:  Called [facility] and spoke to [facility].  No ICU beds at this time.  He took the info.    Faxed the face sheet and gave Dr. Mckeon's cell number  for a P2P discussion.      3:20 PM:  [facility] spoke to Dr. Rawls and pt may be a candidate for a lung transplant.  Thus, [facility] is NOT an option because they do NOT do lung transplants.      Per [facility]  is in the chart ready to go with the pt.

## 2024-03-07 NOTE — OCCUPATIONAL THERAPY NOTE
Nurse approves pt participation session. Upon arriving to room, pt seated in bed side chair with LE s partially elevated. Pt on 12L HFO2 with sats 83-85%. (Per nurse, pt's typical resting sat ~93%) Pt reports she recently returned from the bathroom and was still recovering from the activity.     Pt receptive to education on breathing strategies to assist in recovery. Pt verbalizes understanding of suggested HEP, requiring unsupported sitting for core strengthening with emphasis on proper posture and chest mobs.     Plan to continued skilled OT as appropriate while hospitalized.

## 2024-03-08 NOTE — PROGRESS NOTES
[facility]     Hospitalist Progress Note     Freya Martinez Patient Status:  Inpatient    1955 MRN [id number]   Location [facility]5W Attending Pb Mendieta M MD   Hosp Day # 11 PCP FILEMON STALEY MD     Subjective:     Patient laying in bed, extremely short of breath and ill appearing.   Family at the bedside - all questions and concerns addressed.   Remains on HFNC    No acute overnight events reported by the nursing staff.       Objective:    Review of Systems:   ROS completed; pertinent positive and negatives stated in subjective.      Vital signs:  Temp:  [96.7 °F (35.9 °C)-98.8 °F (37.1 °C)] 98.4 °F (36.9 °C)  Pulse:  [] 91  Resp:  [28-47] 28  BP: (124-181)/(51-86) 172/82  SpO2:  [85 %-94 %] 88 %  FiO2 (%):  [80 %-90 %] 90 %      Physical Exam:    Gen: NAD AO x3  Chest: coarse BS b/l  CVS: normal s1 and s2 RR  Abd: NABS soft NT ND  Neuro: CN 2-12 grossly intact  Ext: no edema in bilateral LE      Diagnostic Data:    Labs:  Recent Labs   Lab 24  0508 24  0422 24  0824  03524  0425   WBC 9.7 11.2* 9.9 13.8* 15.1*   HGB 11.9* 12.2 12.1 12.3 12.6   MCV 80.9 78.8* 83.6 80.3 80.9   .0 269.0 252.0 262.0 235.0       Recent Labs   Lab 24  0805 24  0351 24  0425   * 132* 138*   BUN 37* 48* 43*   CREATSERUM 1.08* 1.22* 0.96   CA 9.0 8.8 8.8   ALB 3.7 3.7 3.6   * 136 139   K 5.3* 5.0 5.4*    110 109   CO2 22.0 23.0 26.0   ALKPHO  --  132 154*   AST  --  37* 61*   ALT  --  74* 120*   BILT  --  0.2 0.3   TP  --  7.4 7.4       Estimated Creatinine Clearance: 55.3 mL/min (based on SCr of 0.96 mg/dL).    No results for input(s): \"PTP\", \"INR\" in the last 168 hours.             Imaging: Imaging data reviewed in Epic.    Medications:    furosemide  20 mg Intravenous BID (Diuretic)    LORazepam  0.5 mg Intravenous Once    amLODIPine  5 mg Oral Daily    sulfamethoxazole-trimethoprim DS  1 tablet Oral Once per day on  Monday Wednesday Friday    mycophenolate sodium  360 mg Oral BID AC    ipratropium-albuterol  3 mL Nebulization 4 times per day    methylPREDNISolone  60 mg Intravenous Q6H    fluticasone propionate  1 spray Each Nare Daily    pantoprazole  40 mg Oral QAM AC    heparin  7,500 Units Subcutaneous Q8H LÓPEZ    fluticasone furoate-vilanterol  1 puff Inhalation Daily       Assessment & Plan:     Acute on chronic respiratory failure 2/2 pulmonary sarcoidosis/ILD and COPD. Possible asthma exacerbation  CXR reviewed  CT chest negative for PE  BNP 18, Dimer 1.42, Procal negative  ?atypical infection -> expanded resp panel neg  Pulm and Rheum on consult   Cont Solumedrol 60 mg q6hrs, duonebs  US guided thoracentesis -> hold for now  Echo with preserved EF and no WMA  Hold diuresis at this time  Started on mycophenolate, rituximab    Strict Is and Os, daily weights  Cont to monitor in PCU on HFNC   Awaiting transfer to [facility]/tertiary care center  Family focusing on comfort care at this time as they are aware of her poor prognosis  ELPIDIO  CPAP - unable to tolerate most nights  Continue protocol  Morbid obesity  BMI 42  Counseled on making healthy lifestyle and dietary changes  HTN  BP well controlled  Continue home hydrochlorothiazide  Lisinopril changed to amlodipine given hyperkalemia  Monitor vitals  5.    Hyperkalemia   1.    Stop lisinopril   2.    Monitor labs      Advanced care planning  Full code  Plan of care discussed with patient or family at bedside.  Awaiting bed at [facility]       Supplementary Documentation:     Quality:  DVT Prophylaxis: Heparin s/c  CODE status: Full      Estimated date of discharge: TBD  Discharge is dependent on: clinical stability  At this point Ms. Martinez is expected to be discharge to: home    Chart reviewed, including current vitals, notes, labs and imaging  Pertinent past medical records reviewed  Labs ordered and medications adjusted as outlined above  Coordinate care with care  team/consultants  Discussed with patient and family at bedside results of tests, management plan as outlined above, and the need for ongoing hospitalization  D/w RN       MDM: High

## 2024-03-08 NOTE — PROGRESS NOTES
[facility]  part of [facility]    Progress Note    Freya Martinez Patient Status:  Inpatient    1955 MRN [id number]   Location [facility] 2W/SW Attending Jennifer Lundberg MD   Hosp Day # 11 PCP FILEMON STALEY MD         Subjective:     Constitutional:  Positive for fatigue. Negative for fever.   HENT:  Negative for congestion.    Respiratory:  Positive for cough and shortness of breath. Negative for wheezing.    Cardiovascular:  Positive for leg swelling. Negative for chest pain and palpitations.   Gastrointestinal: Negative.    Skin: Negative.    Neurological:  Negative for seizures.   Psychiatric/Behavioral:  Negative for confusion and agitation.      Patient was seen and examined  Up to the chair on high flow nasal cannula  Awake and alert and oriented x 3  No hemoptysis or colored sputum  Occasional white sputum  No fever  Generalized fatigue and tiredness  Getting more tired and dyspneic  Objective:   Blood pressure (!) 172/82, pulse 91, temperature 98.4 °F (36.9 °C), temperature source Temporal, resp. rate (!) 28, weight 280 lb (127 kg), SpO2 (!) 88%, not currently breastfeeding.  Physical Exam  Constitutional:       Appearance: She is obese. She is ill-appearing.   HENT:      Head: Normocephalic and atraumatic.      Nose: Nose normal.   Eyes:      General: No scleral icterus.     Pupils: Pupils are equal, round, and reactive to light.   Cardiovascular:      Rate and Rhythm: Normal rate.      Heart sounds:      No gallop.   Musculoskeletal:      Cervical back: Normal range of motion and neck supple. No rigidity.         Results:   Lab Results   Component Value Date    WBC 15.1 (H) 2024    HGB 12.6 2024    HCT 40.6 2024    .0 2024    CREATSERUM 0.96 2024    BUN 43 (H) 2024     2024    K 5.4 (H) 2024     2024    CO2 26.0 2024     (H) 2024    CA 8.8 2024    ALB 3.6 2024     ALKPHO 154 (H) 03/08/2024    BILT 0.3 03/08/2024    TP 7.4 03/08/2024    AST 61 (H) 03/08/2024     (H) 03/08/2024    PTT 28.6 02/28/2024    TSH 1.790 05/12/2020    DDIMER 1.42 (H) 02/26/2024    ESRML >130 (H) 02/27/2024    CRP 4.30 (H) 02/13/2024    MG 2.6 03/06/2024    PHOS 4.0 03/06/2024    TROPHS 13 02/26/2024       CXR today reviewed with extensive bilateral infiltrate    Assessment & Plan:     1- acute on chronic respiratory failure with hypoxia :   CT with no PE with extensive bilateral GGO's and chronic changes  CXR today worse /extensive bilateral infiltrate     - likely progression of ILD /secondary to autoimmune disease with positive SSA and anticentromere  , ? scleroderma /Sjogren's syndrome with pulmonary involvement which is likely the case since pulmonary involvement with the scleroderma usually poor response and poor prognosis     - ? secondary to pulmonary sarcoidosis (Diagnosed with pulm sarcoidosis 1990  by bronchoscopy and biopsy at [place] )      - underline asthma / ? Copd former smoker ( qquit 1990 )        Normal procalcitonin and normal BNP   Significant elevation in ESR      Normal ANCA   QuantiFERON gold TB negative  Normal Aspergillus galactomannan  Blastomyces serology negative  Low ACE -I level   +LASHAWN , positive lupus anticoagulantp and anticentromere antibody     Plan :  Remain on high-dose steroid  Mycophenolate added on 3/1/2024  Completed empiric course of broad-spectrum antibiotics   PCP prophylaxis with Bactrim every other day  To start rituximab  O2 therapy with high flow on Airvo and BiPAP  Rheumatology following        2-ELPIDIO  CPAP nightly /compliant     3-DVT prophylaxis  Heparin subcu     4- hihg K   Will stop  ACE inhibitor changed to amlodipine  As needed Lasix    5-CODE STATUS  D/w pt at length today , and she is completely awake and alert and oriented x 4 with competent decision and she would not like to be placed on mechanical ventilation or having CPR on her wishes  to be DNR/select     High risk and complex case  Poor prognosis with no response to therapy.  Recommend transfer to tertiary care center  Again today discussed with transfer center   D/w staff   D/w pt at length today   35 min cct with pt today             Diann Mckeon MD  3/8/2024

## 2024-03-08 NOTE — OCCUPATIONAL THERAPY NOTE
Attempted to see pt for OT treatment today. Pt is extremely SOB and desaturating at rest in the 80's at times per RN. Awaiting transfer to another hospital. Will hold for today until pt able to safely participate with therapy.    Jonna Lomas, OTR/L

## 2024-03-08 NOTE — CM/SW NOTE
Patient has been accepted at [facility], but no bed available at this time. Pt is #8 on waiting list per transfer RN. Pt's condition worsening, now on Vapotherm 60L/80%.     Plan: Transfer to [facility] pending bed availability.    Riley Iqbal, BSN    [phone]

## 2024-03-08 NOTE — PLAN OF CARE
Patient remains resting in chair overnight, Cpap when sleeping. 12LHFNC. Frequent rounds made, call light within reach        Problem: Patient Centered Care  Goal: Patient preferences are identified and integrated in the patient's plan of care  Description: Interventions:  - What would you like us to know as we care for you? I live at home with my .  - Provide timely, complete, and accurate information to patient/family  - Incorporate patient and family knowledge, values, beliefs, and cultural backgrounds into the planning and delivery of care  - Encourage patient/family to participate in care and decision-making at the level they choose  - Honor patient and family perspectives and choices  Outcome: Progressing     Problem: Patient/Family Goals  Goal: Patient/Family Long Term Goal  Description: Patient's Long Term Goal: To go home    Interventions:  - Titrate oxygen down  - Medication compliance  - Follow provider recommendations  - See additional Care Plan goals for specific interventions  Outcome: Progressing  Goal: Patient/Family Short Term Goal  Description: Patient's Short Term Goal: To breathe easier    Interventions:   - Oxygen therapy  - IV solumedrol  - Nebulizer treatments  - Further testing  - Follow provider recommendations  - See additional Care Plan goals for specific interventions  Outcome: Progressing     Problem: CARDIOVASCULAR - ADULT  Goal: Maintains optimal cardiac output and hemodynamic stability  Description: INTERVENTIONS:  - Monitor vital signs, rhythm, and trends  - Monitor for bleeding, hypotension and signs of decreased cardiac output  - Evaluate effectiveness of vasoactive medications to optimize hemodynamic stability  - Monitor arterial and/or venous puncture sites for bleeding and/or hematoma  - Assess quality of pulses, skin color and temperature  - Assess for signs of decreased coronary artery perfusion - ex. Angina  - Evaluate fluid balance, assess for edema, trend  weights  Outcome: Progressing  Goal: Absence of cardiac arrhythmias or at baseline  Description: INTERVENTIONS:  - Continuous cardiac monitoring, monitor vital signs, obtain 12 lead EKG if indicated  - Evaluate effectiveness of antiarrhythmic and heart rate control medications as ordered  - Initiate emergency measures for life threatening arrhythmias  - Monitor electrolytes and administer replacement therapy as ordered  Outcome: Progressing     Problem: RESPIRATORY - ADULT  Goal: Achieves optimal ventilation and oxygenation  Description: INTERVENTIONS:  - Assess for changes in respiratory status  - Assess for changes in mentation and behavior  - Position to facilitate oxygenation and minimize respiratory effort  - Oxygen supplementation based on oxygen saturation or ABGs  - Provide Smoking Cessation handout, if applicable  - Encourage broncho-pulmonary hygiene including cough, deep breathe, Incentive Spirometry  - Assess the need for suctioning and perform as needed  - Assess and instruct to report SOB or any respiratory difficulty  - Respiratory Therapy support as indicated  - Manage/alleviate anxiety  - Monitor for signs/symptoms of CO2 retention  Outcome: Progressing     Problem: SKIN/TISSUE INTEGRITY - ADULT  Goal: Skin integrity remains intact  Description: INTERVENTIONS  - Assess and document risk factors for pressure ulcer development  - Assess and document skin integrity  - Monitor for areas of redness and/or skin breakdown  - Initiate interventions, skin care algorithm/standards of care as needed  Outcome: Progressing  Goal: Oral mucous membranes remain intact  Description: INTERVENTIONS  - Assess oral mucosa and hygiene practices  - Implement preventative oral hygiene regimen  - Implement oral medicated treatments as ordered  Outcome: Progressing

## 2024-03-08 NOTE — PLAN OF CARE
Problem: CARDIOVASCULAR - ADULT  Goal: Maintains optimal cardiac output and hemodynamic stability  Description: INTERVENTIONS:  - Monitor vital signs, rhythm, and trends  - Monitor for bleeding, hypotension and signs of decreased cardiac output  - Evaluate effectiveness of vasoactive medications to optimize hemodynamic stability  - Monitor arterial and/or venous puncture sites for bleeding and/or hematoma  - Assess quality of pulses, skin color and temperature  - Assess for signs of decreased coronary artery perfusion - ex. Angina  - Evaluate fluid balance, assess for edema, trend weights  Outcome: Progressing  Goal: Absence of cardiac arrhythmias or at baseline  Description: INTERVENTIONS:  - Continuous cardiac monitoring, monitor vital signs, obtain 12 lead EKG if indicated  - Evaluate effectiveness of antiarrhythmic and heart rate control medications as ordered  - Initiate emergency measures for life threatening arrhythmias  - Monitor electrolytes and administer replacement therapy as ordered  Outcome: Progressing     Problem: SKIN/TISSUE INTEGRITY - ADULT  Goal: Skin integrity remains intact  Description: INTERVENTIONS  - Assess and document risk factors for pressure ulcer development  - Assess and document skin integrity  - Monitor for areas of redness and/or skin breakdown  - Initiate interventions, skin care algorithm/standards of care as needed  Outcome: Progressing  Goal: Oral mucous membranes remain intact  Description: INTERVENTIONS  - Assess oral mucosa and hygiene practices  - Implement preventative oral hygiene regimen  - Implement oral medicated treatments as ordered  Outcome: Progressing

## 2024-03-08 NOTE — TRANSFER CENTER NOTE
11:30 AM:  Called [facility] and spoke to Aye.  Pt financially cleared however no beds.    Discussed case with Dr. Mckeon and pt has decided she does not want to be intubated and made a DNR.      12:05 PM:  Called [facility] and spoke to Ty  Pt has been accepted by Dr. Parmar in the MICU however no beds and pt is # 8 on the list to get a MICU bed.      6:00 PM: Called [facility] and spoke to  Alexia and still no beds in the MICU at [facility].  She remains # 8 on the list.     Called [facility] and spoke Kelsey.  Linda spoke to them  around 4:30 PM and said to cancel to the transfer because pt is unstable and has decided to be an DNR.

## 2024-03-08 NOTE — PROGRESS NOTES
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  She has never been on DMARD/Biologics.    Progress Note    Freya Martinez Patient Status:  Inpatient    1955 MRN [id number]   Location [facility] 2W/SW Attending Pb Mendieta MD   Hosp Day # 11 PCP FILEMON STALEY MD     Subjective:     Patient with conversational dyspnea, desatting to 80s even while sitting and communicating  Reports continued cough, nonproductive  On 12 L oxygen      Objective:   Blood pressure (!) 163/67, pulse 100, temperature 98.6 °F (37 °C), temperature source Temporal, resp. rate (!) 30, weight 280 lb (127 kg), SpO2 90%, not currently breastfeeding.    GEN: AAOx3, NAD  HEENT: EOMI, PERRLA, no injection or icterus, oral mucosa moist, no oral lesions. No lymphadenopathy. No facial rash  CVS: RRR, no murmurs rubs or gallops. Equal 2+ distal pulses.   LUNGS: on high flow oxygen  ABDOMEN:  soft NT/ND, +BS, no HSM  SKIN: No rashes or skin lesions. No nail findings  MSK:  No swelling or synovitis on exam  NEURO: Cranial nerves II-XII intact grossly. 5/5 strength throughout in both upper and lower extremities, sensation intact.  PSYCH: normal mood      Results:   Lab Results   Component Value Date    WBC 15.1 (H) 2024    HGB 12.6 2024    HCT 40.6 2024    .0 2024    CREATSERUM 0.96 2024    BUN 43 (H) 2024     2024    K 5.4 (H) 2024     2024    CO2 26.0 2024     (H) 2024    CA 8.8 2024    ALB 3.6 2024    ALKPHO 154 (H) 2024    BILT 0.3 2024    TP 7.4 2024    AST 61 (H) 2024     (H) 2024    PTT 28.6 2024    TSH 1.790 2020    DDIMER 1.42 (H) 2024     ESRML >130 (H) 02/27/2024    CRP 4.30 (H) 02/13/2024    MG 2.6 03/06/2024    PHOS 4.0 03/06/2024    TROPHS 13 02/26/2024       No results found.        RELEVANT INPATIENT LABS:       2/28/2024:  Respiratory viral panel negative     RF 11 WNL  ADELAIDE by LASHAWN positive, ADELAIDE by IFA 1:320 anti-centromere,   C3 179.6 (high), C4 40.3 (high)  DsDNA 1.1 WNL  APS Panel +Lupus AC (patient not on AC), negative beta-2 glycoprotein, negative cardiolipin  CCP 1.4 WNL, RF 11 WNL     TB Testing negative  Acute Hepatitis Panel Negative     CBC with leukocytosis WBC 15.2 (neutrophilic predominance), normal Hg,   CMP with normal CR 0.96, nonelevated LFTs, no gamma gap noted     2/27/2004:  Sputum culture: No WBC, 1+ GPC  ESR greater than 130   (high)     2/26/24:  D-dimer 1.42 (high)  BNP 18 WNL  Troponin 13 negative x 1  Pro-Jonh less than 0.04  Imaging:     3/4/24 CXR:   Impression   CONCLUSION: Extensive bilateral pulmonary opacities are similar or slightly increased compared to the prior exam.     2/27/2024 chest ultrasound:     FINDINGS:  There is a small left pleural effusion.  Thoracentesis was not performed by Dr. Mckeon since the amount of fluid was not that significant.               Impression   CONCLUSION:  1. Small left pleural effusion.  Thoracentesis was not performed by Dr. Mckeon.      2/26/24 CT Chest PE AORTA:       FINDINGS:  CARDIAC: The heart is within normal limits of size.  There are coronary artery calcifications (3:69).  No pericardial effusion.  MEDIASTINUM/CHRISTINE: No mass or enlarged adenopathy.  There is been slight interval increase in size of a few nonenlarged mediastinal lymph nodes with a right paratracheal lymph node measuring 6 mm (3:32), previously 4 mm.  There is a 7 mm right lower hilar   lymph node, previously 5 mm.  LUNGS: There is been mild interval increase in the dense consolidation involving the left lower lobe.  There is opacification of multiple segmental and subsegmental  bronchi involving the left lower lobe.  There has been interval increase in the  multifocal ground-glass opacities throughout both lungs (3:71).  There is unchanged multifocal bronchiectasis, which is most notable in the bilateral upper lobes (3:39).  The previously demonstrated consolidations involving the bilateral upper lobes,  lingula, refer periphery of the right lower lobe, and right middle lobe are again seen.  No evidence of pulmonary edema.  The previously described pulmonary nodules not well seen on this study.  VASCULATURE: The main pulmonary artery measures 3.0 cm.  No acute pulmonary embolism through the segmental pulmonary arteries.  THORACIC AORTA: The ascending aorta is normal in caliber.  There is a 2 vessel aortic arch.  Mild atherosclerotic calcification of the aortic arch.  No dissection.  PLEURA: There is a moderate left pleural effusion (6:119), which has increased in size when compared to 02/13/2024.  No right pleural effusion.  No pneumothorax.  CHEST WALL: No axillary mass or enlarged adenopathy.    LIMITED ABDOMEN: Gallbladder is surgically absent.  BONES: No acute fracture.  No aggressive osseous lesion.  There are multilevel degenerative changes of the thoracic spine.  There is an 11 mm right glenoid subchondral cyst (3:17).  Mild right greater than left glenohumeral joint osteoarthrosis.  OTHER: Negative.                 Impression   CONCLUSION:     1. No acute pulmonary embolism through the segmental level.  2. Multiple new ground-glass opacities throughout both lungs, which may reflect multifocal pneumonia, alveolar sarcoidosis, or less likely other etiologies.  3. Mild interval increase in the left lower lobe consolidation, which is concerning for pneumonia.  There is again seen opacification of multiple left lower lobe segmental and subsegmental bronchi, which may reflect mucous plugging.  4. Enlarging moderate left pleural effusion.  5. Redemonstrated upper lobe predominant  bronchiectasis with fibrosis and multifocal nodular consolidations involving both lungs, which were present on the prior CT chest dated 02/13/2024 and may be secondary to sarcoidosis, or other  infectious/inflammatory etiologies.  6. Coronary artery calcifications.  7. Lesser incidental findings described above.        2/26/24 CXR:  FINDINGS:  CARDIAC/VASC: Borderline cardiomegaly.  MEDIAST/CHRISTINE:   Atherosclerotic aorta with no visible aneurysm.    LUNGS/PLEURA: Extensive bilateral mixed alveolar and interstitial multifocal airspace opacification with bibasilar pleural reaction and peripheral pleural reaction left mid hemithorax that has progressed  BONES: Mild scoliosis with mild degenerative disc disease and spondylosis.    OTHER: Negative.                 Impression   CONCLUSION:  1. Borderline cardiomegaly .  Atherosclerotic aorta.  2. Extensive bilateral mixed alveolar and interstitial multifocal airspace disease with slight progression.  3. Slight progression of bilateral pleural reaction worse on the left at the bases      2/13/24 HRCT:  FINDINGS:  CARDIAC: No enlargement, pericardial thickening.  There are moderate coronary artery calcifications.  MEDIASTINUM/CHRISTINE: No mass or enlarged adenopathy.    LUNGS/PLEURA: Central airways are patent.  There is consolidation with air bronchograms in the left lower lobe.  There is multifocal alveolar opacity, which is in a predominantly peribronchial distribution, throughout both lungs.  Some areas of opacity  are somewhat nodular in appearance.  For example, there is an 8 millimeter nodule in the right lower lobe laterally (series 4, image 60).     There is a small to moderate left pleural effusion     VASCULATURE: Main pulmonary artery is not enlarged.  There is a right sided aortic arch, without ascending thoracic aortic aneurysm.    THORACIC AORTA: Normal size for age.  No aneurysm.    CHEST WALL: No mass or enlarged adenopathy.    LIMITED ABDOMEN: Limited images of  the upper abdomen are unremarkable.    BONES: There is multilevel degenerative disease of the spine.  OTHER: UnremarkableThere are flowing ventral osteophytes at multiple consecutive levels, compatible with diffuse idiopathic skeletal hyperostosis (DISH).     HRCT:   No groundglass changes to suggest air-trapping.  No bronchiectasis or bronchiolectasis.  No interstitial lung changes or architectural distortion.                 Impression   CONCLUSION:  1. HRCT demonstrates extensive bilateral multifocal airspace opacities, predominantly peribronchial in distribution.  Finding is thought to relate to underlying sarcoidosis.  Areas of multifocal more confluent opacity, including at the left lung base may   be secondary to the same process.  Superimposed pneumonia or developing chronic organizing pneumonia are differential considerations.  Attention on follow-up CT chest in 3 to 6 months.       2. Exam also demonstrates 8 millimeter nodule in the right lower lobe, thought to relate to underlying sarcoidosis, with other etiologies within the differential at this time..  Recommend attention on follow-up CT chest.     3. Small to moderate left pleural effusion.  Nonspecific , but can also be secondary to sarcoidosis.       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   - Prior history of sarcoidosis where she was treated with corticosteroids in the past  -+SSA, Centromere w/o other fx of Sjogren of Scleroderma but recent CT with extensive GGO and Upper lobe with fibrosis that can be seen in CTD (specifically, SS)   #Small to Moderate L Pleural Effusion  - found to have a small to moderate left pleural effusion during admisssion, pulmonology tried to do a thoracentesis but there was not enough fluid  #RLL 8 mm Nodule     Chronic:  # Asthma  # ELPIDIO on CPAP with poor compliance  # CHF  # HTN      PLAN:  -IV Solu-Medrol 60 mg every 6 hours (3/1-  present)       -s/p IV Solumedrol 40 mg x1 2/27, IV Solumedrol 60 mg q8h 2/27-3/1  - Myfortic 360 mg BID 3/2/24       -Pending patient tolerance, will consider increase to 750 mg BID 3/9  - After discussion with neurology, patient's family, and patient: rituximab at 1000 mg 2 doses 2 weeks apart with first dose 3/6 (next dose: 3/20).    -Pending transfer to an outside facility    Will continue to follow. In the process of trying to transfer to Pema Cloud DO  3/7/2024   8:33 AM

## 2024-03-09 PROBLEM — J96.00 ACUTE RESPIRATORY FAILURE (HCC): Status: ACTIVE | Noted: 2024-01-01

## 2024-03-09 NOTE — PROGRESS NOTES
Critical Care    Seen multiple times during day with Dr. Mckeon and independently.   As outlined by Dr. Mckeon earlier she does not want intubation.   Increasing oxygen needs with increased work of breathing.  Multiple family members including son Roger BARRON  at bedside - decision to proceed to comfort measures.   Hospice consulted.      Reviewed with Frank US.    Catherine Neri NP  Critical Care

## 2024-03-09 NOTE — PLAN OF CARE
Patient remains resting in bed overnight, family at bedside during shift.  came to bedside to visit with family and patient. Andrew pearson. Frequent rounds made, call light within reach.     Problem: Patient Centered Care  Goal: Patient preferences are identified and integrated in the patient's plan of care  Description: Interventions:  - What would you like us to know as we care for you? I live at home with my .  - Provide timely, complete, and accurate information to patient/family  - Incorporate patient and family knowledge, values, beliefs, and cultural backgrounds into the planning and delivery of care  - Encourage patient/family to participate in care and decision-making at the level they choose  - Honor patient and family perspectives and choices  Outcome: Progressing     Problem: Patient/Family Goals  Goal: Patient/Family Long Term Goal  Description: Patient's Long Term Goal: To go home    Interventions:  - Titrate oxygen down  - Medication compliance  - Follow provider recommendations  - See additional Care Plan goals for specific interventions  Outcome: Progressing  Goal: Patient/Family Short Term Goal  Description: Patient's Short Term Goal: To breathe easier    Interventions:   - Oxygen therapy  - IV solumedrol  - Nebulizer treatments  - Further testing  - Follow provider recommendations  - See additional Care Plan goals for specific interventions  Outcome: Progressing     Problem: CARDIOVASCULAR - ADULT  Goal: Maintains optimal cardiac output and hemodynamic stability  Description: INTERVENTIONS:  - Monitor vital signs, rhythm, and trends  - Monitor for bleeding, hypotension and signs of decreased cardiac output  - Evaluate effectiveness of vasoactive medications to optimize hemodynamic stability  - Monitor arterial and/or venous puncture sites for bleeding and/or hematoma  - Assess quality of pulses, skin color and temperature  - Assess for signs of decreased coronary artery perfusion -  ex. Angina  - Evaluate fluid balance, assess for edema, trend weights  Outcome: Progressing  Goal: Absence of cardiac arrhythmias or at baseline  Description: INTERVENTIONS:  - Continuous cardiac monitoring, monitor vital signs, obtain 12 lead EKG if indicated  - Evaluate effectiveness of antiarrhythmic and heart rate control medications as ordered  - Initiate emergency measures for life threatening arrhythmias  - Monitor electrolytes and administer replacement therapy as ordered  Outcome: Progressing     Problem: RESPIRATORY - ADULT  Goal: Achieves optimal ventilation and oxygenation  Description: INTERVENTIONS:  - Assess for changes in respiratory status  - Assess for changes in mentation and behavior  - Position to facilitate oxygenation and minimize respiratory effort  - Oxygen supplementation based on oxygen saturation or ABGs  - Provide Smoking Cessation handout, if applicable  - Encourage broncho-pulmonary hygiene including cough, deep breathe, Incentive Spirometry  - Assess the need for suctioning and perform as needed  - Assess and instruct to report SOB or any respiratory difficulty  - Respiratory Therapy support as indicated  - Manage/alleviate anxiety  - Monitor for signs/symptoms of CO2 retention  Outcome: Progressing     Problem: SKIN/TISSUE INTEGRITY - ADULT  Goal: Skin integrity remains intact  Description: INTERVENTIONS  - Assess and document risk factors for pressure ulcer development  - Assess and document skin integrity  - Monitor for areas of redness and/or skin breakdown  - Initiate interventions, skin care algorithm/standards of care as needed  Outcome: Progressing  Goal: Oral mucous membranes remain intact  Description: INTERVENTIONS  - Assess oral mucosa and hygiene practices  - Implement preventative oral hygiene regimen  - Implement oral medicated treatments as ordered  Outcome: Progressing

## 2024-03-09 NOTE — SPIRITUAL CARE NOTE
Spiritual Care Visit Note    Patient Name: Freya Martinez Date of Spiritual Care Visit: 24   : 1955 Primary Dx: <principal problem not specified>       Referred By: Referral From: Hospice    Spiritual Care Taxonomy:    Intended Effects: Demonstrate caring and concern    Methods: Offer emotional support;Offer spiritual/Bahai support    Interventions: Acknowledge current situation;Active listening;Ask guided questions;Ask guided questions about maninder;Identify supportive relationship(s);[place]    Visit Type/Summary:     - Spiritual Care: Responded to a request for spiritual care and assessed the patient for spiritual care needs. Consulted with RN prior to visit. Offered empathic listening and emotional support. Provided information regarding how to contact Spiritual Care and left a Spiritual Care information card. Provided support for Patient's spiritual/Bahai requests. Offered prayer.  remains available for follow up.    Spiritual Care support can be requested via an Epic consult. For urgent/immediate needs, please contact the On Call  at: [place]: ext [phone]    Rev. Robson Day MDiv

## 2024-03-09 NOTE — HOSPICE RN NOTE
[facility] met with pt and several family members at bedside. POCAITLIN is son Roger. RH discussed hospice benefit, goals of care, levels of hospice care, medications used to treat symptoms related to end of life, revocation, medicare coverage. All questions encouraged and answered. Care companion book reviewed, consents signed electronically.    Pt is 68 yr old female with hospice diagnosis of acute respiratory failure. Pt disgnosed with sarcoidosis in 1989. Worsening symptoms over last 5-6 months. This is second hospitalization in 2 weeks. Was initially treated with steroid taper but returned to ER 2/26 with complaints of SOB, chills and increased sputum production. Hospital course complicated by increased 02 needs and SOB. Was treated with IV steroids and nebs, IV Lasix.  Did not want intubation. Now on vapotherm/HF02 94%, 60L. Still hypoxic, was on 2L home 02. Already on morphine drip at 2mg/hr. Family wants to focus on comfort. Pt unresponsive. RR=16 though severely labored at rest. Recommending increase morphine drip to 3mg/hr and give 1mg IVP morphine now for dyspnea. If/when respiratory status improves, instructed to wean 02 as able.     PPS: 10    Pt inpatient hospice at this time for management of symptoms of dyspnea and pain with continuous and IV push medication. Pt requires frequent assessment by skilled nurse for medication administration/titration and for the observation of, and interventions for, all current and potential symptoms related to EOL. All questions encouraged and answered, will continue to monitor for comfort. [facility] to continue to offer services and provide support to patient and family as needed. POC discussed with Jim US.    Estrella Mims RN, BSN  [facility] Nurse Liaison  Office: [phone] or After Hours: [phone]     Jim.    Estrella Mims, RN, BSN  [facility] Nurse Liaison  Office: [phone] or After Hours: [phone]

## 2024-03-09 NOTE — SPIRITUAL CARE NOTE
Spiritual Care Visit Note    Patient Name: Freya Martinez Date of Spiritual Care Visit: 24   : 1955 Primary Dx: Acute on chronic respiratory failure with hypoxia (HCC)       Referred By: Referral From: Nurse;Family    Spiritual Care Taxonomy:    Intended Effects: Aligning care plan with patient's values    Methods: Explore spiritual/Jew beliefs    Interventions: [place];Acknowledge response to difficult experience    Visit Type/Summary:     - Spiritual Care: Responded to a request via the on call phone Offered empathic listening and emotional support. Provided support for Patient's spiritual/Jew requests. Offered prayer.  remains available for follow up.    Spiritual Care support can be requested via an [facility] consult. For urgent/immediate needs, please contact the On Call  at: [place]: ext [phone]    Chaplain Catrina.

## 2024-03-09 NOTE — PROGRESS NOTES
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  She has never been on DMARD/Biologics.    Progress Note    Freya Martinez Patient Status:  Inpatient    1955 MRN [id number]   Location [facility] 2W/SW Attending Pb Mendieta MD   Hosp Day # 12 PCP FILEMON STALEY MD     Subjective:     Patient with increased oxygen requirements, now on HFNC 60 and still desatting to the 80s  On evaluation this morning, patient sleeping at the bedside with family  On heparin drip      Objective:   Blood pressure 123/51, pulse 93, temperature 97.6 °F (36.4 °C), temperature source Temporal, resp. rate 17, weight 280 lb (127 kg), SpO2 (!) 83%, not currently breastfeeding.    GEN: Sleeping, not in acute distress, NAD  HEENT: EOMI, PERRLA, no injection or icterus, oral mucosa moist, no oral lesions. No lymphadenopathy. No facial rash  CVS: RRR, no murmurs rubs or gallops. Equal 2+ distal pulses.   LUNGS: on high flow oxygen  ABDOMEN:  soft NT/ND, +BS, no HSM  SKIN: No rashes or skin lesions. No nail findings  MSK:  No swelling or synovitis on exam  NEURO: Cranial nerves II-XII intact grossly. 5/5 strength throughout in both upper and lower extremities, sensation intact.  PSYCH: normal mood      Results:   Lab Results   Component Value Date    WBC 13.6 (H) 2024    HGB 12.4 2024    HCT 40.1 2024    .0 2024    CREATSERUM 1.76 (H) 2024    BUN 70 (H) 2024     2024    K 6.2 (HH) 2024     2024    CO2 29.0 2024     (H) 2024    CA 8.8 2024    ALB 3.7 2024    ALKPHO 139 2024    BILT 0.4 2024    TP 7.2 2024    AST 69 (H) 2024     (H) 2024    PTT 28.6 2024     TSH 1.790 05/12/2020    DDIMER 1.42 (H) 02/26/2024    ESRML >130 (H) 02/27/2024    CRP 4.30 (H) 02/13/2024    MG 2.6 03/06/2024    PHOS 4.0 03/06/2024    TROPHS 13 02/26/2024       XR CHEST AP PORTABLE  (CPT=71045)    Result Date: 3/8/2024  CONCLUSION:  1. Interval worsening in the chest with worsening consolidation in the right lung which appears more consolidated, and there is air lucency around the upper lateral and upper medial aspect of the right lung suspect for possible pneumothorax although poorly visualized.  Recommend CT scanning to further assess.  New moderate pneumomediastinum extending cephalad into the right and left lower neck right more than left as well as into the right subaxiallart region.  Worsening left mid and lower chest airspace consolidation suggesting worsening pneumonia versus asymmetric pulmonary edema pattern.  I cannot exclude a left pleural effusion.  Correlate clinically and follow-up studies are advised.  2. Results were called the nursing floor by technologist by Cande.    Dictated by (CST): Gerry Jo MD on 3/08/2024 at 12:49 PM     Finalized by (CST): Gerry Jo MD on 3/08/2024 at 12:56 PM               RELEVANT INPATIENT LABS:       2/28/2024:  Respiratory viral panel negative     RF 11 WNL  ADELAIDE by LASHAWN positive, ADELAIDE by IFA 1:320 anti-centromere,   C3 179.6 (high), C4 40.3 (high)  DsDNA 1.1 WNL  APS Panel +Lupus AC (patient not on AC), negative beta-2 glycoprotein, negative cardiolipin  CCP 1.4 WNL, RF 11 WNL     TB Testing negative  Acute Hepatitis Panel Negative     CBC with leukocytosis WBC 15.2 (neutrophilic predominance), normal Hg,   CMP with normal CR 0.96, nonelevated LFTs, no gamma gap noted     2/27/2004:  Sputum culture: No WBC, 1+ GPC  ESR greater than 130   (high)     2/26/24:  D-dimer 1.42 (high)  BNP 18 WNL  Troponin 13 negative x 1  Pro-Jonh less than 0.04  Imaging:     3/4/24 CXR:   Impression   CONCLUSION: Extensive bilateral  pulmonary opacities are similar or slightly increased compared to the prior exam.     2/27/2024 chest ultrasound:     FINDINGS:  There is a small left pleural effusion.  Thoracentesis was not performed by Dr. Mckeon since the amount of fluid was not that significant.               Impression   CONCLUSION:  1. Small left pleural effusion.  Thoracentesis was not performed by Dr. Mckeon.      2/26/24 CT Chest PE AORTA:       FINDINGS:  CARDIAC: The heart is within normal limits of size.  There are coronary artery calcifications (3:69).  No pericardial effusion.  MEDIASTINUM/CHRISTINE: No mass or enlarged adenopathy.  There is been slight interval increase in size of a few nonenlarged mediastinal lymph nodes with a right paratracheal lymph node measuring 6 mm (3:32), previously 4 mm.  There is a 7 mm right lower hilar   lymph node, previously 5 mm.  LUNGS: There is been mild interval increase in the dense consolidation involving the left lower lobe.  There is opacification of multiple segmental and subsegmental bronchi involving the left lower lobe.  There has been interval increase in the  multifocal ground-glass opacities throughout both lungs (3:71).  There is unchanged multifocal bronchiectasis, which is most notable in the bilateral upper lobes (3:39).  The previously demonstrated consolidations involving the bilateral upper lobes,  lingula, refer periphery of the right lower lobe, and right middle lobe are again seen.  No evidence of pulmonary edema.  The previously described pulmonary nodules not well seen on this study.  VASCULATURE: The main pulmonary artery measures 3.0 cm.  No acute pulmonary embolism through the segmental pulmonary arteries.  THORACIC AORTA: The ascending aorta is normal in caliber.  There is a 2 vessel aortic arch.  Mild atherosclerotic calcification of the aortic arch.  No dissection.  PLEURA: There is a moderate left pleural effusion (6:119), which has increased in size when compared to  02/13/2024.  No right pleural effusion.  No pneumothorax.  CHEST WALL: No axillary mass or enlarged adenopathy.    LIMITED ABDOMEN: Gallbladder is surgically absent.  BONES: No acute fracture.  No aggressive osseous lesion.  There are multilevel degenerative changes of the thoracic spine.  There is an 11 mm right glenoid subchondral cyst (3:17).  Mild right greater than left glenohumeral joint osteoarthrosis.  OTHER: Negative.                 Impression   CONCLUSION:     1. No acute pulmonary embolism through the segmental level.  2. Multiple new ground-glass opacities throughout both lungs, which may reflect multifocal pneumonia, alveolar sarcoidosis, or less likely other etiologies.  3. Mild interval increase in the left lower lobe consolidation, which is concerning for pneumonia.  There is again seen opacification of multiple left lower lobe segmental and subsegmental bronchi, which may reflect mucous plugging.  4. Enlarging moderate left pleural effusion.  5. Redemonstrated upper lobe predominant bronchiectasis with fibrosis and multifocal nodular consolidations involving both lungs, which were present on the prior CT chest dated 02/13/2024 and may be secondary to sarcoidosis, or other  infectious/inflammatory etiologies.  6. Coronary artery calcifications.  7. Lesser incidental findings described above.        2/26/24 CXR:  FINDINGS:  CARDIAC/VASC: Borderline cardiomegaly.  MEDIAST/CHRISTINE:   Atherosclerotic aorta with no visible aneurysm.    LUNGS/PLEURA: Extensive bilateral mixed alveolar and interstitial multifocal airspace opacification with bibasilar pleural reaction and peripheral pleural reaction left mid hemithorax that has progressed  BONES: Mild scoliosis with mild degenerative disc disease and spondylosis.    OTHER: Negative.                 Impression   CONCLUSION:  1. Borderline cardiomegaly .  Atherosclerotic aorta.  2. Extensive bilateral mixed alveolar and interstitial multifocal airspace disease  with slight progression.  3. Slight progression of bilateral pleural reaction worse on the left at the bases      2/13/24 HRCT:  FINDINGS:  CARDIAC: No enlargement, pericardial thickening.  There are moderate coronary artery calcifications.  MEDIASTINUM/CHRISTINE: No mass or enlarged adenopathy.    LUNGS/PLEURA: Central airways are patent.  There is consolidation with air bronchograms in the left lower lobe.  There is multifocal alveolar opacity, which is in a predominantly peribronchial distribution, throughout both lungs.  Some areas of opacity  are somewhat nodular in appearance.  For example, there is an 8 millimeter nodule in the right lower lobe laterally (series 4, image 60).     There is a small to moderate left pleural effusion     VASCULATURE: Main pulmonary artery is not enlarged.  There is a right sided aortic arch, without ascending thoracic aortic aneurysm.    THORACIC AORTA: Normal size for age.  No aneurysm.    CHEST WALL: No mass or enlarged adenopathy.    LIMITED ABDOMEN: Limited images of the upper abdomen are unremarkable.    BONES: There is multilevel degenerative disease of the spine.  OTHER: UnremarkableThere are flowing ventral osteophytes at multiple consecutive levels, compatible with diffuse idiopathic skeletal hyperostosis (DISH).     HRCT:   No groundglass changes to suggest air-trapping.  No bronchiectasis or bronchiolectasis.  No interstitial lung changes or architectural distortion.                 Impression   CONCLUSION:  1. HRCT demonstrates extensive bilateral multifocal airspace opacities, predominantly peribronchial in distribution.  Finding is thought to relate to underlying sarcoidosis.  Areas of multifocal more confluent opacity, including at the left lung base may   be secondary to the same process.  Superimposed pneumonia or developing chronic organizing pneumonia are differential considerations.  Attention on follow-up CT chest in 3 to 6 months.       2. Exam also demonstrates  8 millimeter nodule in the right lower lobe, thought to relate to underlying sarcoidosis, with other etiologies within the differential at this time..  Recommend attention on follow-up CT chest.     3. Small to moderate left pleural effusion.  Nonspecific , but can also be secondary to sarcoidosis.       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   - Prior history of sarcoidosis where she was treated with corticosteroids in the past  -+SSA, Centromere w/o other fx of Sjogren of Scleroderma but recent CT with extensive GGO and Upper lobe with fibrosis that can be seen in CTD (specifically, SS)   #Small to Moderate L Pleural Effusion  - found to have a small to moderate left pleural effusion during admisssion, pulmonology tried to do a thoracentesis but there was not enough fluid  #RLL 8 mm Nodule     Chronic:  # Asthma  # ELPIDIO on CPAP with poor compliance  # CHF  # HTN    IMPRESSION:   Patient with increasing oxygen requirements, changed to comfort measures yesterday evening.  Therefore, will hold Myfortic.  Okay to continue IV steroids, as there may be some element of anti-inflammatory pain control.    PLAN:  -IV Solu-Medrol 60 mg every 6 hours (3/1- present)       -s/p IV Solumedrol 40 mg x1 2/27, IV Solumedrol 60 mg q8h 2/27-3/1  -Hold Myfortic 360 mg BID given comfort measures focus (started 3/2/24)  -Status post rituximab at 1000 mg with first dose 3/6      Patient and patient family leaning toward comfort measures.  CODE STATUS changed to DNAR.    Digna Cloud DO  3/9/2024   9:33 AM

## 2024-03-09 NOTE — DISCHARGE SUMMARY
[facility]  part of [facility]    DISCHARGE SUMMARY     Freya Martinez Patient Status:  Inpatient    1955 MRN [id number]   Location [facility] 2W/SW Attending Jennifer Lundberg MD   Hosp Day # 12 PCP FILEMON STALEY MD     Date of Admission: 2024  Date of Discharge:  3/9/2024    Discharge Disposition: Home or Self Care    Discharge Diagnosis:     Acute on chronic respiratory failure 2/2 pulmonary sarcoidosis/ILD and COPD. Possible asthma exacerbation  ELPIDIO  Morbid obesity  HTN  5.    Hyperkalemia    History of Present Illness:     Freya Martinez is a 68 year old female with a past medical history of ILD/Sarcoidosis who was discharged two weeks ago after being admitted for SOB. He was discharged on a long taper of steroids.   The patient returned to the ER today with similar complaints. She endorsed sob since last night, chills and increased sputum production.   She denied any fever, n/v/d or any other complaints.    Brief Synopsis:     Acute on chronic respiratory failure 2/2 pulmonary sarcoidosis/ILD and COPD. Possible asthma exacerbation  CXR reviewed  CT chest negative for PE  BNP 18, Dimer 1.42, Procal negative  ?atypical infection -> expanded resp panel neg  Pulm and Rheum on consult   Cont Solumedrol 60 mg q6hrs, duonebs  US guided thoracentesis -> hold for now  Echo with preserved EF and no WMA  Hold diuresis at this time  Started on mycophenolate, rituximab    Strict Is and Os, daily weights  Cont to monitor in PCU on HFNC   Family focusing on comfort care at this time as they are aware of her poor prognosis  Patient being discharged to hospice care for further management  ELPIDIO  CPAP - unable to tolerate most nights  Continue protocol  Morbid obesity  BMI 42  Counseled on making healthy lifestyle and dietary changes  HTN  BP well controlled  Continue home hydrochlorothiazide  Lisinopril changed to amlodipine given hyperkalemia  Monitor vitals  5.    Hyperkalemia                 1.    Stop lisinopril                2.    Monitor labs    Patient is to remain compliant with all discharge medications and instructions and to follow up as advised.   Patient encouraged to make healthy lifestyle and dietary changes.    Lace+ Score: 82  59-90 High Risk  29-58 Medium Risk  0-28   Low Risk       TCM Follow-Up Recommendation:  LACE > 58: High Risk of readmission after discharge from the hospital.      Procedures during hospitalization:   None    Incidental or significant findings and recommendations (brief descriptions):  None    Lab/Test results pending at Discharge:   None    Consultants:  Pulm  Rheum    Discharge Medication List:     Discharge Medications        ASK your doctor about these medications        Instructions Prescription details   albuterol 108 (90 Base) MCG/ACT Aers  Commonly known as: Ventolin HFA      INHALE TWO PUFFS PO QID PRN   Refills: 0     diclofenac 1.3 % Ptch  Commonly known as: Flector      Apply 1 patch topically Q12H.   Refills: 0     fluticasone furoate-vilanterol 200-25 MCG/ACT Aepb  Commonly known as: Breo Ellipta      Inhale 1 puff into the lungs daily.   Quantity: 60 each  Refills: 0     furosemide 20 MG Tabs  Commonly known as: Lasix      Take 1 tablet (20 mg total) by mouth every other day.   Quantity: 10 tablet  Refills: 0     ipratropium-albuterol 0.5-2.5 (3) MG/3ML Soln  Commonly known as: Duoneb      Take 3 mL by nebulization every 4 (four) hours as needed (for SOB/wheezing).   Refills: 0     lisinopril 20 MG Tabs  Commonly known as: Prinivil; Zestril      Take 1 tablet (20 mg total) by mouth daily.   Refills: 0     montelukast 10 MG Tabs  Commonly known as: Singulair      Take 1 tablet (10 mg total) by mouth once as needed (allergies).   Refills: 0     pantoprazole 40 MG Tbec  Commonly known as: Protonix      Take 1 tablet (40 mg total) by mouth every morning before breakfast. As needed   Refills: 0     predniSONE 20 MG Tabs  Commonly known as:  Deltasone  Start taking on: February 15, 2024  Ask about: Which instructions should I use?      Take 3 tablets (60 mg total) by mouth daily with breakfast for 7 days, THEN 2.5 tablets (50 mg total) daily with breakfast for 7 days. THEN 2 tablets (40 mg total) daily for 7 days, THEN 1.5 tablets (30 mg total) for 7 days, THEN continue with 1 tablet of 20 mg until advised otherwise by your pulmonologist.   Quantity: 70 tablet  Refills: 0              [place]PMP reviewed: yes    Follow-up appointment:   No follow-up provider specified.  Appointments for Next 30 Days 3/9/2024 - 4/8/2024        Date Arrival Time Visit Type Length Department Provider     3/20/2024  4:15 PM  FOLLOW UP VISIT [2828] 15 min [facility], [facility], [place] Diann Mckeon MD    Patient Instructions:         Location Instructions:     Your appointment is located at [address] [facility] in [place], [place].&nbsp; Please park in the [address] lot, enter through the [facility] entrance, and go to [address].  Masks are optional for all patients and visitors, unless otherwise indicated.                      Vital signs:  Temp:  [97.6 °F (36.4 °C)-98.4 °F (36.9 °C)] 97.6 °F (36.4 °C)  Pulse:  [84-97] 93  Resp:  [17-49] 17  BP: (115-154)/() 123/51  SpO2:  [83 %-89 %] 83 %  FiO2 (%):  [90 %-95 %] 94 %    Physical Exam:    Gen: ill appearing  Chest: decreased breath sounds bilaterally  CVS: normal s1 and s2 RR  Abd: NABS soft NT ND  Neuro: unable to assess  Ext: no edema in bilateral LE    -----------------------------------------------------------------------------------------------  PATIENT DISCHARGE INSTRUCTIONS: See electronic chart    Jennifer Lundberg MD  Hospitalist    Time spent:  > 30 minutes    The 21st Century Cures Act makes medical notes like these available to patients in the interest of transparency. Please be advised this is a medical document. Medical documents are intended to carry relevant information,  facts as evident, and the clinical opinion of the practitioner. The medical note is intended as peer to peer communication and may appear blunt or direct. It is written in medical language and may contain abbreviations or verbiage that are unfamiliar.

## 2024-03-09 NOTE — CM/SW NOTE
CM received MDO for hospice. TIFF spoke with Susannah. [facility] liaison for referral.   [facility] team to see pt today.     Zenobia Ludwig RN, BSN  Nurse   [phone]

## 2024-03-09 NOTE — HOSPICE RN NOTE
[facility] new referral received. Spoke to family, 11:30am mtg scheduled for today. Please call with questions/changes.    Estrella Mims RN, BSN  [facility]  Transitional Nurse Liaison  [phone] or After hours: [phone]

## 2024-03-10 NOTE — TRANSFER CENTER NOTE
Pt. Is on hospice, family is at the bedside. Pt. Has morphine drip at 4 and stanley in place. Report given to RN on the 4th floor. Pt. Was transferred by this RN and transport. Pt. Went to room 451.

## 2024-03-10 NOTE — DISCHARGE SUMMARY
[facility]  Discharge Summary / Death Note     Freya Martinez  : 1955     Date of Admission:  3/9/2024  Date of Death:  3/09/2024     Death Diagnoses:   Acute on chronic respiratory failure 2/2 sarcoidosis/ILD      History of Present Illness:     Freya Martinez is a 68 year old female with a past medical history of asthma, HTN, sarcoidosis/ILD who was admitted with acute on chronic respiratory failure and was seen by pulm and rheumatology. She was on high dose steroids without any significant improvement. The patient unfortunately had worsening respiratory status and clinical decline and as such the family decided to move forward with hospice care.       Hospital Course:     Time of Death:      RN was called to patient’s bedside to pronounce patient. No spontaneous movements were present. There was not response to verbal or tactile stimuli. Pupils were mid-dilated and fixed. No breath sounds were appreciated over either lung field. No carotid pulses were palpable. No heart sounds were auscultator over entire precordium. Patient pronounced  at date & time indicated above. Family and attending notified by staff.      Jennifer Lundberg MD  3/10/2024  8:41 AM

## 2024-03-10 NOTE — HOSPICE RN NOTE
[facility] Nurse visit.  Pt is in bed unresponsive.  Respirations are 16 and labored w/accessory muscles in use.  Rhonchi scaattered throughout.  Floor nurseJim to give IVP MS PRN dose and to increase MS continuous GTT to 4mg/hr.  Multiple family members at bedside.  Emotional support via active listening/time for questions.  None voiced. Family in agreement to give PRN MS and increase GTT.  Pt remains GIP LOC appropriate for frequent O/A and treat for dyspnea/congestion.  And for EOL care.    Rebecca Gentile RN  [facility]   [phone]

## 2024-03-10 NOTE — PROGRESS NOTES
Pt  at [facility]. Resusitation not attempted as pt was DNR.    Time of Death     Family Notified - Yes, family at bedside.    Hina Chappell MD notified    [facility] Notified. Yes Edward Holloway [phone]     contacted if applicable N/A

## 2024-03-10 NOTE — HOSPICE RN NOTE
Hospice RN called Pt's son/ANDERSON Duran to give condolences and confirm correct  home information.  Per Roger they have changed FH they have called [facility] in [place].  Condolences given.  [facility] called confirmed they had correct info on Pt.    Rebecca Gentile RN   [facility]

## 2024-03-10 NOTE — H&P
[facility]  part of [facility]    History and Physical     Freya Martinez Patient Status:  Inpatient    1955 MRN [id number]   Location [facility] 4W/SW/SE Attending No att. providers found   Hosp Day # 1 PCP FILEMON STALEY MD     History of Present Illness: Freya Martinez is a 68 year old female with a past medical history of asthma, HTN, sarcoidosis/ILD who was admitted with acute on chronic respiratory failure and was seen by pulm and rheumatology. She was on high dose steroids without any significant improvement. The patient unfortunately had worsening respiratory status and clinical decline and as such the family decided to move forward with hospice care.     Past Medical History:  Past Medical History:   Diagnosis Date    Ascariosis     Asthma (HCC)     Hypertension     Sleep apnea         Past Surgical History: No past surgical history on file.    Social History:  reports that she has quit smoking. She has quit using smokeless tobacco. She reports that she does not currently use alcohol. She reports that she does not use drugs.    Family History: No family history on file.    Allergies: No Known Allergies    Medications:    Current Facility-Administered Medications on File Prior to Encounter   Medication Dose Route Frequency Provider Last Rate Last Admin    [COMPLETED] furosemide (Lasix) 10 mg/mL injection 20 mg  20 mg Intravenous Once Diann Mckeon MD   20 mg at 24 0758    [COMPLETED] LORazepam (Ativan) 2 mg/mL injection 0.5 mg  0.5 mg Intravenous Once Catherine Neri APRN   0.5 mg at 24 1452    [COMPLETED] riTUXimab-abbs (Truxima) 1,000 mg in sodium chloride 0.9% 600 mL infusion  1,000 mg Intravenous Once Tania Hoff  mL/hr at 24 2100 Rate Change at 24 2100    [COMPLETED] diphenhydrAMINE (Benadryl) cap/tab 25 mg  25 mg Oral Once Tania Hoff MD   25 mg at 24 1605    [COMPLETED] furosemide (Lasix) 10 mg/mL injection 20 mg  20 mg  Intravenous Once Diann Mckeon MD   20 mg at 24 0954    [COMPLETED] furosemide (Lasix) 10 mg/mL injection 20 mg  20 mg Intravenous Once Diann Mckeon MD   20 mg at 24 0959    [] ipratropium-albuterol (Duoneb) 0.5-2.5 (3) MG/3ML inhalation solution             [COMPLETED] methylPREDNISolone sodium succinate (Solu-MEDROL) injection 125 mg  125 mg Intravenous Once Radha Ferguson MD   125 mg at 24 0724    [COMPLETED] ipratropium-albuterol (Duoneb) 0.5-2.5 (3) MG/3ML inhalation solution 3 mL  3 mL Nebulization Once Scott Sanches MD   3 mL at 24 1020    [COMPLETED] iopamidol 76% (ISOVUE-370) injection for power injector  80 mL Intravenous ONCE PRN Scott Sanches MD   80 mL at 24 1049    [COMPLETED] albuterol (Ventolin) (5 MG/ML) 0.5% nebulizer solution 10 mg  10 mg Nebulization Once Gómez Aiken MD   10 mg at 24    [COMPLETED] methylPREDNISolone sodium succinate (Solu-MEDROL) injection 125 mg  125 mg Intravenous Once Gómez Aiken MD   125 mg at 24    [COMPLETED] doxycycline (Vibramycin) cap 100 mg  100 mg Oral Once Gómez Aiken MD   100 mg at 24    [COMPLETED] cefTRIAXone (Rocephin) 2 g in D5W 100 mL IVPB-ADD  2 g Intravenous Once Gómez Aiken  mL/hr at 24 2 g at 24     Current Outpatient Medications on File Prior to Encounter   Medication Sig Dispense Refill    furosemide (LASIX) 20 MG Oral Tab Take 1 tablet (20 mg total) by mouth every other day. 10 tablet 0    fluticasone furoate-vilanterol 200-25 MCG/ACT Inhalation Aerosol Powder, Breath Activated Inhale 1 puff into the lungs daily. 60 each 0    diclofenac 1.3 % External Patch Apply 1 patch topically Q12H.      predniSONE 20 MG Oral Tab Take 3 tablets (60 mg total) by mouth daily with breakfast for 7 days, THEN 2.5 tablets (50 mg total) daily with breakfast for 7 days. THEN 2 tablets (40 mg total) daily for 7 days, THEN 1.5  tablets (30 mg total) for 7 days, THEN continue with 1 tablet of 20 mg until advised otherwise by your pulmonologist. 70 tablet 0    pantoprazole 40 MG Oral Tab EC Take 1 tablet (40 mg total) by mouth every morning before breakfast. As needed      montelukast 10 MG Oral Tab Take 1 tablet (10 mg total) by mouth once as needed (allergies).      ipratropium-albuterol 0.5-2.5 (3) MG/3ML Inhalation Solution Take 3 mL by nebulization every 4 (four) hours as needed (for SOB/wheezing).      Albuterol Sulfate  (90 Base) MCG/ACT Inhalation Aero Soln INHALE TWO PUFFS PO QID PRN      lisinopril 20 MG Oral Tab Take 1 tablet (20 mg total) by mouth daily.         Review of Systems:   A comprehensive 14 point review of systems was completed.    Pertinent positives and negatives noted in the HPI.    Physical Exam:    BP (!) 89/40 (BP Location: Right arm)   Pulse 94   Resp 21   LMP  (LMP Unknown)   SpO2 (!) 76%   General: ill appearing  Respiratory: Clear to auscultation bilaterally. No wheezes. No rhonchi.  Cardiovascular: S1, S2. Regular rate and rhythm. Decreased breath sounds  Abdomen: Soft, nontender, nondistended.  Positive bowel sounds. No rebound, guarding or organomegaly.  Neurologic: unable to assess  Extremities: No edema or cyanosis.      Diagnostic Data:      Labs:  Recent Labs   Lab 03/05/24  0422 03/06/24  0805 03/07/24  0351 03/08/24 0425 03/09/24  0335   WBC 11.2* 9.9 13.8* 15.1* 13.6*   HGB 12.2 12.1 12.3 12.6 12.4   MCV 78.8* 83.6 80.3 80.9 82.0   .0 252.0 262.0 235.0 235.0       Recent Labs   Lab 03/07/24  0351 03/08/24  0425 03/09/24  0335   * 138* 115*   BUN 48* 43* 70*   CREATSERUM 1.22* 0.96 1.76*   CA 8.8 8.8 8.8   ALB 3.7 3.6 3.7    139 139   K 5.0 5.4* 6.2*    109 109   CO2 23.0 26.0 29.0   ALKPHO 132 154* 139   AST 37* 61* 69*   ALT 74* 120* 172*   BILT 0.2 0.3 0.4   TP 7.4 7.4 7.2       CrCl cannot be calculated (Unknown ideal weight.).    No results for input(s):  \"PTP\", \"INR\" in the last 168 hours.    No results for input(s): \"TROP\", \"CK\" in the last 168 hours.    Imaging: Imaging data reviewed in Epic.      ASSESSMENT / PLAN:     Comfort care  Management per hospice team  Acute on chronic respiratory failure 2/2 pulm sarcoidosis/ILD  ELPIDIO  Morbid obesity  HTN  HyperK          Jennifer Lundberg MD  3/10/2024                  The 21st Century Cures Act makes medical notes like these available to patients in the interest of transparency. Please be advised this is a medical document. Medical documents are intended to carry relevant information, facts as evident, and the clinical opinion of the practitioner. The medical note is intended as peer to peer communication and may appear blunt or direct. It is written in medical language and may contain abbreviations or verbiage that are unfamiliar.

## 2024-03-12 LAB — UR GALACT AG SCR: NEGATIVE

## 2024-03-18 ENCOUNTER — TELEPHONE (OUTPATIENT)
Dept: INTERNAL MEDICINE UNIT | Facility: HOSPITAL | Age: 69
End: 2024-03-18

## 2024-03-18 NOTE — TELEPHONE ENCOUNTER
Tommy called stating son was requesting name of MD to sign death certificate. Patient  under Inpatient Hospice level of care. Death certificate to be completed by [facility] (Dr Jae Santos, Phone [phone], fax [phone]).
